# Patient Record
Sex: MALE | Race: OTHER | HISPANIC OR LATINO | Employment: FULL TIME | URBAN - METROPOLITAN AREA
[De-identification: names, ages, dates, MRNs, and addresses within clinical notes are randomized per-mention and may not be internally consistent; named-entity substitution may affect disease eponyms.]

---

## 2021-05-05 ENCOUNTER — TELEPHONE (OUTPATIENT)
Dept: SURGERY | Facility: CLINIC | Age: 41
End: 2021-05-05

## 2021-05-05 NOTE — TELEPHONE ENCOUNTER
PN left VM to PT to call the clinic to link in to care with [facility] AT [facility]

## 2021-05-06 ENCOUNTER — PATIENT OUTREACH (OUTPATIENT)
Dept: SURGERY | Facility: CLINIC | Age: 41
End: 2021-05-06

## 2021-05-06 NOTE — PROGRESS NOTES
CM called ct to schedule intake and make Ct aware of necessary documents that need to be brought with him   CT is scheduled for tomorrow Friday 5/7/2021 at 10am

## 2021-05-07 ENCOUNTER — PATIENT OUTREACH (OUTPATIENT)
Dept: SURGERY | Facility: CLINIC | Age: 41
End: 2021-05-07

## 2021-05-07 NOTE — PROGRESS NOTES
Ct came in for scheduled intake with CM  Ct provided all necessary supporting documents to complete intake process  Ct is not working and does not have a stable income  He does live in stable housing and is not experiencing any domestic violence or abuse  Ct has not been adherent to medications as he has not been on medications for the last 4 years  Ct was receiving services at [facility] for a time but has not returned  Ct did sign an NICHOLE so that CM could receive information from his former   CM completed [employer] application for medicaid and food stamps  CM completed SPBP application but patient navigator had already submitted SPBP  CM reminded Ct that any paperwork he receives in the mail should be brought to CM to go over  CT was given an appt for Monday as well to meet with Janice Shields the nurse to go over things with him like the program and his expectations

## 2021-05-10 ENCOUNTER — PATIENT OUTREACH (OUTPATIENT)
Dept: SURGERY | Facility: CLINIC | Age: 41
End: 2021-05-10

## 2021-05-10 DIAGNOSIS — Z13.1 DIABETES MELLITUS SCREENING: ICD-10-CM

## 2021-05-10 DIAGNOSIS — Z11.3 ROUTINE SCREENING FOR STI (SEXUALLY TRANSMITTED INFECTION): ICD-10-CM

## 2021-05-10 DIAGNOSIS — Z13.6 ENCOUNTER FOR LIPID SCREENING FOR CARDIOVASCULAR DISEASE: ICD-10-CM

## 2021-05-10 DIAGNOSIS — Z13.220 ENCOUNTER FOR LIPID SCREENING FOR CARDIOVASCULAR DISEASE: ICD-10-CM

## 2021-05-10 DIAGNOSIS — B20 HIV DISEASE (HCC): Primary | ICD-10-CM

## 2021-05-10 DIAGNOSIS — Z20.2 CONTACT WITH AND (SUSPECTED) EXPOSURE TO INFECTIONS WITH A PREDOMINANTLY SEXUAL MODE OF TRANSMISSION: ICD-10-CM

## 2021-05-10 NOTE — PROGRESS NOTES
CT met with CM this morning and brought in paperwork from [employer]  CT needs to appeal his claim and gather necessary information that is required  CM suggested to Ct that he contact the phone numbers listed so that he can make an appt with [employer] and go over his case  CM told CT that she is not able to help him apply as she does not have any of his medical information  CT needs to make contact with [employer]  Ct shared that [employer] contacted him about his foodstamps and medical assistance  CT states he will be receiving something in the mail and will bring it to CM as soon as it comes

## 2021-05-11 ENCOUNTER — PATIENT OUTREACH (OUTPATIENT)
Dept: SURGERY | Facility: CLINIC | Age: 41
End: 2021-05-11

## 2021-05-11 NOTE — PROGRESS NOTES
DEVIN and Bindu discussed CT  Bindu stated that his SPBP was approved but then canceled because CT is approved for MA  Ct will have to wait until his MA is active in order to get blood work done and medications

## 2021-05-12 ENCOUNTER — PATIENT OUTREACH (OUTPATIENT)
Dept: SURGERY | Facility: CLINIC | Age: 41
End: 2021-05-12

## 2021-05-12 NOTE — PROGRESS NOTES
CM and nurse Mari Lorenzo discussed Ct  NICHOLE for [address] [place] [address] will be done so that clinical has information about Ct when he was incarcerated  CT has an appt with Mallory Hurst coming up so NICHOLE will be signed then  Mari Lorenzo was told that CT labs will get done when his insurance becomes effective which will probably be the beginning of June  CM is waiting for CT to provide paperwork from [employer] when it comes in

## 2021-05-20 ENCOUNTER — PATIENT OUTREACH (OUTPATIENT)
Dept: SURGERY | Facility: CLINIC | Age: 41
End: 2021-05-20

## 2021-05-20 ENCOUNTER — OFFICE VISIT (OUTPATIENT)
Dept: SURGERY | Facility: CLINIC | Age: 41
End: 2021-05-20
Payer: COMMERCIAL

## 2021-05-20 VITALS
DIASTOLIC BLOOD PRESSURE: 59 MMHG | HEIGHT: 75 IN | OXYGEN SATURATION: 97 % | TEMPERATURE: 96.2 F | RESPIRATION RATE: 18 BRPM | BODY MASS INDEX: 22.41 KG/M2 | HEART RATE: 72 BPM | WEIGHT: 180.2 LBS | SYSTOLIC BLOOD PRESSURE: 118 MMHG

## 2021-05-20 DIAGNOSIS — B20 SYMPTOMATIC HIV INFECTION (HCC): Primary | ICD-10-CM

## 2021-05-20 DIAGNOSIS — F32.A DEPRESSION, UNSPECIFIED DEPRESSION TYPE: ICD-10-CM

## 2021-05-20 DIAGNOSIS — T65.222A: ICD-10-CM

## 2021-05-20 DIAGNOSIS — F79 MENTAL IMPAIRMENT: ICD-10-CM

## 2021-05-20 DIAGNOSIS — R06.2 WHEEZING WITHOUT DIAGNOSIS OF ASTHMA: ICD-10-CM

## 2021-05-20 DIAGNOSIS — F41.9 ANXIETY: ICD-10-CM

## 2021-05-20 DIAGNOSIS — Z00.00 ENCOUNTER FOR MEDICAL EXAMINATION TO ESTABLISH CARE: ICD-10-CM

## 2021-05-20 PROBLEM — T65.221A: Status: ACTIVE | Noted: 2021-05-20

## 2021-05-20 PROCEDURE — 99205 OFFICE O/P NEW HI 60 MIN: CPT | Performed by: NURSE PRACTITIONER

## 2021-05-20 RX ORDER — NICOTINE 21 MG/24HR
1 PATCH, TRANSDERMAL 24 HOURS TRANSDERMAL EVERY 24 HOURS
Qty: 28 PATCH | Refills: 0 | Status: SHIPPED | OUTPATIENT
Start: 2021-05-20 | End: 2021-07-20

## 2021-05-20 RX ORDER — ALBUTEROL SULFATE 90 UG/1
2 AEROSOL, METERED RESPIRATORY (INHALATION) EVERY 6 HOURS PRN
Qty: 18 G | Refills: 2 | Status: SHIPPED | OUTPATIENT
Start: 2021-05-20 | End: 2021-12-09 | Stop reason: SDUPTHER

## 2021-05-20 NOTE — ASSESSMENT & PLAN NOTE
Pt reports he started smoking at the age of 26 with a 2 to 3 ppd hx for 14 years  Pt decreased smoking to about 10 cigarettes per day when he was incarcerated about 6 years ago  Pt also used chew while in penitentiary for unknown period of time  Discussed the increase risk for those with HIV who smoke is 7X higher than those who do not have HIV  Pt wishes to quit smoking and inquired about nicotine patches and gum for smoking cessation    · Stressed the importance of complete smoking cessation  · Educated pt on how to apply nicotine patches to body and how to removed from  body every 24 hours and to chew gum into a thin piece and place in between gum and cheek for no more than 10 minutes when he would smoke a cigarette or have craving for cigarette  · Nicotine patches 14 mg/ 24 hours   · Nicotine gum 4 mg  · Follow with [facility]

## 2021-05-20 NOTE — PROGRESS NOTES
CT came in for scheduled appt with clinic  He brought mail that needed to be looked at  CM enrolled Ct into healthplan with [employer] which will be effective 6/1/2021  CM contacted SPBP and Ct is not active as his medicaid was approved on May 7, 2021  CT will go for labs 6/1/2021  Ct will also get paperwork filled out by last employer so that CM can submit to [employer] without any interruption to his case

## 2021-05-20 NOTE — ASSESSMENT & PLAN NOTE
Doing good  Pt has been non adherent to HAART for at least 4 years  Last labs are from 2019: VL:  40 copies and CD4: 190  Stressed the importance of 100% medication adherence  Pt reports previous use of HAART: 2006: Kaletra, Truvada, Emtriva and 2019: [place] and Tivicay and Biktarvy  · Complete labs once insurance has been approved  · Stressed the importance of taking HAART at the same time every day and setting alarm on phone  · Complete 4 weeks after starting HAART and follow up with ID in 6 weeks  · Labs must be completed first prior to starting on HAART  · Plan to start pt on Biktarvy  · Awaiting rest of his health records  · OI prophylaxis dependent up CD4 count  · Stressed the importance of calling the [address] office if not feeling well  · Discussed U=U  · Pt is not sexually active at this time  · Discussed the importance of not taking any OTC vitamin supplements within 6 hours of HAART  · Discussed OI risks after starting HAART  · No HARRT was prescribed at this visit until pt has insurance and obtains labs on 2021    HIV Counselin2019 labs from [place] by Dr Cat Jones    Viral Load: < 40 copies     CD4 Count: 190    ART: [id number]     Denies side effects  Stressed the importance of adherence  Continue follow up in the ID clinic with Dr Khushbu Bonilla  Reviewed the most recent labs, including the Cd4 and viral load  Discussed the risks and benefits of treatment options, instructions for management, importance of treatment adherence, and reduction of risk factors  Educated on possible medication side effects  Counseled on routes of HIV transmission, including the risk of  infection  Emphasized that viral suppression is the best method to prevent HIV transmission  At this time the pt denies the need for HIV testing of anyone in their life  Total encounter time was greater than 35 minutes  Greater than 20 minutes were spent on counseling and patient education    Pt voices understanding and agreement with treatment plan

## 2021-05-20 NOTE — ASSESSMENT & PLAN NOTE
Ongoing  Pt admits to feeling down and due to life circumstances  Smoking marijuana for depression/anxiety    Denies any SA, or SI    · Follow with [facility]  · Continue to monitor

## 2021-05-20 NOTE — ASSESSMENT & PLAN NOTE
Ongoing  Pt admits to feeling down and due to life circumstances  Smoking marijuana for depression/anxiety  No signs of anxiety during visit     · Follow with [facility]  · Continue to monitor

## 2021-05-20 NOTE — PROGRESS NOTES
Assessment/Plan:    Symptomatic HIV infection ([address] )  Doing good  Pt has been non adherent to HAART for at least 4 years  Last labs are from 2019: VL:  40 copies and CD4: 190  Stressed the importance of 100% medication adherence  Pt reports previous use of HAART: 2006: Kaletra, Truvada, Emtriva and 2019: Barnetta Distad and Tivicay and Biktarvy  · Complete labs once insurance has been approved  · Stressed the importance of taking HAART at the same time every day and setting alarm on phone  · Complete 4 weeks after starting HAART and follow up with ID in 6 weeks  · Labs must be completed first prior to starting on HAART  · Plan to start pt on Biktarvy  · Awaiting rest of his health records  · OI prophylaxis dependent up CD4 count  · Stressed the importance of calling the [address] [place] [address] office if not feeling well  · Discussed U=U  · Pt is not sexually active at this time  · Discussed the importance of not taking any OTC vitamin supplements within 6 hours of HAART  · Discussed OI risks after starting HAART  · No HARRT was prescribed at this visit until pt has insurance and obtains labs on 2021    HIV Counselin2019 labs from [facility] by Dr Ashley Kumari    Viral Load: < 40 copies     CD4 Count: 190    ART: [id number]     Denies side effects  Stressed the importance of adherence  Continue follow up in the ID clinic with Dr Yoel Caba  Reviewed the most recent labs, including the Cd4 and viral load  Discussed the risks and benefits of treatment options, instructions for management, importance of treatment adherence, and reduction of risk factors  Educated on possible medication side effects  Counseled on routes of HIV transmission, including the risk of  infection  Emphasized that viral suppression is the best method to prevent HIV transmission  At this time the pt denies the need for HIV testing of anyone in their life  Total encounter time was greater than 35 minutes    Greater than 20 minutes were spent on counseling and patient education  Pt voices understanding and agreement with treatment plan  Toxic effect of secondhand tobacco smoke  Pt reports he started smoking at the age of 26 with a 2 to 3 ppd hx for 14 years  Pt decreased smoking to about 10 cigarettes per day when he was incarcerated about 6 years ago  Pt also used chew while in [place]way for unknown period of time  Discussed the increase risk for those with HIV who smoke is 7X higher than those who do not have HIV  Pt wishes to quit smoking and inquired about nicotine patches and gum for smoking cessation  · Stressed the importance of complete smoking cessation  · Educated pt on how to apply nicotine patches to body and how to removed from  body every 24 hours and to chew gum into a thin piece and place in between gum and cheek for no more than 10 minutes when he would smoke a cigarette or have craving for cigarette  · Nicotine patches 14 mg/ 24 hours   · Nicotine gum 4 mg  · Follow with [facility]    Wheezing without diagnosis of asthma  New dx  Pt denies any sob except occasionally when he is exercising  Pt denies pulmonary history: asthma, or COPD, or any infections  No fever, night sweats, chills, or cough  Inspiratory wheezing anterior/posterior  Current smoker  Concern for OI when pt starts HAART  DDx: asthma, allergies, OI, TB, or CAP    · CXR when pt has insurance June 1, 2021  · Albuterol inhaler 2 puffs every 6 hours as need for wheezing            Depression  Ongoing  Pt admits to feeling down and due to life circumstances  Smoking marijuana for depression/anxiety  Denies any SA, or SI    · Follow with [facility]  · Continue to monitor     Anxiety  Ongoing  Pt admits to feeling down and due to life circumstances  Smoking marijuana for depression/anxiety  No signs of anxiety during visit  · Follow with [facility]  · Continue to monitor     Mental impairment  Noted in his medical history    No obvious signs of mental impairment at today's visit  Previously seen by VIJAY LINDSAY and CM  · Continue to monitor       Diagnoses and all orders for this visit:    Symptomatic HIV infection ([facility] )  -     XR chest pa & lateral; Future    Encounter for medical examination to establish care    Mental impairment    Toxic effect of secondhand tobacco smoke, intentional self-harm, initial encounter ([place] [address] )  -     nicotine (NICODERM CQ) 14 mg/24hr TD 24 hr patch; Place 1 patch on the skin every 24 hours  -     nicotine polacrilex (NICORETTE) 4 mg gum; Chew 1 each (4 mg total) as needed for smoking cessation    Depression, unspecified depression type    Anxiety    Wheezing without diagnosis of asthma  -     XR chest pa & lateral; Future      No results found for: NA, K, CL, CO2, ANIONGAP, BUN, CREATININE, GLUCOSE, GLUF, CALCIUM, CORRECTEDCA, AST, ALT, ALKPHOS, PROT, BILITOT, EGFR  No results found for: WBC, HGB, HCT, MCV, PLT       Patient Active Problem List   Diagnosis    Symptomatic HIV infection ([place] [address] )    Toxic effect of secondhand tobacco smoke    Wheezing without diagnosis of asthma    Depression    Anxiety    Mental impairment     Subjective:      Patient ID: Renuka Benavides is a 45 y o  male  HPI  Pt is being seen in the office today to establish care with PCP and for management of chronic health care conditions  PMH: HIV, mental retardation, and vitamin D deficiency  Pt was dx with HIV in 1990's through sexual contact  Confirmatory February 8, 2006; most current in our records  Pt believes his transmission was received through a dirty needle that he stepped on from a known pt that was HIV positive  Pt was approved for [place] but will not be effective until June 1, 2021  Pt has been receiving services at [facility] at one time but has not been on any HAART for the past 4 years  Pt was released from prison 4 years ago after being incarcerated for several years    Pt was insistant that last known [facility] intake was 4 years even though there are records from [address] indicating pt was last seen for labs and noted to be taking Descovy and Tivicay from 2/2019 with a VL < 40 copies  Pt does not know what HAART he was taking and it thought to be taking Biktarvy and Descovy and Tivicay while incarcerated but records from [address] show [address] from 2/2019  Pt started on Kaletra in 2006, Truvada and Emtriva at some time, and transitioned to [address] with [facility] 2019  Pt is not working and is in the process of appealing SSI denial but reports having stable housing  Pt reports he is having loose stool after he eats due to being off of his HIV treatment  Pt admits to feeling depressed and anxious at time due to HIV status and not being treated  Pt denies wanting to injury himself and was taking medication for depression many years ago but does not know what  Pt denies being sexually active  Pt is looking for a job  Pt does smoke about 10 cigarettes per day but did start smoking at the age of 26 and smoked 2 to 3 packs per day for about 14 years  Pt denies ETOH and does smoke marijuana daily for depression and anxiety  Pt denies any fevers, chills, night sweats, nausea, vomiting, or diarrhea  Pt does have some sob when he exercises but not always  Pt is Dominican speaking only and interpretor was present during the entire visit  I have spent 60 minutes with Patient  today in which greater than 50% of this time was spent in counseling/coordination of care regarding Prognosis, Risks and benefits of tx options, Intructions for management, Patient and family education, Importance of tx compliance, Risk factor reductions and Impressions        The following portions of the patient's history were reviewed and updated as appropriate: allergies, current medications, past family history, past medical history, past social history, past surgical history and problem list     Review of Systems Constitutional: Negative  HENT: Negative  Respiratory: Negative  Cardiovascular: Negative  Gastrointestinal: Negative  Genitourinary: Negative  Musculoskeletal: Negative  Neurological: Negative  Psychiatric/Behavioral: Negative  Objective:      /59 (BP Location: Right arm, Patient Position: Sitting, Cuff Size: Standard)   Pulse 72   Temp (!) 96 2 °F (35 7 °C)   Resp 18   Ht 6' 3" (1 905 m)   Wt 81 7 kg (180 lb 3 2 oz)   SpO2 97%   BMI 22 52 kg/m²          Physical Exam  Vitals signs and nursing note reviewed  Exam conducted with a chaperone present  Constitutional:       General: He is not in acute distress  Appearance: Normal appearance  He is obese  He is not ill-appearing  HENT:      Head: Normocephalic  Right Ear: Tympanic membrane normal       Left Ear: Tympanic membrane normal       Nose: Nose normal       Mouth/Throat:      Mouth: Mucous membranes are moist    Eyes:      Extraocular Movements: Extraocular movements intact  Pupils: Pupils are equal, round, and reactive to light  Cardiovascular:      Rate and Rhythm: Normal rate and regular rhythm  Chest Wall: PMI is not displaced  Pulses: Normal pulses  Heart sounds: Normal heart sounds  Pulmonary:      Effort: Pulmonary effort is normal  No accessory muscle usage  Breath sounds: Examination of the right-upper field reveals wheezing  Examination of the left-upper field reveals wheezing  Examination of the right-middle field reveals wheezing  Examination of the left-middle field reveals wheezing  Examination of the right-lower field reveals wheezing  Examination of the left-lower field reveals wheezing  Wheezing present  Comments: Inspiratory wheezes anterior and posterior  Abdominal:      General: Bowel sounds are normal       Palpations: Abdomen is soft  Musculoskeletal: Normal range of motion  Lymphadenopathy:      Cervical: No cervical adenopathy     Skin: General: Skin is warm and dry  Capillary Refill: Capillary refill takes less than 2 seconds  Neurological:      Mental Status: He is alert and oriented to person, place, and time  Psychiatric:         Mood and Affect: Mood normal          Behavior: Behavior normal          Thought Content:  Thought content normal          Judgment: Judgment normal

## 2021-05-20 NOTE — ASSESSMENT & PLAN NOTE
New dx  Pt denies any sob except occasionally when he is exercising  Pt denies pulmonary history: asthma, or COPD, or any infections  No fever, night sweats, chills, or cough  Inspiratory wheezing anterior/posterior  Current smoker  Concern for OI when pt starts HAART    DDx: asthma, allergies, OI, TB, or CAP    · CXR when pt has insurance June 1, 2021  · Albuterol inhaler 2 puffs every 6 hours as need for wheezing

## 2021-05-20 NOTE — ASSESSMENT & PLAN NOTE
Noted in his medical history  No obvious signs of mental impairment at today's visit  Previously seen by VIJAY LINDSAY and DEVIN      · Continue to monitor

## 2021-06-04 ENCOUNTER — PATIENT OUTREACH (OUTPATIENT)
Dept: SURGERY | Facility: CLINIC | Age: 41
End: 2021-06-04

## 2021-06-07 ENCOUNTER — TELEPHONE (OUTPATIENT)
Dept: SURGERY | Facility: CLINIC | Age: 41
End: 2021-06-07

## 2021-06-07 NOTE — TELEPHONE ENCOUNTER
Patient left message 06/07 @ 7:51AM requesting to know when next appointment was scheduled  Spoke with patient and reminded of 06/21 appointment with PCP  Patient expressed understanding

## 2021-06-08 ENCOUNTER — PATIENT OUTREACH (OUTPATIENT)
Dept: SURGERY | Facility: CLINIC | Age: 41
End: 2021-06-08

## 2021-06-08 NOTE — PROGRESS NOTES
CM received in basket from [place] [id number] stating that ct has appt with Iggy on June 21, 2021 and has not done labs  CM called and left message for CT to return call

## 2021-06-09 ENCOUNTER — TELEPHONE (OUTPATIENT)
Dept: SURGERY | Facility: CLINIC | Age: 41
End: 2021-06-09

## 2021-06-09 ENCOUNTER — PATIENT OUTREACH (OUTPATIENT)
Dept: SURGERY | Facility: CLINIC | Age: 41
End: 2021-06-09

## 2021-06-09 NOTE — TELEPHONE ENCOUNTER
Patient left message 06/09 @ 7:50AM          Returned phone call and spoke with patient regarding PCP appointment on 06/21 and completing lab work prior to appointment   Patient expressed understanding and requested to have lab work printed and he will  today before 3PM

## 2021-06-10 ENCOUNTER — PATIENT OUTREACH (OUTPATIENT)
Dept: SURGERY | Facility: CLINIC | Age: 41
End: 2021-06-10

## 2021-06-10 NOTE — PROGRESS NOTES
[place] and CM discussed Ct  Shahida Bernard shared that Ct appt was changed and Ct is aware  Ct was also reminded to complete lab work

## 2021-06-14 ENCOUNTER — PATIENT OUTREACH (OUTPATIENT)
Dept: SURGERY | Facility: CLINIC | Age: 41
End: 2021-06-14

## 2021-06-14 NOTE — PROGRESS NOTES
Ct called in worried about getting his bloodwork done because he admitted in intake that he smokes marijuana  CM explained to Ct that clinic is looking for certain things in the bloodwork and marijuana is not one of them  CM encouraged ct to have labs done tomorrow in time for next weeks appt

## 2021-06-15 ENCOUNTER — APPOINTMENT (OUTPATIENT)
Dept: LAB | Facility: CLINIC | Age: 41
End: 2021-06-15
Payer: COMMERCIAL

## 2021-06-15 ENCOUNTER — PATIENT OUTREACH (OUTPATIENT)
Dept: SURGERY | Facility: CLINIC | Age: 41
End: 2021-06-15

## 2021-06-15 ENCOUNTER — TELEPHONE (OUTPATIENT)
Dept: SURGERY | Facility: CLINIC | Age: 41
End: 2021-06-15

## 2021-06-15 DIAGNOSIS — Z20.2 CONTACT WITH AND (SUSPECTED) EXPOSURE TO INFECTIONS WITH A PREDOMINANTLY SEXUAL MODE OF TRANSMISSION: ICD-10-CM

## 2021-06-15 DIAGNOSIS — Z13.1 DIABETES MELLITUS SCREENING: ICD-10-CM

## 2021-06-15 DIAGNOSIS — Z13.6 ENCOUNTER FOR LIPID SCREENING FOR CARDIOVASCULAR DISEASE: ICD-10-CM

## 2021-06-15 DIAGNOSIS — B20 HIV DISEASE (HCC): ICD-10-CM

## 2021-06-15 DIAGNOSIS — Z11.3 ROUTINE SCREENING FOR STI (SEXUALLY TRANSMITTED INFECTION): ICD-10-CM

## 2021-06-15 DIAGNOSIS — Z13.220 ENCOUNTER FOR LIPID SCREENING FOR CARDIOVASCULAR DISEASE: ICD-10-CM

## 2021-06-15 LAB
ALBUMIN SERPL BCP-MCNC: 3.5 G/DL (ref 3.5–5)
ALP SERPL-CCNC: 122 U/L (ref 46–116)
ALT SERPL W P-5'-P-CCNC: 20 U/L (ref 12–78)
ANION GAP SERPL CALCULATED.3IONS-SCNC: 2 MMOL/L (ref 4–13)
AST SERPL W P-5'-P-CCNC: 22 U/L (ref 5–45)
BACTERIA UR QL AUTO: NORMAL /HPF
BASOPHILS # BLD MANUAL: 0 THOUSAND/UL (ref 0–0.1)
BASOPHILS NFR MAR MANUAL: 0 % (ref 0–1)
BILIRUB SERPL-MCNC: 0.41 MG/DL (ref 0.2–1)
BILIRUB UR QL STRIP: NEGATIVE
BUN SERPL-MCNC: 11 MG/DL (ref 5–25)
CALCIUM SERPL-MCNC: 9.2 MG/DL (ref 8.3–10.1)
CHLORIDE SERPL-SCNC: 108 MMOL/L (ref 100–108)
CHOLEST SERPL-MCNC: 132 MG/DL (ref 50–200)
CLARITY UR: CLEAR
CO2 SERPL-SCNC: 28 MMOL/L (ref 21–32)
COLOR UR: YELLOW
CREAT SERPL-MCNC: 0.88 MG/DL (ref 0.6–1.3)
EOSINOPHIL # BLD MANUAL: 0 THOUSAND/UL (ref 0–0.4)
EOSINOPHIL NFR BLD MANUAL: 0 % (ref 0–6)
ERYTHROCYTE [DISTWIDTH] IN BLOOD BY AUTOMATED COUNT: 13 % (ref 11.6–15.1)
EST. AVERAGE GLUCOSE BLD GHB EST-MCNC: 123 MG/DL
GFR SERPL CREATININE-BSD FRML MDRD: 108 ML/MIN/1.73SQ M
GLUCOSE P FAST SERPL-MCNC: 85 MG/DL (ref 65–99)
GLUCOSE UR STRIP-MCNC: NEGATIVE MG/DL
HBA1C MFR BLD: 5.9 %
HBV SURFACE AB SER-ACNC: 68.15 MIU/ML
HBV SURFACE AG SER QL: NORMAL
HCT VFR BLD AUTO: 37.6 % (ref 36.5–49.3)
HCV AB SER QL: NORMAL
HDLC SERPL-MCNC: 40 MG/DL
HGB BLD-MCNC: 12.6 G/DL (ref 12–17)
HGB UR QL STRIP.AUTO: NEGATIVE
KETONES UR STRIP-MCNC: NEGATIVE MG/DL
LDLC SERPL CALC-MCNC: 73 MG/DL (ref 0–100)
LEUKOCYTE ESTERASE UR QL STRIP: NEGATIVE
LYMPHOCYTES # BLD AUTO: 1.14 THOUSAND/UL (ref 0.6–4.47)
LYMPHOCYTES # BLD AUTO: 32 % (ref 14–44)
MCH RBC QN AUTO: 30.4 PG (ref 26.8–34.3)
MCHC RBC AUTO-ENTMCNC: 33.5 G/DL (ref 31.4–37.4)
MCV RBC AUTO: 91 FL (ref 82–98)
MONOCYTES # BLD AUTO: 0.32 THOUSAND/UL (ref 0–1.22)
MONOCYTES NFR BLD: 9 % (ref 4–12)
NEUTROPHILS # BLD MANUAL: 2.1 THOUSAND/UL (ref 1.85–7.62)
NEUTS BAND NFR BLD MANUAL: 5 % (ref 0–8)
NEUTS SEG NFR BLD AUTO: 54 % (ref 43–75)
NITRITE UR QL STRIP: NEGATIVE
NON-SQ EPI CELLS URNS QL MICRO: NORMAL /HPF
NRBC BLD AUTO-RTO: 0 /100 WBCS
PH UR STRIP.AUTO: 6.5 [PH]
PLATELET # BLD AUTO: 108 THOUSANDS/UL (ref 149–390)
PLATELET BLD QL SMEAR: ABNORMAL
PMV BLD AUTO: 12.4 FL (ref 8.9–12.7)
POTASSIUM SERPL-SCNC: 4.2 MMOL/L (ref 3.5–5.3)
PROT SERPL-MCNC: 9.4 G/DL (ref 6.4–8.2)
PROT UR STRIP-MCNC: ABNORMAL MG/DL
RBC # BLD AUTO: 4.14 MILLION/UL (ref 3.88–5.62)
RBC #/AREA URNS AUTO: NORMAL /HPF
RBC MORPH BLD: NORMAL
SODIUM SERPL-SCNC: 138 MMOL/L (ref 136–145)
SP GR UR STRIP.AUTO: 1.02 (ref 1–1.03)
TOTAL CELLS COUNTED SPEC: 100
TRIGL SERPL-MCNC: 94 MG/DL
UROBILINOGEN UR QL STRIP.AUTO: 1 E.U./DL
WBC # BLD AUTO: 3.56 THOUSAND/UL (ref 4.31–10.16)
WBC #/AREA URNS AUTO: NORMAL /HPF

## 2021-06-15 PROCEDURE — 86780 TREPONEMA PALLIDUM: CPT

## 2021-06-15 PROCEDURE — 87340 HEPATITIS B SURFACE AG IA: CPT

## 2021-06-15 PROCEDURE — 80061 LIPID PANEL: CPT

## 2021-06-15 PROCEDURE — 87900 PHENOTYPE INFECT AGENT DRUG: CPT

## 2021-06-15 PROCEDURE — 86777 TOXOPLASMA ANTIBODY: CPT

## 2021-06-15 PROCEDURE — 86701 HIV-1ANTIBODY: CPT

## 2021-06-15 PROCEDURE — 87536 HIV-1 QUANT&REVRSE TRNSCRPJ: CPT

## 2021-06-15 PROCEDURE — 87591 N.GONORRHOEAE DNA AMP PROB: CPT

## 2021-06-15 PROCEDURE — 81381 HLA I TYPING 1 ALLELE HR: CPT

## 2021-06-15 PROCEDURE — 85007 BL SMEAR W/DIFF WBC COUNT: CPT

## 2021-06-15 PROCEDURE — 81001 URINALYSIS AUTO W/SCOPE: CPT

## 2021-06-15 PROCEDURE — 86593 SYPHILIS TEST NON-TREP QUANT: CPT

## 2021-06-15 PROCEDURE — 86803 HEPATITIS C AB TEST: CPT

## 2021-06-15 PROCEDURE — 86645 CMV ANTIBODY IGM: CPT

## 2021-06-15 PROCEDURE — 80053 COMPREHEN METABOLIC PANEL: CPT

## 2021-06-15 PROCEDURE — 86480 TB TEST CELL IMMUN MEASURE: CPT

## 2021-06-15 PROCEDURE — 86644 CMV ANTIBODY: CPT

## 2021-06-15 PROCEDURE — 87901 NFCT AGT GNTYP ALYS HIV1 REV: CPT

## 2021-06-15 PROCEDURE — 85027 COMPLETE CBC AUTOMATED: CPT

## 2021-06-15 PROCEDURE — 86706 HEP B SURFACE ANTIBODY: CPT

## 2021-06-15 PROCEDURE — 87906 NFCT AGT GNTYP ALYS HIV1: CPT

## 2021-06-15 PROCEDURE — 36415 COLL VENOUS BLD VENIPUNCTURE: CPT

## 2021-06-15 PROCEDURE — 86592 SYPHILIS TEST NON-TREP QUAL: CPT

## 2021-06-15 PROCEDURE — 86702 HIV-2 ANTIBODY: CPT

## 2021-06-15 PROCEDURE — 86708 HEPATITIS A ANTIBODY: CPT

## 2021-06-15 PROCEDURE — 83036 HEMOGLOBIN GLYCOSYLATED A1C: CPT

## 2021-06-15 PROCEDURE — 86361 T CELL ABSOLUTE COUNT: CPT

## 2021-06-15 PROCEDURE — 87491 CHLMYD TRACH DNA AMP PROBE: CPT

## 2021-06-15 PROCEDURE — 86778 TOXOPLASMA ANTIBODY IGM: CPT

## 2021-06-15 NOTE — PROGRESS NOTES
Ct will be seen by Haily Pimentel 6/24/2021 at 9:30am  Lawrence Nicanor will need to be done as CT shared that he was feeling depressed and would like help for it   Ct does not have COVID vaccine on file

## 2021-06-16 LAB
BASOPHILS # BLD AUTO: 0 X10E3/UL (ref 0–0.2)
BASOPHILS NFR BLD AUTO: 0 %
CD3+CD4+ CELLS # BLD: 21 /UL (ref 359–1519)
CD3+CD4+ CELLS NFR BLD: 1.9 % (ref 30.8–58.5)
CLINICAL COMMENT: ABNORMAL
CMV IGG SERPL IA-ACNC: <0.6 U/ML (ref 0–0.59)
CMV IGM SERPL IA-ACNC: <30 AU/ML (ref 0–29.9)
EOSINOPHIL # BLD AUTO: 0.1 X10E3/UL (ref 0–0.4)
EOSINOPHIL NFR BLD AUTO: 3 %
ERYTHROCYTE [DISTWIDTH] IN BLOOD BY AUTOMATED COUNT: 13.2 % (ref 11.6–15.4)
HAV AB SER QL IA: REACTIVE
HCT VFR BLD AUTO: 37.9 % (ref 37.5–51)
HGB BLD-MCNC: 13 G/DL (ref 13–17.7)
HIV 1+2 AB+HIV1 P24 AG SERPL QL IA: REACTIVE
HIV1 RNA # SERPL NAA+PROBE: NORMAL COPIES/ML
HIV1 RNA SERPL NAA+PROBE-LOG#: 5.67 LOG10COPY/ML
IMM GRANULOCYTES # BLD: 0 X10E3/UL (ref 0–0.1)
IMM GRANULOCYTES NFR BLD: 0 %
LYMPHOCYTES # BLD AUTO: 1.1 X10E3/UL (ref 0.7–3.1)
LYMPHOCYTES NFR BLD AUTO: 31 %
MCH RBC QN AUTO: 30.8 PG (ref 26.6–33)
MCHC RBC AUTO-ENTMCNC: 34.3 G/DL (ref 31.5–35.7)
MCV RBC AUTO: 90 FL (ref 79–97)
MONOCYTES # BLD AUTO: 0.3 X10E3/UL (ref 0.1–0.9)
MONOCYTES NFR BLD AUTO: 10 %
MORPHOLOGY BLD-IMP: ABNORMAL
NEUTROPHILS # BLD AUTO: 1.9 X10E3/UL (ref 1.4–7)
NEUTROPHILS NFR BLD AUTO: 56 %
PLATELET # BLD AUTO: 108 X10E3/UL (ref 150–450)
RBC # BLD AUTO: 4.22 X10E6/UL (ref 4.14–5.8)
RPR SER QL: REACTIVE
RPR SER-TITR: ABNORMAL {TITER}
T GONDII IGG SERPL IA-ACNC: >400 IU/ML (ref 0–7.1)
T GONDII IGM SER IA-ACNC: <3 AU/ML (ref 0–7.9)
WBC # BLD AUTO: 3.4 X10E3/UL (ref 3.4–10.8)

## 2021-06-17 LAB
C TRACH DNA SPEC QL NAA+PROBE: NEGATIVE
HIV 2 AB SERPL QL IA: NEGATIVE
HIV1 AB SERPL QL IA: POSITIVE
N GONORRHOEA DNA SPEC QL NAA+PROBE: NEGATIVE
SL AMB NOTE:: ABNORMAL
T PALLIDUM AB SER QL IF: REACTIVE

## 2021-06-18 LAB
GAMMA INTERFERON BACKGROUND BLD IA-ACNC: 0.08 IU/ML
M TB IFN-G BLD-IMP: NEGATIVE
M TB IFN-G CD4+ BCKGRND COR BLD-ACNC: 0 IU/ML
M TB IFN-G CD4+ BCKGRND COR BLD-ACNC: 0.01 IU/ML
MITOGEN IGNF BCKGRD COR BLD-ACNC: >10 IU/ML

## 2021-06-21 LAB — HLA-B*57:01 SPEC QL: NEGATIVE

## 2021-06-24 ENCOUNTER — PATIENT OUTREACH (OUTPATIENT)
Dept: SURGERY | Facility: CLINIC | Age: 41
End: 2021-06-24

## 2021-06-24 ENCOUNTER — OFFICE VISIT (OUTPATIENT)
Dept: SURGERY | Facility: CLINIC | Age: 41
End: 2021-06-24
Payer: COMMERCIAL

## 2021-06-24 VITALS
TEMPERATURE: 96.6 F | RESPIRATION RATE: 16 BRPM | HEART RATE: 78 BPM | WEIGHT: 173.8 LBS | SYSTOLIC BLOOD PRESSURE: 115 MMHG | OXYGEN SATURATION: 98 % | DIASTOLIC BLOOD PRESSURE: 59 MMHG | BODY MASS INDEX: 21.61 KG/M2 | HEIGHT: 75 IN

## 2021-06-24 DIAGNOSIS — B37.0 THRUSH, ORAL: ICD-10-CM

## 2021-06-24 DIAGNOSIS — Z71.85 VACCINE COUNSELING: ICD-10-CM

## 2021-06-24 DIAGNOSIS — A53.0 POSITIVE RPR TEST: ICD-10-CM

## 2021-06-24 DIAGNOSIS — B20 AIDS (ACQUIRED IMMUNE DEFICIENCY SYNDROME) (HCC): ICD-10-CM

## 2021-06-24 DIAGNOSIS — R63.4 WEIGHT LOSS: ICD-10-CM

## 2021-06-24 DIAGNOSIS — T65.222A: Primary | ICD-10-CM

## 2021-06-24 DIAGNOSIS — B20 SYMPTOMATIC HIV INFECTION (HCC): ICD-10-CM

## 2021-06-24 DIAGNOSIS — Z29.8 NEED FOR PNEUMOCYSTIS PROPHYLAXIS: ICD-10-CM

## 2021-06-24 PROBLEM — Z29.89 NEED FOR PNEUMOCYSTIS PROPHYLAXIS: Status: ACTIVE | Noted: 2021-06-24

## 2021-06-24 PROCEDURE — 99215 OFFICE O/P EST HI 40 MIN: CPT | Performed by: NURSE PRACTITIONER

## 2021-06-24 RX ORDER — FLUCONAZOLE 100 MG/1
100 TABLET ORAL DAILY
Qty: 14 TABLET | Refills: 0 | Status: SHIPPED | OUTPATIENT
Start: 2021-06-24 | End: 2021-07-08

## 2021-06-24 RX ORDER — SULFAMETHOXAZOLE AND TRIMETHOPRIM 800; 160 MG/1; MG/1
1 TABLET ORAL EVERY 12 HOURS SCHEDULED
Qty: 60 TABLET | Refills: 5 | Status: SHIPPED | OUTPATIENT
Start: 2021-06-24 | End: 2021-07-20 | Stop reason: DRUGHIGH

## 2021-06-24 NOTE — PATIENT INSTRUCTIONS
Start taking Descovy and Tivicay daily at the same time  Start taking Bactrim DS 1 tablet daily by mouth may take with Descovy and Tivicay  Start Fluconazole 100 mg tablet daily by mouth x 14 days  If you start taking Ensure you must not take with Descovy and Tivicay and separate out by at least 6 hours  Follow up with [facility] for treatment of RPR  Labs for CD4 and VL in 4 weeks after starting Descovy and Tivicay

## 2021-06-24 NOTE — ASSESSMENT & PLAN NOTE
New dx  Weight loss: 7 lb over 1 month  Supplement AIDS related weight loss    BMI: 21 72    · Follow with [facility] to start Ensure supplements

## 2021-06-24 NOTE — ASSESSMENT & PLAN NOTE
New dx  Pt reports having white spots in roof of mouth and had enlarged lymph nodes at one point  Pt denies pain during swallowing  On exam several clusters of white exudate on roof of mouth, tongue, and side pockets of cheeks       · Start fluconazole 100 mg PO daily X 14 days  · Call office if you develop worsening symptoms or pain with eating or swallowing

## 2021-06-24 NOTE — ASSESSMENT & PLAN NOTE
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

## 2021-06-24 NOTE — ASSESSMENT & PLAN NOTE
Due to low CD4 count < 200 pt will require OI daily prophylaxis  No allergies to medication    · Start Bactrim DS 1 table daily PO  · Check CD4

## 2021-06-24 NOTE — ASSESSMENT & PLAN NOTE
Pt is smoking about 2 cigarettes per day due to stress  · Stressed the importance of complete smoking cessation  Nicotine Dependency - Primary    Counseled for greater than 15 minutes on the importance of smoking cessation  Education was given regarding the cardiovascular effects of how nicotine affects the heart, lungs, kidneys,and peripheral vascular system    Referred to [address] for enrollment in smoking cessation program

## 2021-06-24 NOTE — ASSESSMENT & PLAN NOTE
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  CMV: negative  Toxoplasmosis IgG:  > 400  No treatment at this time  Pt denies any seizure or daily HA  Pt does have an occasional HA but laying down seems to resolve HA  Continue to monitor  · Stressed the importance of 100% medication adherence  · Stressed the importance of completing labs in 4 weeks after initiation of HAART and follow up with ID in 6 weeks  · Stressed importance of not taking and protein supplement within 6   · Will add fluconazole 100 mg QD X 14 days for candida thrush  · Will add Bactrim DS 1 table daily for OI prophylaxis  · Instructed pt to call office with any questions or if he is not feeling well since pt is at risk for developing OI's or IRIS  · Follow up 3 weeks with me  · Hold off on Covid Vaccine for now until VL is undetectable  Pt verbalized understanding  HIV Counseling:    Viral Load: 470,000 copies     CD4 Count: 21    ART: Tivicay & Descovy will start 21    Denies side effects  Stressed the importance of adherence  Continue follow up in the ID clinic with Dr Jonah Huerta  Reviewed the most recent labs, including the Cd4 and viral load  Discussed the risks and benefits of treatment options, instructions for management, importance of treatment adherence, and reduction of risk factors  Educated on possible medication side effects  Counseled on routes of HIV transmission, including the risk of  infection  Emphasized that viral suppression is the best method to prevent HIV transmission  At this time the pt denies the need for HIV testing of anyone in their life  Total encounter time was greater than 35 minutes  Greater than 20 minutes were spent on counseling and patient education    Pt voices understanding and agreement with treatment plan

## 2021-06-24 NOTE — PROGRESS NOTES
CM called and left message for Ct reminding him of his appt today at 9:30am with Ladarius Gunn PCP    CM discussed with clinic Ct status and what he needs to get done within the next few weeks  Clinic made appt for [facility]  CM spoke with CT after his offcie visit  Ct states that he is trying his best to remain positive and focused on doing what needs to be done in order to get himself healthier  CM asked if he will disclose to mother about his recent positive screening for syphilis  Ct states that he will share with her and make sure that he gets to the appt because he wants to get better  He has lost weight because he does not have roberta ppetite  Ct says that once he is stable on his medications he hopes to have more energy  CT says that he is moving forward and will do what is necessary to get to a better place physically and mentally

## 2021-06-24 NOTE — PROGRESS NOTES
Assessment/Plan:    Symptomatic HIV infection ([address] )  Feeling ok  Pt has not been taking HAART for a long time  Recent 7 lb weight loss  Pt does have AIDS with CD4 count of 21  Pt is poor historian  Discussed with Dr Zayra Marquis  RPR positive 1:4 dils and Positive FTA  CMV: negative  Toxoplasmosis IgG:  > 400  No treatment at this time  Pt denies any seizure or daily HA  Pt does have an occasional HA but laying down seems to resolve HA  Continue to monitor  · Stressed the importance of 100% medication adherence  · Stressed the importance of completing labs in 4 weeks after initiation of HAART and follow up with ID in 6 weeks  · Stressed importance of not taking and protein supplement within 6   · Will add fluconazole 100 mg QD X 14 days for candida thrush  · Will add Bactrim DS 1 table daily for OI prophylaxis  · Instructed pt to call office with any questions or if he is not feeling well since pt is at risk for developing OI's or IRIS  · Follow up 3 weeks with me  · Hold off on Covid Vaccine for now until VL is undetectable  Pt verbalized understanding  HIV Counseling:    Viral Load: 470,000 copies     CD4 Count: 21    ART: Tivicay & Descovy will start 21    Denies side effects  Stressed the importance of adherence  Continue follow up in the ID clinic with Dr Zayra Marquis  Reviewed the most recent labs, including the Cd4 and viral load  Discussed the risks and benefits of treatment options, instructions for management, importance of treatment adherence, and reduction of risk factors  Educated on possible medication side effects  Counseled on routes of HIV transmission, including the risk of  infection  Emphasized that viral suppression is the best method to prevent HIV transmission  At this time the pt denies the need for HIV testing of anyone in their life  Total encounter time was greater than 35 minutes  Greater than 20 minutes were spent on counseling and patient education    Pt voices understanding and agreement with treatment plan  Positive RPR test  RPR 1: 4 dils with positive FTA's  Pt is poor historian  Since pt does not know last RPR testing or any recent results in the past year and has been off of his HAART for 4 years he will require treatment with Benzathine 2 4 million units IM X 3 doses with repeat RPR in 3 months  · [facility] MA will help pt get set up for R[facility] treatment [facility]  · No sex until RPR treatment has been completed  ·  Benzathine 2 4 million units IM X 3 doses with repeat RPR in 3 months  Need for pneumocystis prophylaxis  Due to low CD4 count < 200 pt will require OI daily prophylaxis  No allergies to medication  · Start Bactrim DS 1 table daily PO  · Check CD4     Toxic effect of secondhand tobacco smoke    Pt is smoking about 2 cigarettes per day due to stress  · Stressed the importance of complete smoking cessation  Nicotine Dependency - Primary    Counseled for greater than 15 minutes on the importance of smoking cessation  Education was given regarding the cardiovascular effects of how nicotine affects the heart, lungs, kidneys,and peripheral vascular system  Referred to [facility] for enrollment in smoking cessation program       sarah Anthony  New dx  Pt reports having white spots in roof of mouth and had enlarged lymph nodes at one point  Pt denies pain during swallowing  On exam several clusters of white exudate on roof of mouth, tongue, and side pockets of cheeks  · Start fluconazole 100 mg PO daily X 14 days  · Call office if you develop worsening symptoms or pain with eating or swallowing      Weight loss  New dx  Weight loss: 7 lb over 1 month  Supplement AIDS related weight loss    BMI: 21 72    · Follow with [facility] dietician to start Ensure supplements    Patient Active Problem List   Diagnosis    Symptomatic HIV infection ([facility] [address] )    Toxic effect of secondhand tobacco smoke    Wheezing without diagnosis of asthma  Depression    Anxiety    Mental impairment    Positive RPR test    Need for pneumocystis prophylaxis    Thrush, oral    Weight loss     Lab Results   Component Value Date    K 4 2 06/15/2021     06/15/2021    CO2 28 06/15/2021    BUN 11 06/15/2021    CREATININE 0 88 06/15/2021    GLUF 85 06/15/2021    CALCIUM 9 2 06/15/2021    AST 22 06/15/2021    ALT 20 06/15/2021    ALKPHOS 122 (H) 06/15/2021    EGFR 108 06/15/2021     Lab Results   Component Value Date    WBC 3 56 (L) 06/15/2021    WBC 3 4 06/15/2021    HGB 12 6 06/15/2021    HGB 13 0 06/15/2021    HCT 37 6 06/15/2021    HCT 37 9 06/15/2021    MCV 91 06/15/2021    MCV 90 06/15/2021     (L) 06/15/2021     (L) 06/15/2021          Diagnoses and all orders for this visit:    Toxic effect of secondhand tobacco smoke, intentional self-harm, initial encounter ([place] [address] )    Symptomatic HIV infection ([place] [address] )  -     HIV-1 RNA, quantitative, PCR; Future  -     Comprehensive metabolic panel; Future  -     CBC and differential; Future  -     emtricitabine-tenofovir AF (DESCOVY) 200-25 MG tablet; Take 1 tablet by mouth daily  -     dolutegravir (TIVICAY) 50 MG TABS; Take 1 tablet (50 mg total) by mouth daily    Positive RPR test  -     RPR; Future    Need for pneumocystis prophylaxis  -     sulfamethoxazole-trimethoprim (BACTRIM DS) 800-160 mg per tablet; Take 1 tablet by mouth every 12 (twelve) hours    Thrush, oral  -     fluconazole (DIFLUCAN) 100 mg tablet; Take 1 tablet (100 mg total) by mouth daily for 14 days    AIDS (acquired immune deficiency syndrome) (HCC)  -     emtricitabine-tenofovir AF (DESCOVY) 200-25 MG tablet; Take 1 tablet by mouth daily  -     dolutegravir (TIVICAY) 50 MG TABS; Take 1 tablet (50 mg total) by mouth daily    Weight loss    Vaccine counseling          Subjective:      Patient ID: Izabela Payan is a 45 y o  male  HPI  Pt is being seen for 1 month follow up for management of chronic health care conditions    Pt reports has been doing ok  Pt does have poor appetite and has used supplement drinks  Pt has been playing basketball outside and does feel tired at end of day  Pt did noticed some white spots on roof of mouth  Pt does have an occasional HA that resolved with laying down  The following portions of the patient's history were reviewed and updated as appropriate: allergies, current medications, past family history, past medical history, past social history, past surgical history and problem list     Review of Systems   Constitutional: Positive for appetite change  HENT:        White spots on roof of mouth  Respiratory: Negative  Cardiovascular: Negative  Gastrointestinal: Negative  Genitourinary: Negative  Denies any chancre, sore groin, penile discharge, or lumps or bumps on penis  Musculoskeletal: Negative  Neurological: Negative  Psychiatric/Behavioral: Negative  Objective:      /59 (BP Location: Right arm, Patient Position: Sitting, Cuff Size: Standard)   Pulse 78   Temp (!) 96 6 °F (35 9 °C)   Resp 16   Ht 6' 3" (1 905 m)   Wt 78 8 kg (173 lb 12 8 oz)   SpO2 98%   BMI 21 72 kg/m²          Physical Exam  Vitals and nursing note reviewed  Constitutional:       General: He is not in acute distress  Appearance: Normal appearance  He is not ill-appearing  HENT:      Head: Normocephalic  Nose: Nose normal       Mouth/Throat:      Mouth: Mucous membranes are moist       Pharynx: Oropharyngeal exudate present  Comments: Several white clusters of exudate on roof of mouth, tongue, and side pockets of cheeks  Cardiovascular:      Rate and Rhythm: Normal rate and regular rhythm  Chest Wall: PMI is not displaced  Pulses: Normal pulses  Heart sounds: Normal heart sounds  Pulmonary:      Effort: Pulmonary effort is normal  No respiratory distress  Breath sounds: Wheezing present        Comments: Scattered expiratory wheezes  Abdominal:      General: Bowel sounds are normal       Palpations: Abdomen is soft  Musculoskeletal:         General: Normal range of motion  Cervical back: No tenderness  Lymphadenopathy:      Cervical: No cervical adenopathy  Skin:     General: Skin is warm and dry  Capillary Refill: Capillary refill takes less than 2 seconds  Neurological:      Mental Status: He is alert and oriented to person, place, and time  Psychiatric:         Mood and Affect: Mood normal          Behavior: Behavior normal          Thought Content:  Thought content normal          Judgment: Judgment normal

## 2021-06-25 ENCOUNTER — PATIENT OUTREACH (OUTPATIENT)
Dept: SURGERY | Facility: CLINIC | Age: 41
End: 2021-06-25

## 2021-06-25 NOTE — PROGRESS NOTES
Assessment    Annual nutrition assessment   Establish care     Clinical Data/Client History    HIV: yes  AIDS: yes    : Natalie Galindo     Socio- Economic Status: SNAP, Eats Out, Cooks and Grocery shops    Living Situation: House and [place] with mom    Food Prep/Access: Refrigerator, Stove and Microwave    Psycho Social Factors: Not discussed; Depression and Anxiety in chart records    Functional Status: Ambulatory, Able to [employer] and Prepared Own Meals    Activity Level: Plays basketball, boxes     Ambulation Difficulty with N/A    Oral Problems: Pt denied difficulty chewing/swallowing     Last Dental Exam: Long time     Procedures Performed: n/a; recommended f/u    Typical Food/Beverage Intake:    · Breakfast eggs, salami, rodriguez, bread, orange juice  · Lunch beans, stewed chicken   · Dinner rice, beans, pernil, sometimes seafood (fish, octopus)  · Snacks banana, cereal, fruit   · Fluids water, soda     Appetite: Poor    Supplements: Yes  Ensure    Food Intolerance: Pt denied n/v/d; denied constipation; denied known food allergies     Weight Change Percent: 7# (4%) unintended loss     Time Period of Weight Change: 1 month    Significant % Weight Loss: 4%    Usual Body Weight:   Wt Readings from Last 3 Encounters:   06/24/21 78 8 kg (173 lb 12 8 oz)   05/20/21 81 7 kg (180 lb 3 2 oz)     Pt reported #    Current Body Weight:   173# (78 8 kg)  BMI 21 72  # (89 1 kg)  6' 3" (1 905 m)    Nutrition Diagnosis    Problem: Unitended weight loss    Related to:  Increased energy needs and Estimated intake inconsistent with measured nutritional needs    As Evidenced By: Weight Loss    Intervention Diet Prescription    Nutritional needs based on:   Current BW, active, CD4 21, IBW    · 1072-3483 kcal  · 118-134 g protein  · 9562-3145 ml fluid  · 2 g Na    Current intake: Inadequate    Snack/Supplement Recommendations: Start Ensure 1-2 daily    Nutrition Recommendations: Small frequent meals throughout the day as tolerated including vegetables, lean proteins, prevent further weight loss    Goals: Phil to begin 1-2 Ensure daily as needed for added kcal/protein     Nutrition Education Intervention: Provided     Person Educated: patient    Topics Discussed: General assessment items, eating regularly throughout the day, Ensure supplements     Teaching Method: Verbal    Readiness to Change: Preparation:  (Getting ready to change)     Visit Summary    RD met with Jorge Molly in conjunction with PCP appt, to complete annual nutrition assessment  [place]ounter [place] [facility] [place] with GAUDENCIO  Jorge Barnes has experienced significant weight loss, 7# over one month  Pt reported that his weight before falling ill was ~240#  Pt reported a decent PO intake, though he reported having poor appetite and feeling full very fast   He recently started drinking Ensure provided to him by neighbor, discussed that clinic will put in Rx order for him to continue 1-2 daily as he needs  Pt prefers vanilla  Instructed pt to separate 6 hrs from ART  Spent time discussing vegetables he like, encouraged vegetable intake as well as plenty of lean proteins  Encouraged him to continue eating through the day  Jorge Barnes had no questions or concerns to discuss with RD at present  Lab Results   Component Value Date    HGBA1C 5 9 (H) 06/15/2021       Total protein 9 4     Monitor weight trend, HgbA1C, diet and exercise        Denver Barton, MS, RD, LDN

## 2021-06-28 DIAGNOSIS — R63.4 UNINTENTIONAL WEIGHT LOSS: Primary | ICD-10-CM

## 2021-06-29 RX ORDER — LACTOSE-REDUCED FOOD
2 LIQUID (ML) ORAL DAILY
Qty: 15000 ML | Refills: 0 | Status: SHIPPED | OUTPATIENT
Start: 2021-06-29 | End: 2021-07-16

## 2021-06-30 ENCOUNTER — TELEPHONE (OUTPATIENT)
Dept: SURGERY | Facility: CLINIC | Age: 41
End: 2021-06-30

## 2021-06-30 NOTE — TELEPHONE ENCOUNTER
Left message for [facility] pharmacy regarding medication and arrival time  Faxed patient's demographics for [facility] to update

## 2021-06-30 NOTE — TELEPHONE ENCOUNTER
Patient would like to know if he is able to get "injection" to stop smoking  Reporting back pain in kidney/lung area, will feel short of breath  Uses inhaler and gets relief  Please advise  Thank you!

## 2021-07-01 NOTE — TELEPHONE ENCOUNTER
Patient reports itching and rash all over body, believes it's from gum and patches  Please advise  Thank you!

## 2021-07-02 ENCOUNTER — TELEPHONE (OUTPATIENT)
Dept: SURGERY | Facility: CLINIC | Age: 41
End: 2021-07-02

## 2021-07-02 ENCOUNTER — PATIENT OUTREACH (OUTPATIENT)
Dept: SURGERY | Facility: CLINIC | Age: 41
End: 2021-07-02

## 2021-07-02 NOTE — TELEPHONE ENCOUNTER
Spoke with Suri Aggarwal @ [facility] regarding patient's syphilis treatment and provided phone # for [facility] to receive medical records for treatment information          Left message to obtain medical records

## 2021-07-02 NOTE — PROGRESS NOTES
CM and Nurse Vishal Dejesus discussed CT and how he [facility]  Records from prior tx will try to be retrieved through his previous  in [place]  Vishal Dejesus woud like to see what tx he received for syphilis in the past  Vishal Dejesus will update CM when she finds out    CM completed acuity scale   Ct is a Level 2 with a score of 31

## 2021-07-09 ENCOUNTER — PATIENT OUTREACH (OUTPATIENT)
Dept: SURGERY | Facility: CLINIC | Age: 41
End: 2021-07-09

## 2021-07-09 NOTE — PROGRESS NOTES
Nurse Asher Hampton contacted Cm informing her that Ct called stating that he was kicked out of his mothers home because he and the mothers boyfriend got into a disagreement  Asher Hampton shared that Ct stated he had nowhere to go  Asher Hampton contacted [facility] [facility] but in order to be placed there, the person needs to be referred by children and youth  Asher Hampton also contacted [employer] but has not been called back yet  CM suggested that Ct fo to rescue mission in [facility] but he would need to get a voucher from [employer] first  CM told Asher Hampton that she will contact Ct tro give him resources that he can look into  Asher Hampton stated that when she asked Ct to come in to office, he refused  Asher Hampton also mentioned [address] but that also just gives resources that Ct may not be eligible for  CM attempted to contact CT but his cell phone went right to voicemail   CM left a message

## 2021-07-12 ENCOUNTER — DOCUMENTATION (OUTPATIENT)
Dept: SURGERY | Facility: CLINIC | Age: 41
End: 2021-07-12

## 2021-07-12 NOTE — PROGRESS NOTES
Patient presents at clinic as requested as a means to, review medication schedule  Prior to treatment discussion, patient reported situational housing status resolved  Patient stated had a favorable discussion with mother's boyfriend, and both agreed to maintain a mutually respected relationship  At present, patient currently residing back at mother's home  Reviewed dosage schedule for ordered medications (Tivicay, Descovy, Bactrim, Albuterol inhaler, Ensure, and Fluconazole)  Patient stated has not taken HIV regimen including Bactrim and Fluconazole  As a result, concern of non-adherence discussed with PCP and patient instructed to take all medication as scheduled  Again, as stressed by PCP in prior visit, patient educated on importance/significance of not only taking medication as prescribed, but also maintaining  treatment adherence  Patient acknowledged understanding of such  Pill box filled with prescribed medication  In addition, [facility] pharmacy was contacted and future refills of medication will be dispensed in a blister packet  Patient to return to clinic/nurse visit following Monday (7/19/2021)  Patient also instructed to call clinic immediately should concerns/questions arise prior to scheduled visit

## 2021-07-13 ENCOUNTER — PATIENT OUTREACH (OUTPATIENT)
Dept: SURGERY | Facility: CLINIC | Age: 41
End: 2021-07-13

## 2021-07-13 NOTE — PROGRESS NOTES
CM returned Ct call  Ct wanted assistance with social security paperwork  CM advised Ct to make an appt at social security for help filling that paperwork out  CM scheduled roberta ppt with CT to assist him in making an appt with social security for assistance with application   Cm and Ct will meet tomorrow 7/14/2021 at 2:00pm

## 2021-07-20 ENCOUNTER — OFFICE VISIT (OUTPATIENT)
Dept: SURGERY | Facility: CLINIC | Age: 41
End: 2021-07-20
Payer: COMMERCIAL

## 2021-07-20 VITALS
HEIGHT: 75 IN | TEMPERATURE: 96.2 F | OXYGEN SATURATION: 98 % | WEIGHT: 174 LBS | BODY MASS INDEX: 21.64 KG/M2 | DIASTOLIC BLOOD PRESSURE: 62 MMHG | HEART RATE: 65 BPM | SYSTOLIC BLOOD PRESSURE: 111 MMHG | RESPIRATION RATE: 16 BRPM

## 2021-07-20 DIAGNOSIS — B20 SYMPTOMATIC HIV INFECTION (HCC): Primary | ICD-10-CM

## 2021-07-20 DIAGNOSIS — B37.0 THRUSH, ORAL: ICD-10-CM

## 2021-07-20 DIAGNOSIS — F41.9 ANXIETY: ICD-10-CM

## 2021-07-20 DIAGNOSIS — Z29.8 NEED FOR PNEUMOCYSTIS PROPHYLAXIS: ICD-10-CM

## 2021-07-20 DIAGNOSIS — T65.222A: ICD-10-CM

## 2021-07-20 DIAGNOSIS — F32.A DEPRESSION, UNSPECIFIED DEPRESSION TYPE: ICD-10-CM

## 2021-07-20 DIAGNOSIS — A53.0 POSITIVE RPR TEST: ICD-10-CM

## 2021-07-20 PROCEDURE — 99214 OFFICE O/P EST MOD 30 MIN: CPT | Performed by: NURSE PRACTITIONER

## 2021-07-20 RX ORDER — SULFAMETHOXAZOLE AND TRIMETHOPRIM 800; 160 MG/1; MG/1
1 TABLET ORAL DAILY
Qty: 30 TABLET | Refills: 5 | Status: SHIPPED | OUTPATIENT
Start: 2021-07-20 | End: 2022-01-03

## 2021-07-20 NOTE — PROGRESS NOTES
RD met with Paula Leong in conjunction with PCP appt, to check in on nutritional status and offer [employer] mobile market voucher  Paula Salo reported having good appetite, and has been "eating a lot" including fish, salads, vianda, potatoes  Pt reported eating every 3 hours or so  He has been drinking 1 Ensure daily and  out from ART  Pt states he has 10 Ensure left, and will call the office if he feels that he needs more  Will hold off on reordering for now as weight has stabilized, BMI within normal weight parameters, and he reported having much improved appetite  Pt does receive SNAP, RD explained [place], provided flyer and pointed to list of nearby participating locations  Pt was receptive to [place] $20 weekly voucher for [facility]  RD provided voucher #[id number], and schedule  Pt had no questions or concerns to discuss with RD at present  Wt Readings from Last 3 Encounters:   07/20/21 78 9 kg (174 lb)   06/24/21 78 8 kg (173 lb 12 8 oz)   05/20/21 81 7 kg (180 lb 3 2 oz)     Body mass index is 21 75 kg/m²  Monitor weight trend, diet and exercise        Teresa Holloway MS, RD, LDN

## 2021-07-20 NOTE — PROGRESS NOTES
Assessment/Plan:    Symptomatic HIV infection ([address] )  Doing good  Pt reports 100% medication compliance  · Stressed the importance of 100% medication adherence  · No signs of OI's or illness  · Reminder for pt to complete labs on 21  · Educated pt on the importance of  Ensure from HAART by six hours    HIV Counseling:    Viral Load: [id number] copies     CD4 Count: 21    ART: [address]    Denies side effects  Stressed the importance of adherence  Continue follow up in the ID clinic with Dr Matthew Méndez  Reviewed the most recent labs, including the Cd4 and viral load  Discussed the risks and benefits of treatment options, instructions for management, importance of treatment adherence, and reduction of risk factors  Educated on possible medication side effects  Counseled on routes of HIV transmission, including the risk of  infection  Emphasized that viral suppression is the best method to prevent HIV transmission  At this time the pt denies the need for HIV testing of anyone in their life  Total encounter time was greater than 35 minutes  Greater than 20 minutes were spent on counseling and patient education  Pt voices understanding and agreement with treatment plan  Need for pneumocystis prophylaxis  Pt reports taking Bactrim DS    · Continue Bactrim DS  · Monitor VL and CD4    Depression  I feel no self worth, sad, and poorer health and has made him sad since he sees other peoples health care and living situation  Pt inquired about having a ICM since he used to have one at his previous office  Pt denies any SI or SA  · [facility]    Thrush, oral  Doing better  Oral thrush had resolved at this time  Pt has almost completed his medication  · Continue to monitor    Positive RPR test  Pt does not no where to go for treatment  Will reach out to [facility] to see if insurance covers shots in the office       · Follow up with CC[facility]    Toxic effect of secondhand tobacco smoke    Still smoking about 3 per day  Pt attempted to use nicotine patches and gum but develop a rash at site of patch and developed heart burn when he chewed the gum  Pt does want to stop smoking  Nicotine Dependency - Primary    Counseled for greater than 15 minutes on the importance of smoking cessation  Education was given regarding the cardiovascular effects of how nicotine affects the heart, lungs, kidneys,and peripheral vascular system  Referred to [facility] [address] [place] [address] for enrollment in smoking cessation program       · Refer to [facility]       Diagnoses and all orders for this visit:    Symptomatic HIV infection ([facility] )    Need for pneumocystis prophylaxis  -     sulfamethoxazole-trimethoprim (BACTRIM DS) 800-160 mg per tablet; Take 1 tablet by mouth daily    Depression, unspecified depression type  -     Ambulatory referral to behavioral health therapists; Future    Anxiety  -     Ambulatory referral to behavioral health therapists; Future    Thrush, oral    Positive RPR test    Toxic effect of secondhand tobacco smoke, intentional self-harm, initial encounter ([place] [address] )          Subjective:      Patient ID: Rick Rebolledo is a 37 y o  male  HPI  Pt is being seen today for 1 month follow up for management of chronic health care conditions  In the setting of AIDS, pt was restarted on HAART but has not started taking HAART until   Pt reports he has been feeling fine and is sleeping better  Pt is eating everything and weight has stayed stable  First day of this month is when he took his HAART and felt dizzy but has since resolved  Pt is [place] Islander speaking only and interpretor was present during the entire visit  The following portions of the patient's history were reviewed and updated as appropriate: allergies, current medications, past medical history, past social history and problem list     Review of Systems   Constitutional: Negative  HENT: Negative  Respiratory: Negative  Cardiovascular: Negative  Neurological: Negative  Psychiatric/Behavioral:        Depression with feelings of self worthlessness and hope  Objective:      /62 (BP Location: Right arm, Patient Position: Sitting, Cuff Size: Standard)   Pulse 65   Temp (!) 96 2 °F (35 7 °C)   Resp 16   Ht 6' 3" (1 905 m)   Wt 78 9 kg (174 lb)   SpO2 98%   BMI 21 75 kg/m²          Physical Exam  Vitals and nursing note reviewed  Constitutional:       General: He is not in acute distress  Appearance: Normal appearance  He is not ill-appearing  HENT:      Right Ear: Tympanic membrane normal       Left Ear: Tympanic membrane normal       Nose: Nose normal  No congestion  Mouth/Throat:      Mouth: Mucous membranes are moist       Pharynx: Oropharynx is clear  Cardiovascular:      Rate and Rhythm: Normal rate and regular rhythm  Chest Wall: PMI is not displaced  Pulses: Normal pulses  Heart sounds: Normal heart sounds  Pulmonary:      Effort: Pulmonary effort is normal       Breath sounds: Normal breath sounds  Musculoskeletal:         General: Normal range of motion  Lymphadenopathy:      Cervical: No cervical adenopathy  Skin:     General: Skin is warm and dry  Capillary Refill: Capillary refill takes less than 2 seconds  Neurological:      Mental Status: He is alert and oriented to person, place, and time  Psychiatric:         Mood and Affect: Mood normal          Behavior: Behavior normal          Thought Content:  Thought content normal          Judgment: Judgment normal

## 2021-07-20 NOTE — ASSESSMENT & PLAN NOTE
Doing better  Oral thrush had resolved at this time  Pt has almost completed his medication      · Continue to monitor

## 2021-07-20 NOTE — ASSESSMENT & PLAN NOTE
I feel no self worth, sad, and poorer health and has made him sad since he sees other peoples health care and living situation  Pt inquired about having a ICM since he used to have one at his previous office  Pt denies any SI or SA      · Refer to [facility] for CBT

## 2021-07-20 NOTE — ASSESSMENT & PLAN NOTE
Still smoking about 3 per day  Pt attempted to use nicotine patches and gum but develop a rash at site of patch and developed heart burn when he chewed the gum  Pt does want to stop smoking  Nicotine Dependency - Primary    Counseled for greater than 15 minutes on the importance of smoking cessation  Education was given regarding the cardiovascular effects of how nicotine affects the heart, lungs, kidneys,and peripheral vascular system    Referred to [facility] [address] for enrollment in smoking cessation program       · Refer to [facility]

## 2021-07-30 ENCOUNTER — TELEPHONE (OUTPATIENT)
Dept: SURGERY | Facility: CLINIC | Age: 41
End: 2021-07-30

## 2021-07-30 NOTE — TELEPHONE ENCOUNTER
Left message for patient, after speaking with [facility] refill has been sent out and should have been received on Thursday  For his next refill, contact Gianfranco Foster

## 2021-08-17 ENCOUNTER — PATIENT OUTREACH (OUTPATIENT)
Dept: SURGERY | Facility: CLINIC | Age: 41
End: 2021-08-17

## 2021-08-19 ENCOUNTER — PATIENT OUTREACH (OUTPATIENT)
Dept: SURGERY | Facility: CLINIC | Age: 41
End: 2021-08-19

## 2021-08-24 ENCOUNTER — TELEPHONE (OUTPATIENT)
Dept: SURGERY | Facility: CLINIC | Age: 41
End: 2021-08-24

## 2021-08-24 ENCOUNTER — PATIENT OUTREACH (OUTPATIENT)
Dept: SURGERY | Facility: CLINIC | Age: 41
End: 2021-08-24

## 2021-08-24 NOTE — PROGRESS NOTES
CT was discussed in huddle this morning  Ct needs to complete labs and he also needs to come in for syphilis vaccine  CM called cell and was unable to leave message  CM called his mothers cell and left message for CT to return call

## 2021-08-25 ENCOUNTER — PATIENT OUTREACH (OUTPATIENT)
Dept: SURGERY | Facility: CLINIC | Age: 41
End: 2021-08-25

## 2021-08-25 NOTE — PROGRESS NOTES
CM called and spoke with CT  Ct was reached on his mothers cell phone as his own phone was shut off  CM reminded Ct that he needs to come in for his syphilis injection with Nurse Cesar Blanco  CM spoke with Cesar Blanco and she stated that she will be available all day tomorrow  I suggested 10am and Nurse Cesar Blanco agreed  CM relayed info to CT about coming in tomorrow 8/26/2021 at 10am to receive his syphilis injection, CT agreed

## 2021-08-26 ENCOUNTER — CLINICAL SUPPORT (OUTPATIENT)
Dept: SURGERY | Facility: CLINIC | Age: 41
End: 2021-08-26

## 2021-08-26 ENCOUNTER — PATIENT OUTREACH (OUTPATIENT)
Dept: SURGERY | Facility: CLINIC | Age: 41
End: 2021-08-26

## 2021-08-26 DIAGNOSIS — A53.9 SYPHILIS: Primary | ICD-10-CM

## 2021-09-02 ENCOUNTER — OFFICE VISIT (OUTPATIENT)
Dept: SURGERY | Facility: CLINIC | Age: 41
End: 2021-09-02
Payer: COMMERCIAL

## 2021-09-02 ENCOUNTER — PATIENT OUTREACH (OUTPATIENT)
Dept: SURGERY | Facility: CLINIC | Age: 41
End: 2021-09-02

## 2021-09-02 VITALS
DIASTOLIC BLOOD PRESSURE: 76 MMHG | SYSTOLIC BLOOD PRESSURE: 114 MMHG | HEART RATE: 71 BPM | OXYGEN SATURATION: 96 % | TEMPERATURE: 96.6 F

## 2021-09-02 DIAGNOSIS — A53.9 SYPHILIS: Primary | ICD-10-CM

## 2021-09-02 DIAGNOSIS — A53.0 POSITIVE RPR TEST: ICD-10-CM

## 2021-09-02 PROCEDURE — 99212 OFFICE O/P EST SF 10 MIN: CPT | Performed by: NURSE PRACTITIONER

## 2021-09-02 NOTE — PROGRESS NOTES
Pt received 2nd Bicillin dose 2 4 million units IM in Right gluteal muscle  Band-Aid applied  Pt tolerated procedure well  Pt did wait in office for 10 minutes after inject  Pt will return on 9/10/2021 for 3rd dose

## 2021-09-02 NOTE — PROGRESS NOTES
CT presented for his injection appt today with Linda Brown after being reminded  CT stated that he wanted to meet with CM next week after his injection appt  Lala called CM to see availability   CT was put in CM schedule for 9/10/2021 at 11:00am

## 2021-09-03 ENCOUNTER — PATIENT OUTREACH (OUTPATIENT)
Dept: SURGERY | Facility: CLINIC | Age: 41
End: 2021-09-03

## 2021-09-09 NOTE — ASSESSMENT & PLAN NOTE
Doing good  Pt reports 100% medication compliance  AIDS in the setting of low CD4 count  Pt has long standing hx of noncompliance  Pt is poor historian  No signs of OI's  · Stressed the importance of 100% medication adherence  · Continue to monitor for OI's  · Complete labs for ID clinic    HIV Counseling:    Viral Load: 21    CD4 Count: [id number] copies     ART: [address]     Denies side effects  Stressed the importance of adherence  Continue follow up in the ID clinic with Dr Elliott Butler  Reviewed the most recent labs, including the CD4 and viral load  Discussed the risks and benefits of treatment options, instructions for management, importance of treatment adherence, and reduction of risk factors  Educated on possible medication side effects  Counseled on routes of HIV transmission, including the risk of  infection  Emphasized that viral suppression is the best method to prevent HIV transmission  At this time the pt denies the need for HIV testing of anyone in their life  Total encounter time was greater than 35 minutes  Greater than 20 minutes were spent on counseling and patient education  Pt voices understanding and agreement with treatment plan

## 2021-09-09 NOTE — ASSESSMENT & PLAN NOTE
Feeling fine  Pt is receiving 3rd dose of Bicillin 2 4 million units IM      · F/u RPR in 3 months  · Stress the importance of wearing condoms

## 2021-09-09 NOTE — PROGRESS NOTES
Assessment/Plan:    Symptomatic HIV infection ([facility] [address] )  Doing good  Pt reports 100% medication compliance  AIDS in the setting of low CD4 count  Pt has long standing hx of noncompliance  Pt is poor historian  No signs of OI's  · Stressed the importance of 100% medication adherence  · Continue to monitor for OI's  · Complete labs for ID clinic    HIV Counseling:    Viral Load: 21    CD4 Count: [id number] copies     ART: [address]     Denies side effects  Stressed the importance of adherence  Continue follow up in the ID clinic with Dr Di Parry  Reviewed the most recent labs, including the CD4 and viral load  Discussed the risks and benefits of treatment options, instructions for management, importance of treatment adherence, and reduction of risk factors  Educated on possible medication side effects  Counseled on routes of HIV transmission, including the risk of  infection  Emphasized that viral suppression is the best method to prevent HIV transmission  At this time the pt denies the need for HIV testing of anyone in their life  Total encounter time was greater than 35 minutes  Greater than 20 minutes were spent on counseling and patient education  Pt voices understanding and agreement with treatment plan  Syphilis  Feeling fine  Pt is receiving 3rd dose of Bicillin 2 4 million units IM  · F/u RPR in 3 months  · Stress the importance of wearing condoms    Need for pneumocystis prophylaxis  CD4< 200 in the setting of AIDS  · Continue to monitor CD4 and VL  · Continue Bactrim     Toxic effect of tobacco cigarette  Continues  Pt is smoking 2 ppd due to stress  · Continue to encourage complete smoking cessation  Nicotine Dependency - Primary    Counseled for greater than 15 minutes on the importance of smoking cessation  Education was given regarding the cardiovascular effects of how nicotine affects the heart, lungs, kidneys, and peripheral vascular system    Referred to [facility] for enrollment in smoking cessation program       Depression  Today was a bad day  Pt denies any desire to harm himself or others  Pt used to be engage with [facility] and believes he took Seroquel but has been off of this med for 4 years  · Refer to [facility]  · Continue to monitor          Diagnoses and all orders for this visit:    Symptomatic HIV infection ([address] )    Positive RPR test  -     penicillin G benzathine (BICILLIN L-A) intramuscular injection 2 4 Million Units    Need for pneumocystis prophylaxis    Depression, unspecified depression type    Syphilis          Subjective:      Patient ID: Juan Miguel Sparks is a 37 y o  male  HPI  Pt is being seen for 1 month follow up for management of HIV and chronic health care conditions  Pt reports health wise he is ok but is feeling very depressed today  Pt has been thinking about how he got HIV, the years he was incarcerated, not having a job, wanting to live by himself, and finding an ICM  Pt was incarcerated for burglary and is having trouble finding work  Pt feels he has served his time and made amends for what he did  The following portions of the patient's history were reviewed and updated as appropriate: allergies, current medications, past family history, past medical history, past social history and problem list     Review of Systems   Constitutional: Negative  HENT: Negative  Respiratory: Negative  Cardiovascular: Negative  Gastrointestinal: Negative  Neurological: Negative  Psychiatric/Behavioral: Negative  Objective:      /63 (BP Location: Right arm, Patient Position: Sitting, Cuff Size: Standard)   Pulse 69   Temp (!) 96 °F (35 6 °C)   Resp 18   Ht 6' 3" (1 905 m)   Wt 83 6 kg (184 lb 3 2 oz)   SpO2 99%   BMI 23 02 kg/m²          Physical Exam  Vitals and nursing note reviewed  Constitutional:       General: He is not in acute distress  Appearance: Normal appearance   He is not ill-appearing  Cardiovascular:      Rate and Rhythm: Normal rate and regular rhythm  Chest Wall: PMI is not displaced  Pulses: Normal pulses  Heart sounds: Normal heart sounds  Pulmonary:      Effort: Pulmonary effort is normal       Breath sounds: Normal breath sounds  Abdominal:      General: Bowel sounds are normal       Palpations: Abdomen is soft  Musculoskeletal:         General: Normal range of motion  Skin:     General: Skin is warm and dry  Capillary Refill: Capillary refill takes less than 2 seconds  Neurological:      Mental Status: He is alert and oriented to person, place, and time  Psychiatric:         Mood and Affect: Mood normal          Behavior: Behavior normal          Thought Content:  Thought content normal          Judgment: Judgment normal

## 2021-09-10 ENCOUNTER — OFFICE VISIT (OUTPATIENT)
Dept: SURGERY | Facility: CLINIC | Age: 41
End: 2021-09-10
Payer: COMMERCIAL

## 2021-09-10 ENCOUNTER — PATIENT OUTREACH (OUTPATIENT)
Dept: SURGERY | Facility: CLINIC | Age: 41
End: 2021-09-10

## 2021-09-10 VITALS
HEIGHT: 75 IN | DIASTOLIC BLOOD PRESSURE: 63 MMHG | WEIGHT: 184.2 LBS | TEMPERATURE: 96 F | OXYGEN SATURATION: 99 % | SYSTOLIC BLOOD PRESSURE: 131 MMHG | HEART RATE: 69 BPM | BODY MASS INDEX: 22.9 KG/M2 | RESPIRATION RATE: 18 BRPM

## 2021-09-10 DIAGNOSIS — A53.9 SYPHILIS: ICD-10-CM

## 2021-09-10 DIAGNOSIS — F32.A DEPRESSION, UNSPECIFIED DEPRESSION TYPE: ICD-10-CM

## 2021-09-10 DIAGNOSIS — A53.0 POSITIVE RPR TEST: ICD-10-CM

## 2021-09-10 DIAGNOSIS — B20 SYMPTOMATIC HIV INFECTION (HCC): Primary | ICD-10-CM

## 2021-09-10 DIAGNOSIS — Z29.8 NEED FOR PNEUMOCYSTIS PROPHYLAXIS: ICD-10-CM

## 2021-09-10 PROCEDURE — 99214 OFFICE O/P EST MOD 30 MIN: CPT | Performed by: NURSE PRACTITIONER

## 2021-09-10 NOTE — ASSESSMENT & PLAN NOTE
Continues  Pt is smoking 2 ppd due to stress  · Continue to encourage complete smoking cessation  Nicotine Dependency - Primary    Counseled for greater than 15 minutes on the importance of smoking cessation  Education was given regarding the cardiovascular effects of how nicotine affects the heart, lungs, kidneys, and peripheral vascular system    Referred to [facility] for enrollment in smoking cessation program

## 2021-09-10 NOTE — ASSESSMENT & PLAN NOTE
Today was a bad day  Pt denies any desire to harm himself or others  Pt used to be engage with [address] and believes he took Seroquel but has been off of this med for 4 years      · Refer to [facility]  · Continue to monitor

## 2021-09-10 NOTE — PROGRESS NOTES
RD met with Millie Peña in conjunction with PCP appt, to check in on nutritional status and offer support and education as warranted and pt receptive  Millie Peña reported that his appetite has been fluctuating, and some days he eats more than other days  He agreed however that he would not need Ensure, as he was aware of weight gain  RD assured pt that his weight is within normal weight parameters, no need to lose weight  RD encouraged him to call should he feel that he needs nutritional supplement due to poor appetite/inadequate intake  Pt has not yet utilized mobile market voucher, RD explained nearby location and he will plan to use and acquire fresh fruits and vegetables  Pt did appear tired  He had no questions or concerns to discuss with RD at present  Wt Readings from Last 3 Encounters:   09/10/21 83 6 kg (184 lb 3 2 oz)   07/20/21 78 9 kg (174 lb)   06/24/21 78 8 kg (173 lb 12 8 oz)     Body mass index is 23 02 kg/m²  Lab Results   Component Value Date    HGBA1C 5 9 (H) 06/15/2021     Monitor weight trend, dietary habits        Walt Medrano, MS, RD, LDN

## 2021-09-13 NOTE — PROGRESS NOTES
CT was discussed with clinic as he came for his injection  Ct seemed to down and off yesterday  CM explained to clinic how CM and CT visit went  Ct needed help with birth certificate paperwork  Ct presents as he cant do anything for himself and has all these needs but when things are suggested to him, he has excuses as to why he cannot complete them  Ct does not take any responsibility for what he needs to accomplish in order to do what he needs to do like move out of his mothers home  CM suggested looking for work but Ct uses the " I have felonies " excuse which is not a reason for him not to find work, especially in this current job market  CT seems to tell clinic staff one thing and tell CM another

## 2021-09-14 DIAGNOSIS — B20 SYMPTOMATIC HIV INFECTION (HCC): ICD-10-CM

## 2021-09-14 DIAGNOSIS — B20 AIDS (ACQUIRED IMMUNE DEFICIENCY SYNDROME) (HCC): ICD-10-CM
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
## 2021-09-15 ENCOUNTER — PATIENT OUTREACH (OUTPATIENT)
Dept: SURGERY | Facility: CLINIC | Age: 41
End: 2021-09-15

## 2021-09-15 RX ORDER — DOLUTEGRAVIR SODIUM 50 MG/1
TABLET, FILM COATED ORAL
Qty: 30 TABLET | Refills: 2 | Status: SHIPPED | OUTPATIENT
Start: 2021-09-15 | End: 2021-12-06

## 2021-09-15 RX ORDER — EMTRICITABINE AND TENOFOVIR ALAFENAMIDE 200; 25 MG/1; MG/1
TABLET ORAL
Qty: 30 TABLET | Refills: 2 | Status: SHIPPED | OUTPATIENT
Start: 2021-09-15 | End: 2021-12-06

## 2021-09-15 NOTE — PROGRESS NOTES
CM received message from [facility] in regards to pt wanting to receive [facility] services  CM informed Milton Pang that CT was referred to [place] [id number] and stated that he has not met with psychiatrist because he is on vacation but does have a therapist  CTs story to [facility] is contradicting what he told CM   CM will f/u with CT

## 2021-09-16 ENCOUNTER — PATIENT OUTREACH (OUTPATIENT)
Dept: SURGERY | Facility: CLINIC | Age: 41
End: 2021-09-16

## 2021-09-16 NOTE — PROGRESS NOTES
CM received inbasket in reference to Ct and MH  Ct told CM that he is receiving MH services at [facility] but is telling [facility] that he is not receiving services  In order to best help CT, CM called CT to inquire what is going on  CT is not available and CM left message for him to return call  Ct called back and when CM asked about MH Ct shared that he had gone to [facility] for mental health but did not like what he had seen  CM then reminded Ct that last week he had told CM that he was a patient there and that he was unable to meet with psychiatrist because they were on vacation  CM asked ct if he wanted to attend [place] [address] somewhere and he stated that he did  CM also let Ct know that he would be responsible in securing transportation to and from where he is referred, Ct stated that he did not mind as he has a bicycle that he can drive  CM will reach out to [facility] for suggestions on where a referral should be sent for Ct

## 2021-09-20 ENCOUNTER — PATIENT OUTREACH (OUTPATIENT)
Dept: SURGERY | Facility: CLINIC | Age: 41
End: 2021-09-20

## 2021-09-20 NOTE — PROGRESS NOTES
and [facility] messaged back and forth in regards to CT and [facility] and  will meet to discuss further action needed for Ct, if he is willing

## 2021-09-24 ENCOUNTER — PATIENT OUTREACH (OUTPATIENT)
Dept: SURGERY | Facility: CLINIC | Age: 41
End: 2021-09-24

## 2021-09-28 NOTE — PROGRESS NOTES
CT was discussed with [address] [place] [address] in regards to contacting [facility] for ICM services  Ct is in need of MH and would benefit from [facility] services as well  CM will reach out to [facility] and find out more information about what is offered and how they may be able to help CT

## 2021-10-01 ENCOUNTER — PATIENT OUTREACH (OUTPATIENT)
Dept: SURGERY | Facility: CLINIC | Age: 41
End: 2021-10-01

## 2021-10-08 DIAGNOSIS — R63.4 UNINTENTIONAL WEIGHT LOSS: ICD-10-CM

## 2021-10-11 RX ORDER — LACTOSE-REDUCED FOOD
LIQUID (ML) ORAL
Qty: 5688 ML | Refills: 2 | Status: SHIPPED | OUTPATIENT
Start: 2021-10-11 | End: 2021-12-14 | Stop reason: SDUPTHER

## 2021-10-15 ENCOUNTER — PATIENT OUTREACH (OUTPATIENT)
Dept: SURGERY | Facility: CLINIC | Age: 41
End: 2021-10-15

## 2021-11-15 ENCOUNTER — PATIENT OUTREACH (OUTPATIENT)
Dept: SURGERY | Facility: CLINIC | Age: 41
End: 2021-11-15

## 2021-11-16 ENCOUNTER — APPOINTMENT (OUTPATIENT)
Dept: LAB | Facility: CLINIC | Age: 41
End: 2021-11-16
Payer: COMMERCIAL

## 2021-11-16 ENCOUNTER — PATIENT OUTREACH (OUTPATIENT)
Dept: SURGERY | Facility: CLINIC | Age: 41
End: 2021-11-16

## 2021-11-16 DIAGNOSIS — A53.0 POSITIVE RPR TEST: ICD-10-CM

## 2021-11-16 DIAGNOSIS — B20 SYMPTOMATIC HIV INFECTION (HCC): ICD-10-CM

## 2021-11-16 LAB
ALBUMIN SERPL BCP-MCNC: 3.9 G/DL (ref 3.5–5)
ALP SERPL-CCNC: 79 U/L (ref 46–116)
ALT SERPL W P-5'-P-CCNC: 17 U/L (ref 12–78)
ANION GAP SERPL CALCULATED.3IONS-SCNC: 3 MMOL/L (ref 4–13)
AST SERPL W P-5'-P-CCNC: 16 U/L (ref 5–45)
BASOPHILS # BLD AUTO: 0.04 THOUSANDS/ΜL (ref 0–0.1)
BASOPHILS NFR BLD AUTO: 1 % (ref 0–1)
BILIRUB SERPL-MCNC: 0.36 MG/DL (ref 0.2–1)
BUN SERPL-MCNC: 10 MG/DL (ref 5–25)
CALCIUM SERPL-MCNC: 9.5 MG/DL (ref 8.3–10.1)
CHLORIDE SERPL-SCNC: 106 MMOL/L (ref 100–108)
CO2 SERPL-SCNC: 29 MMOL/L (ref 21–32)
CREAT SERPL-MCNC: 1.19 MG/DL (ref 0.6–1.3)
EOSINOPHIL # BLD AUTO: 0.08 THOUSAND/ΜL (ref 0–0.61)
EOSINOPHIL NFR BLD AUTO: 2 % (ref 0–6)
ERYTHROCYTE [DISTWIDTH] IN BLOOD BY AUTOMATED COUNT: 13.4 % (ref 11.6–15.1)
GFR SERPL CREATININE-BSD FRML MDRD: 76 ML/MIN/1.73SQ M
GLUCOSE P FAST SERPL-MCNC: 86 MG/DL (ref 65–99)
HCT VFR BLD AUTO: 44 % (ref 36.5–49.3)
HGB BLD-MCNC: 14.9 G/DL (ref 12–17)
IMM GRANULOCYTES # BLD AUTO: 0.01 THOUSAND/UL (ref 0–0.2)
IMM GRANULOCYTES NFR BLD AUTO: 0 % (ref 0–2)
LYMPHOCYTES # BLD AUTO: 1.38 THOUSANDS/ΜL (ref 0.6–4.47)
LYMPHOCYTES NFR BLD AUTO: 25 % (ref 14–44)
MCH RBC QN AUTO: 32.6 PG (ref 26.8–34.3)
MCHC RBC AUTO-ENTMCNC: 33.9 G/DL (ref 31.4–37.4)
MCV RBC AUTO: 96 FL (ref 82–98)
MONOCYTES # BLD AUTO: 0.44 THOUSAND/ΜL (ref 0.17–1.22)
MONOCYTES NFR BLD AUTO: 8 % (ref 4–12)
NEUTROPHILS # BLD AUTO: 3.48 THOUSANDS/ΜL (ref 1.85–7.62)
NEUTS SEG NFR BLD AUTO: 64 % (ref 43–75)
NRBC BLD AUTO-RTO: 0 /100 WBCS
PLATELET # BLD AUTO: 184 THOUSANDS/UL (ref 149–390)
PMV BLD AUTO: 10 FL (ref 8.9–12.7)
POTASSIUM SERPL-SCNC: 3.8 MMOL/L (ref 3.5–5.3)
PROT SERPL-MCNC: 8.7 G/DL (ref 6.4–8.2)
RBC # BLD AUTO: 4.57 MILLION/UL (ref 3.88–5.62)
SODIUM SERPL-SCNC: 138 MMOL/L (ref 136–145)
WBC # BLD AUTO: 5.43 THOUSAND/UL (ref 4.31–10.16)

## 2021-11-16 PROCEDURE — 85025 COMPLETE CBC W/AUTO DIFF WBC: CPT

## 2021-11-16 PROCEDURE — 86592 SYPHILIS TEST NON-TREP QUAL: CPT

## 2021-11-16 PROCEDURE — 86593 SYPHILIS TEST NON-TREP QUANT: CPT

## 2021-11-16 PROCEDURE — 36415 COLL VENOUS BLD VENIPUNCTURE: CPT

## 2021-11-16 PROCEDURE — 86780 TREPONEMA PALLIDUM: CPT

## 2021-11-16 PROCEDURE — 80053 COMPREHEN METABOLIC PANEL: CPT

## 2021-11-17 ENCOUNTER — PATIENT OUTREACH (OUTPATIENT)
Dept: SURGERY | Facility: CLINIC | Age: 41
End: 2021-11-17

## 2021-11-17 LAB
RPR SER QL: REACTIVE
RPR SER-TITR: ABNORMAL {TITER}

## 2021-11-18 LAB — T PALLIDUM AB SER QL IF: REACTIVE

## 2021-11-24 ENCOUNTER — PATIENT OUTREACH (OUTPATIENT)
Dept: SURGERY | Facility: CLINIC | Age: 41
End: 2021-11-24

## 2021-12-09 DIAGNOSIS — R06.2 WHEEZING WITHOUT DIAGNOSIS OF ASTHMA: ICD-10-CM

## 2021-12-09 RX ORDER — ALBUTEROL SULFATE 90 UG/1
2 AEROSOL, METERED RESPIRATORY (INHALATION) EVERY 6 HOURS PRN
Qty: 18 G | Refills: 2 | Status: SHIPPED | OUTPATIENT
Start: 2021-12-09

## 2021-12-11 ENCOUNTER — APPOINTMENT (EMERGENCY)
Dept: RADIOLOGY | Facility: HOSPITAL | Age: 41
End: 2021-12-11
Payer: COMMERCIAL

## 2021-12-11 ENCOUNTER — HOSPITAL ENCOUNTER (EMERGENCY)
Facility: HOSPITAL | Age: 41
Discharge: HOME | End: 2021-12-11
Attending: EMERGENCY MEDICINE | Admitting: EMERGENCY MEDICINE
Payer: COMMERCIAL

## 2021-12-11 VITALS
WEIGHT: 190 LBS | OXYGEN SATURATION: 99 % | BODY MASS INDEX: 23.62 KG/M2 | DIASTOLIC BLOOD PRESSURE: 75 MMHG | TEMPERATURE: 98 F | RESPIRATION RATE: 20 BRPM | HEART RATE: 67 BPM | SYSTOLIC BLOOD PRESSURE: 125 MMHG | HEIGHT: 75 IN

## 2021-12-11 DIAGNOSIS — S62.623A DISPLACED FRACTURE OF MIDDLE PHALANX OF LEFT MIDDLE FINGER, INITIAL ENCOUNTER FOR CLOSED FRACTURE: Primary | ICD-10-CM

## 2021-12-11 PROCEDURE — 99285 EMERGENCY DEPT VISIT HI MDM: CPT | Performed by: EMERGENCY MEDICINE

## 2021-12-11 PROCEDURE — NC001 PR NO CHARGE: Performed by: ORTHOPAEDIC SURGERY

## 2021-12-11 PROCEDURE — 99284 EMERGENCY DEPT VISIT MOD MDM: CPT

## 2021-12-11 PROCEDURE — G1004 CDSM NDSC: HCPCS

## 2021-12-11 PROCEDURE — 73200 CT UPPER EXTREMITY W/O DYE: CPT

## 2021-12-11 PROCEDURE — 26725 TREAT FINGER FRACTURE EACH: CPT | Performed by: EMERGENCY MEDICINE

## 2021-12-11 PROCEDURE — 73130 X-RAY EXAM OF HAND: CPT

## 2021-12-11 PROCEDURE — 73140 X-RAY EXAM OF FINGER(S): CPT

## 2021-12-11 RX ORDER — LIDOCAINE HYDROCHLORIDE 10 MG/ML
10 INJECTION, SOLUTION EPIDURAL; INFILTRATION; INTRACAUDAL; PERINEURAL ONCE
Status: COMPLETED | OUTPATIENT
Start: 2021-12-11 | End: 2021-12-11

## 2021-12-11 RX ORDER — IBUPROFEN 600 MG/1
600 TABLET ORAL EVERY 6 HOURS PRN
Qty: 30 TABLET | Refills: 0 | Status: SHIPPED | OUTPATIENT
Start: 2021-12-11 | End: 2022-01-11 | Stop reason: SDUPTHER

## 2021-12-11 RX ORDER — LIDOCAINE HYDROCHLORIDE 10 MG/ML
20 INJECTION, SOLUTION EPIDURAL; INFILTRATION; INTRACAUDAL; PERINEURAL ONCE
Status: COMPLETED | OUTPATIENT
Start: 2021-12-11 | End: 2021-12-11

## 2021-12-11 RX ORDER — SENNOSIDES 8.6 MG
650 CAPSULE ORAL EVERY 8 HOURS PRN
Qty: 30 TABLET | Refills: 0 | Status: SHIPPED | OUTPATIENT
Start: 2021-12-11

## 2021-12-11 RX ORDER — IBUPROFEN 600 MG/1
600 TABLET ORAL ONCE
Status: COMPLETED | OUTPATIENT
Start: 2021-12-11 | End: 2021-12-11

## 2021-12-11 RX ADMIN — LIDOCAINE HYDROCHLORIDE 10 ML: 10 INJECTION, SOLUTION EPIDURAL; INFILTRATION; INTRACAUDAL; PERINEURAL at 17:44

## 2021-12-11 RX ADMIN — LIDOCAINE HYDROCHLORIDE 20 ML: 10 INJECTION, SOLUTION EPIDURAL; INFILTRATION; INTRACAUDAL; PERINEURAL at 20:35

## 2021-12-11 RX ADMIN — IBUPROFEN 600 MG: 600 TABLET, FILM COATED ORAL at 18:46

## 2021-12-14 ENCOUNTER — TELEPHONE (OUTPATIENT)
Dept: OBGYN CLINIC | Facility: HOSPITAL | Age: 41
End: 2021-12-14

## 2021-12-14 DIAGNOSIS — R63.4 UNINTENTIONAL WEIGHT LOSS: ICD-10-CM

## 2021-12-14 RX ORDER — LACTOSE-REDUCED FOOD
1 LIQUID (ML) ORAL DAILY
Qty: 7500 ML | Refills: 1 | Status: SHIPPED | OUTPATIENT
Start: 2021-12-14 | End: 2022-01-27 | Stop reason: ALTCHOICE

## 2021-12-21 ENCOUNTER — PATIENT OUTREACH (OUTPATIENT)
Dept: SURGERY | Facility: CLINIC | Age: 41
End: 2021-12-21

## 2021-12-29 ENCOUNTER — PATIENT OUTREACH (OUTPATIENT)
Dept: SURGERY | Facility: CLINIC | Age: 41
End: 2021-12-29

## 2021-12-31 DIAGNOSIS — Z29.8 NEED FOR PNEUMOCYSTIS PROPHYLAXIS: ICD-10-CM

## 2022-01-03 RX ORDER — SULFAMETHOXAZOLE AND TRIMETHOPRIM 800; 160 MG/1; MG/1
TABLET ORAL
Qty: 30 TABLET | Refills: 5 | Status: SHIPPED | OUTPATIENT
Start: 2022-01-03 | End: 2022-07-12

## 2022-01-11 ENCOUNTER — APPOINTMENT (OUTPATIENT)
Dept: LAB | Facility: CLINIC | Age: 42
End: 2022-01-11
Payer: COMMERCIAL

## 2022-01-11 ENCOUNTER — OFFICE VISIT (OUTPATIENT)
Dept: SURGERY | Facility: CLINIC | Age: 42
End: 2022-01-11
Payer: COMMERCIAL

## 2022-01-11 VITALS
HEIGHT: 75 IN | BODY MASS INDEX: 23.28 KG/M2 | SYSTOLIC BLOOD PRESSURE: 119 MMHG | WEIGHT: 187.2 LBS | RESPIRATION RATE: 18 BRPM | HEART RATE: 64 BPM | DIASTOLIC BLOOD PRESSURE: 64 MMHG | TEMPERATURE: 95 F | OXYGEN SATURATION: 100 %

## 2022-01-11 DIAGNOSIS — Z13.6 ENCOUNTER FOR LIPID SCREENING FOR CARDIOVASCULAR DISEASE: ICD-10-CM

## 2022-01-11 DIAGNOSIS — Z11.3 SCREEN FOR STD (SEXUALLY TRANSMITTED DISEASE): ICD-10-CM

## 2022-01-11 DIAGNOSIS — Z13.220 ENCOUNTER FOR LIPID SCREENING FOR CARDIOVASCULAR DISEASE: ICD-10-CM

## 2022-01-11 DIAGNOSIS — S62.623G: ICD-10-CM

## 2022-01-11 DIAGNOSIS — R73.03 PREDIABETES: ICD-10-CM

## 2022-01-11 DIAGNOSIS — T65.222D TOXIC EFFECT OF TOBACCO CIGARETTE, INTENTIONAL SELF-HARM, SUBSEQUENT ENCOUNTER: ICD-10-CM

## 2022-01-11 DIAGNOSIS — Z20.2 CONTACT WITH AND (SUSPECTED) EXPOSURE TO INFECTIONS WITH A PREDOMINANTLY SEXUAL MODE OF TRANSMISSION: ICD-10-CM

## 2022-01-11 DIAGNOSIS — B20 SYMPTOMATIC HIV INFECTION (HCC): Primary | ICD-10-CM

## 2022-01-11 DIAGNOSIS — Z11.1 SCREENING FOR TUBERCULOSIS: ICD-10-CM

## 2022-01-11 DIAGNOSIS — Z91.89 ENCOUNTER FOR HEPATITIS C VIRUS SCREENING TEST FOR HIGH RISK PATIENT: ICD-10-CM

## 2022-01-11 DIAGNOSIS — S62.623A DISPLACED FRACTURE OF MIDDLE PHALANX OF LEFT MIDDLE FINGER, INITIAL ENCOUNTER FOR CLOSED FRACTURE: ICD-10-CM

## 2022-01-11 DIAGNOSIS — B20 SYMPTOMATIC HIV INFECTION (HCC): ICD-10-CM

## 2022-01-11 DIAGNOSIS — Z00.00 ANNUAL PHYSICAL EXAM: ICD-10-CM

## 2022-01-11 DIAGNOSIS — S63.233S: ICD-10-CM

## 2022-01-11 DIAGNOSIS — Z29.8 NEED FOR PNEUMOCYSTIS PROPHYLAXIS: ICD-10-CM

## 2022-01-11 DIAGNOSIS — Z11.59 ENCOUNTER FOR HEPATITIS C VIRUS SCREENING TEST FOR HIGH RISK PATIENT: ICD-10-CM

## 2022-01-11 DIAGNOSIS — Z23 NEED FOR INFLUENZA VACCINATION: ICD-10-CM

## 2022-01-11 LAB
ALBUMIN SERPL BCP-MCNC: 4.3 G/DL (ref 3.5–5)
ALP SERPL-CCNC: 94 U/L (ref 46–116)
ALT SERPL W P-5'-P-CCNC: 18 U/L (ref 12–78)
ANION GAP SERPL CALCULATED.3IONS-SCNC: 3 MMOL/L (ref 4–13)
AST SERPL W P-5'-P-CCNC: 17 U/L (ref 5–45)
BASOPHILS # BLD AUTO: 0.04 THOUSANDS/ΜL (ref 0–0.1)
BASOPHILS NFR BLD AUTO: 1 % (ref 0–1)
BILIRUB SERPL-MCNC: 0.62 MG/DL (ref 0.2–1)
BILIRUB UR QL STRIP: NEGATIVE
BUN SERPL-MCNC: 8 MG/DL (ref 5–25)
CALCIUM SERPL-MCNC: 9.6 MG/DL (ref 8.3–10.1)
CHLORIDE SERPL-SCNC: 107 MMOL/L (ref 100–108)
CHOLEST SERPL-MCNC: 180 MG/DL
CLARITY UR: CLEAR
CO2 SERPL-SCNC: 29 MMOL/L (ref 21–32)
COLOR UR: YELLOW
CREAT SERPL-MCNC: 1.26 MG/DL (ref 0.6–1.3)
EOSINOPHIL # BLD AUTO: 0.05 THOUSAND/ΜL (ref 0–0.61)
EOSINOPHIL NFR BLD AUTO: 1 % (ref 0–6)
ERYTHROCYTE [DISTWIDTH] IN BLOOD BY AUTOMATED COUNT: 13.2 % (ref 11.6–15.1)
EST. AVERAGE GLUCOSE BLD GHB EST-MCNC: 114 MG/DL
GFR SERPL CREATININE-BSD FRML MDRD: 70 ML/MIN/1.73SQ M
GLUCOSE P FAST SERPL-MCNC: 83 MG/DL (ref 65–99)
GLUCOSE UR STRIP-MCNC: NEGATIVE MG/DL
HBA1C MFR BLD: 5.6 %
HCT VFR BLD AUTO: 46.3 % (ref 36.5–49.3)
HCV AB SER QL: NORMAL
HDLC SERPL-MCNC: 49 MG/DL
HGB BLD-MCNC: 16.4 G/DL (ref 12–17)
HGB UR QL STRIP.AUTO: NEGATIVE
IMM GRANULOCYTES # BLD AUTO: 0.01 THOUSAND/UL (ref 0–0.2)
IMM GRANULOCYTES NFR BLD AUTO: 0 % (ref 0–2)
KETONES UR STRIP-MCNC: ABNORMAL MG/DL
LDLC SERPL CALC-MCNC: 115 MG/DL (ref 0–100)
LEUKOCYTE ESTERASE UR QL STRIP: NEGATIVE
LYMPHOCYTES # BLD AUTO: 1.11 THOUSANDS/ΜL (ref 0.6–4.47)
LYMPHOCYTES NFR BLD AUTO: 26 % (ref 14–44)
MCH RBC QN AUTO: 33.5 PG (ref 26.8–34.3)
MCHC RBC AUTO-ENTMCNC: 35.4 G/DL (ref 31.4–37.4)
MCV RBC AUTO: 95 FL (ref 82–98)
MONOCYTES # BLD AUTO: 0.4 THOUSAND/ΜL (ref 0.17–1.22)
MONOCYTES NFR BLD AUTO: 10 % (ref 4–12)
NEUTROPHILS # BLD AUTO: 2.61 THOUSANDS/ΜL (ref 1.85–7.62)
NEUTS SEG NFR BLD AUTO: 62 % (ref 43–75)
NITRITE UR QL STRIP: NEGATIVE
NONHDLC SERPL-MCNC: 131 MG/DL
NRBC BLD AUTO-RTO: 0 /100 WBCS
PH UR STRIP.AUTO: 6 [PH]
PLATELET # BLD AUTO: 195 THOUSANDS/UL (ref 149–390)
PMV BLD AUTO: 10.3 FL (ref 8.9–12.7)
POTASSIUM SERPL-SCNC: 3.7 MMOL/L (ref 3.5–5.3)
PROT SERPL-MCNC: 9.3 G/DL (ref 6.4–8.2)
PROT UR STRIP-MCNC: NEGATIVE MG/DL
RBC # BLD AUTO: 4.9 MILLION/UL (ref 3.88–5.62)
SODIUM SERPL-SCNC: 139 MMOL/L (ref 136–145)
SP GR UR STRIP.AUTO: >=1.03 (ref 1–1.03)
TRIGL SERPL-MCNC: 78 MG/DL
UROBILINOGEN UR QL STRIP.AUTO: 1 E.U./DL
WBC # BLD AUTO: 4.22 THOUSAND/UL (ref 4.31–10.16)

## 2022-01-11 PROCEDURE — 90471 IMMUNIZATION ADMIN: CPT

## 2022-01-11 PROCEDURE — 86360 T CELL ABSOLUTE COUNT/RATIO: CPT

## 2022-01-11 PROCEDURE — 80053 COMPREHEN METABOLIC PANEL: CPT

## 2022-01-11 PROCEDURE — 81003 URINALYSIS AUTO W/O SCOPE: CPT | Performed by: NURSE PRACTITIONER

## 2022-01-11 PROCEDURE — 86480 TB TEST CELL IMMUN MEASURE: CPT

## 2022-01-11 PROCEDURE — 87591 N.GONORRHOEAE DNA AMP PROB: CPT

## 2022-01-11 PROCEDURE — 86592 SYPHILIS TEST NON-TREP QUAL: CPT

## 2022-01-11 PROCEDURE — 36415 COLL VENOUS BLD VENIPUNCTURE: CPT

## 2022-01-11 PROCEDURE — 87491 CHLMYD TRACH DNA AMP PROBE: CPT

## 2022-01-11 PROCEDURE — 86803 HEPATITIS C AB TEST: CPT

## 2022-01-11 PROCEDURE — 83036 HEMOGLOBIN GLYCOSYLATED A1C: CPT

## 2022-01-11 PROCEDURE — 87536 HIV-1 QUANT&REVRSE TRNSCRPJ: CPT

## 2022-01-11 PROCEDURE — 80061 LIPID PANEL: CPT

## 2022-01-11 PROCEDURE — 90682 RIV4 VACC RECOMBINANT DNA IM: CPT

## 2022-01-11 PROCEDURE — 86593 SYPHILIS TEST NON-TREP QUANT: CPT

## 2022-01-11 PROCEDURE — 86780 TREPONEMA PALLIDUM: CPT

## 2022-01-11 PROCEDURE — 99396 PREV VISIT EST AGE 40-64: CPT | Performed by: NURSE PRACTITIONER

## 2022-01-11 PROCEDURE — 85025 COMPLETE CBC W/AUTO DIFF WBC: CPT

## 2022-01-11 RX ORDER — IBUPROFEN 600 MG/1
600 TABLET ORAL EVERY 6 HOURS PRN
Qty: 30 TABLET | Refills: 0 | Status: SHIPPED | OUTPATIENT
Start: 2022-01-11 | End: 2022-01-20

## 2022-01-11 NOTE — PATIENT INSTRUCTIONS
Wellness Visit for Adults   AMBULATORY CARE:   A wellness visit  is when you see your healthcare provider to get screened for health problems  Your healthcare provider will also give you advice on how to stay healthy  Write down your questions so you remember to ask them  Ask your healthcare provider how often you should have a wellness visit  What happens at a wellness visit:  Your healthcare provider will ask about your health, and your family history of health problems  This includes high blood pressure, heart disease, and cancer  He or she will ask if you have symptoms that concern you, if you smoke, and about your mood  You may also be asked about your intake of medicines, supplements, food, and alcohol  Any of the following may be done:  · Your weight  will be checked  Your height may also be checked so your body mass index (BMI) can be calculated  Your BMI shows if you are at a healthy weight  · Your blood pressure  and heart rate will be checked  Your temperature may also be checked  · Blood and urine tests  may be done  Blood tests may be done to check your cholesterol levels  Abnormal cholesterol levels increase your risk for heart disease and stroke  You may also need a blood or urine test to check for diabetes if you are at increased risk  Urine tests may be done to look for signs of an infection or kidney disease  · A physical exam  includes checking your heartbeat and lungs with a stethoscope  Your healthcare provider may also check your skin to look for sun damage  · Screening tests  may be recommended  A screening test is done to check for diseases that may not cause symptoms  The screening tests you may need depend on your age, gender, family history, and lifestyle habits  For example, colorectal screening may be recommended if you are 48years old or older  Screening tests you need if you are a woman:   · A Pap smear  is used to screen for cervical cancer   Pap smears are usually done every 3 to 5 years depending on your age  You may need them more often if you have had abnormal Pap smear test results in the past  Ask your healthcare provider how often you should have a Pap smear  · A mammogram  is an x-ray of your breasts to screen for breast cancer  Experts recommend mammograms every 2 years starting at age 48 years  You may need a mammogram at age 52 years or younger if you have an increased risk for breast cancer  Talk to your healthcare provider about when you should start having mammograms and how often you need them  Vaccines you may need:   · Get an influenza vaccine  every year  The influenza vaccine protects you from the flu  Several types of viruses cause the flu  The viruses change over time, so new vaccines are made each year  · Get a tetanus-diphtheria (Td) booster vaccine  every 10 years  This vaccine protects you against tetanus and diphtheria  Tetanus is a severe infection that may cause painful muscle spasms and lockjaw  Diphtheria is a severe bacterial infection that causes a thick covering in the back of your mouth and throat  · Get a human papillomavirus (HPV) vaccine  if you are female and aged 23 to 32 or male 23 to 24 and never received it  This vaccine protects you from HPV infection  HPV is the most common infection spread by sexual contact  HPV may also cause vaginal, penile, and anal cancers  · Get a pneumococcal vaccine  if you are aged 72 years or older  The pneumococcal vaccine is an injection given to protect you from pneumococcal disease  Pneumococcal disease is an infection caused by pneumococcal bacteria  The infection may cause pneumonia, meningitis, or an ear infection  · Get a shingles vaccine  if you are 60 or older, even if you have had shingles before  The shingles vaccine is an injection to protect you from the varicella-zoster virus  This is the same virus that causes chickenpox   Shingles is a painful rash that develops in people who had chickenpox or have been exposed to the virus  How to eat healthy:  My Plate is a model for planning healthy meals  It shows the types and amounts of foods that should go on your plate  Fruits and vegetables make up about half of your plate, and grains and protein make up the other half  A serving of dairy is included on the side of your plate  The amount of calories and serving sizes you need depends on your age, gender, weight, and height  Examples of healthy foods are listed below:  · Eat a variety of vegetables  such as dark green, red, and orange vegetables  You can also include canned vegetables low in sodium (salt) and frozen vegetables without added butter or sauces  · Eat a variety of fresh fruits , canned fruit in 100% juice, frozen fruit, and dried fruit  · Include whole grains  At least half of the grains you eat should be whole grains  Examples include whole-wheat bread, wheat pasta, brown rice, and whole-grain cereals such as oatmeal     · Eat a variety of protein foods such as seafood (fish and shellfish), lean meat, and poultry without skin (turkey and chicken)  Examples of lean meats include pork leg, shoulder, or tenderloin, and beef round, sirloin, tenderloin, and extra lean ground beef  Other protein foods include eggs and egg substitutes, beans, peas, soy products, nuts, and seeds  · Choose low-fat dairy products such as skim or 1% milk or low-fat yogurt, cheese, and cottage cheese  · Limit unhealthy fats  such as butter, hard margarine, and shortening  Exercise:  Exercise at least 30 minutes per day on most days of the week  Some examples of exercise include walking, biking, dancing, and swimming  You can also fit in more physical activity by taking the stairs instead of the elevator or parking farther away from stores  Include muscle strengthening activities 2 days each week  Regular exercise provides many health benefits   It helps you manage your weight, and decreases your risk for type 2 diabetes, heart disease, stroke, and high blood pressure  Exercise can also help improve your mood  Ask your healthcare provider about the best exercise plan for you  General health and safety guidelines:   · Do not smoke  Nicotine and other chemicals in cigarettes and cigars can cause lung damage  Ask your healthcare provider for information if you currently smoke and need help to quit  E-cigarettes or smokeless tobacco still contain nicotine  Talk to your healthcare provider before you use these products  · Limit alcohol  A drink of alcohol is 12 ounces of beer, 5 ounces of wine, or 1½ ounces of liquor  · Lose weight, if needed  Being overweight increases your risk of certain health conditions  These include heart disease, high blood pressure, type 2 diabetes, and certain types of cancer  · Protect your skin  Do not sunbathe or use tanning beds  Use sunscreen with a SPF 15 or higher  Apply sunscreen at least 15 minutes before you go outside  Reapply sunscreen every 2 hours  Wear protective clothing, hats, and sunglasses when you are outside  · Drive safely  Always wear your seatbelt  Make sure everyone in your car wears a seatbelt  A seatbelt can save your life if you are in an accident  Do not use your cell phone when you are driving  This could distract you and cause an accident  Pull over if you need to make a call or send a text message  · Practice safe sex  Use latex condoms if are sexually active and have more than one partner  Your healthcare provider may recommend screening tests for sexually transmitted infections (STIs)  · Wear helmets, lifejackets, and protective gear  Always wear a helmet when you ride a bike or motorcycle, go skiing, or play sports that could cause a head injury  Wear protective equipment when you play sports  Wear a lifejacket when you are on a boat or doing water sports      © Copyright [employer] 2021 Information is for End User's use only and may not be sold, redistributed or otherwise used for commercial purposes  All illustrations and images included in CareNotes® are the copyrighted property of A D A M , Inc  or Iliana Bailey  The above information is an  only  It is not intended as medical advice for individual conditions or treatments  Talk to your doctor, nurse or pharmacist before following any medical regimen to see if it is safe and effective for you  Cigarette Smoking and Your Health   AMBULATORY CARE:   Risks to your health if you smoke:  Nicotine and other chemicals found in tobacco and e-cigarettes can damage every cell in your body  Even if you are a light smoker, you have an increased risk for cancer, heart disease, and lung disease  If you are pregnant or have diabetes, smoking increases your risk for complications  Nicotine can affect an adolescent's developing brain  This can lead to trouble thinking, learning, or paying attention  Benefits to your health if you stop smoking:   · You decrease respiratory symptoms such as coughing, wheezing, and shortness of breath  · You reduce your risk for cancers of the lung, mouth, throat, kidney, bladder, pancreas, stomach, and cervix  If you already have cancer, you increase the benefits of chemotherapy  You also reduce your risk for cancer returning or a second cancer from developing  · You reduce your risk for heart disease, blood clots, heart attack, and stroke  · You reduce your risk for lung infections, and diseases such as pneumonia, asthma, chronic bronchitis, and emphysema  · Your circulation improves  More oxygen can be delivered to your body  If you have diabetes, you lower your risk for complications, such as kidney, artery, and eye diseases  You also lower your risk for nerve damage  Nerve damage can lead to amputations, poor vision, and blindness      · You improve your body's ability to heal and to fight infections  · An adolescent can help his or her brain and body develop in a healthy way  Talk to your adolescent about all the health risks of nicotine  If you can, start talking about nicotine when your child is younger than 12 years  This may make it easier for him or her not to start using nicotine as a teenager or adult  Explain to him or her that it is best never to start  It can be hard to try to quit later  Benefits to the health of others if you stop smoking:  Tobacco is harmful to nonsmokers who breathe in your secondhand smoke  The following are ways the health of others around you may improve when you stop smoking:  · You lower the risks for lung cancer and heart disease in nonsmoking adults  · If you are pregnant, you lower the risk for miscarriage, early delivery, low birth weight, and stillbirth  You also lower your baby's risk for SIDS, obesity, developmental delay, and neurobehavioral problems, such as ADHD  · If you have children, you lower their risk for ear infections, colds, pneumonia, bronchitis, and asthma  Follow up with your doctor as directed:  Write down your questions so you remember to ask them during your visits  For support and more information:   · American [employer] Association  [address] [facility],[address]  [address]  Phone: [facility]  Phone: 9- 752 - [phone]  Web Address: [place]    · Smokefree  gov  Phone: 2- 491 - 982-3128  Web Address: www smokefree  [facility] 2021 Information is for End User's use only and may not be sold, redistributed or otherwise used for commercial purposes  All illustrations and images included in CareNotes® are the copyrighted property of A D A [employer]  or [address] [place], [place] [address]alonzo   The above information is an  only  It is not intended as medical advice for individual conditions or treatments   Talk to your doctor, nurse or pharmacist before following any medical regimen to see if it is safe and effective for you  Cholesterol and Your Health   AMBULATORY CARE:   Cholesterol  is a waxy, fat-like substance  Your body uses cholesterol to make hormones and new cells, and to protect nerves  Cholesterol is made by your body  It also comes from certain foods you eat, such as meat and dairy products  Your healthcare provider can help you set goals for your cholesterol levels  He or she can help you create a plan to meet your goals  Cholesterol level goals: Your cholesterol level goals depend on your risk for heart disease, your age, and your other health conditions  The following are general guidelines:  · Total cholesterol  includes low-density lipoprotein (LDL), high-density lipoprotein (HDL), and triglyceride levels  The total cholesterol level should be lower than 200 mg/dL and is best at about 150 mg/dL  · LDL cholesterol  is called bad cholesterol  because it forms plaque in your arteries  As plaque builds up, your arteries become narrow, and less blood flows through  When plaque decreases blood flow to your heart, you may have chest pain  If plaque completely blocks an artery that brings blood to your heart, you may have a heart attack  Plaque can break off and form blood clots  Blood clots may block arteries in your brain and cause a stroke  The level should be less than 130 mg/dL and is best at about 100 mg/dL  · HDL cholesterol  is called good cholesterol  because it helps remove LDL cholesterol from your arteries  It does this by attaching to LDL cholesterol and carrying it to your liver  Your liver breaks down LDL cholesterol so your body can get rid of it  High levels of HDL cholesterol can help prevent a heart attack and stroke  Low levels of HDL cholesterol can increase your risk for heart disease, heart attack, and stroke  The level should be 60 mg/dL or higher  · Triglycerides  are a type of fat that store energy from foods you eat   High levels of triglycerides also cause plaque buildup  This can increase your risk for a heart attack or stroke  If your triglyceride level is high, your LDL cholesterol level may also be high  The level should be less than 150 mg/dL  Any of the following can increase your risk for high cholesterol:   · Smoking cigarettes    · Being overweight or obese, or not getting enough exercise    · Drinking large amounts of alcohol    · A medical condition such as hypertension (high blood pressure) or diabetes    · Certain genes passed from your parents to you    · Age older than 65 years    What you need to know about having your cholesterol levels checked: Adults 21to 39years of age should have their cholesterol levels checked every 4 to 6 years  Adults 45 years or older should have their cholesterol checked every 1 to 2 years  You may need your cholesterol checked more often, or at a younger age, if you have risk factors for heart disease  You may also need to have your cholesterol checked more often if you have other health conditions, such as diabetes  Blood tests are used to check cholesterol levels  Blood tests measure your levels of triglycerides, LDL cholesterol, and HDL cholesterol  How healthy fats affect your cholesterol levels:  Healthy fats, also called unsaturated fats, help lower LDL cholesterol and triglyceride levels  Healthy fats include the following:  · Monounsaturated fats  are found in foods such as olive oil, canola oil, avocado, nuts, and olives  · Polyunsaturated fats,  such as omega 3 fats, are found in fish, such as salmon, trout, and tuna  They can also be found in plant foods such as flaxseed, walnuts, and soybeans  How unhealthy fats affect your cholesterol levels:  Unhealthy fats increase LDL cholesterol and triglyceride levels  They are found in foods high in cholesterol, saturated fat, and trans fat:  · Cholesterol  is found in eggs, dairy, and meat      · Saturated fat  is found in butter, cheese, ice cream, whole milk, and coconut oil  Saturated fat is also found in meat, such as sausage, hot dogs, and bologna  · Trans fat  is found in liquid oils and is used in fried and baked foods  Foods that contain trans fats include chips, crackers, muffins, sweet rolls, microwave popcorn, and cookies  Treatment  for high cholesterol will also decrease your risk of heart disease, heart attack, and stroke  Treatment may include any of the following:  · Lifestyle changes  may include food, exercise, weight loss, and quitting smoking  You may also need to decrease the amount of alcohol you drink  Your healthcare provider will want you to start with lifestyle changes  Other treatment may be added if lifestyle changes are not enough  Your healthcare provider may recommend you work with a team to manage hyperlipidemia  The team may include medical experts such as a dietitian, an exercise or physical therapist, and a behavior therapist  Your family members may be included in helping you create lifestyle changes  · Medicines  may be given to lower your LDL cholesterol, triglyceride levels, or total cholesterol level  You may need medicines to lower your cholesterol if any of the following is true:    ? You have a history of stroke, TIA, unstable angina, or a heart attack  ? Your LDL cholesterol level is 190 mg/dL or higher  ? You are age 36 to 76 years, have diabetes or heart disease risk factors, and your LDL cholesterol is 70 mg/dL or higher  · Supplements  include fish oil, red yeast rice, and garlic  Fish oil may help lower your triglyceride and LDL cholesterol levels  It may also increase your HDL cholesterol level  Red yeast rice may help decrease your total cholesterol level and LDL cholesterol level  Garlic may help lower your total cholesterol level  Do not take any supplements without talking to your healthcare provider      Food changes you can make to lower your cholesterol levels:  A dietitian can help you create a healthy eating plan  He or she can show you how to read food labels and choose foods low in saturated fat, trans fats, and cholesterol  · Decrease the total amount of fat you eat  Choose lean meats, fat-free or 1% fat milk, and low-fat dairy products, such as yogurt and cheese  Try to limit or avoid red meats  Limit or do not eat fried foods or baked goods, such as cookies  · Replace unhealthy fats with healthy fats  Cook foods in olive oil or canola oil  Choose soft margarines that are low in saturated fat and trans fat  Seeds, nuts, and avocados are other examples of healthy fats  · Eat foods with omega-3 fats  Examples include salmon, tuna, mackerel, walnuts, and flaxseed  Eat fish 2 times per week  Pregnant women should not eat fish that have high levels of mercury, such as shark, swordfish, and johnathan mackerel  · Increase the amount of high-fiber foods you eat  High-fiber foods can help lower your LDL cholesterol  Aim to get between 20 and 30 grams of fiber each day  Fruits and vegetables are high in fiber  Eat at least 5 servings each day  Other high-fiber foods are whole-grain or whole-wheat breads, pastas, or cereals, and brown rice  Eat 3 ounces of whole-grain foods each day  Increase fiber slowly  You may have abdominal discomfort, bloating, and gas if you add fiber to your diet too quickly  · Eat healthy protein foods  Examples include low-fat dairy products, skinless chicken and turkey, fish, and nuts  · Limit foods and drinks that are high in sugar  Your dietitian or healthcare provider can help you create daily limits for high-sugar foods and drinks  The limit may be lower if you have diabetes or another health condition  Limits can also help you lose weight if needed  Lifestyle changes you can make to lower your cholesterol levels:   · Maintain a healthy weight  Ask your healthcare provider what a healthy weight is for you   Ask him or her to help you create a weight loss plan if needed  Weight loss can decrease your total cholesterol and triglyceride levels  Weight loss may also help keep your blood pressure at a healthy level  · Be physically active throughout the day  Physical activity, such as exercise, can help lower your total cholesterol level and maintain a healthy weight  Physical activity can also help increase your HDL cholesterol level  Work with your healthcare provider to create an program that is right for you  Get at least 30 to 40 minutes of moderate physical activity most days of the week  Examples of exercise include brisk walking, swimming, or biking  Also include strength training at least 2 times each week  Your healthcare providers can help you create a physical activity plan  · Do not smoke  Nicotine and other chemicals in cigarettes and cigars can raise your cholesterol levels  Ask your healthcare provider for information if you currently smoke and need help to quit  E-cigarettes or smokeless tobacco still contain nicotine  Talk to your healthcare provider before you use these products  · Limit or do not drink alcohol  Alcohol can increase your triglyceride levels  Ask your healthcare provider before you drink alcohol  Ask how much is okay for you to drink in 24 hours or 1 week  Follow up with your doctor as directed:  Write down your questions so you remember to ask them during your visits  © [employer] 2021 Information is for End User's use only and may not be sold, redistributed or otherwise used for commercial purposes  All illustrations and images included in CareNotes® are the copyrighted property of A D A [employer]  or Iliana Bailey  The above information is an  only  It is not intended as medical advice for individual conditions or treatments  Talk to your doctor, nurse or pharmacist before following any medical regimen to see if it is safe and effective for you      Wellness Visit for Adults   AMBULATORY CARE:   A wellness visit  is when you see your healthcare provider to get screened for health problems  Your healthcare provider will also give you advice on how to stay healthy  Write down your questions so you remember to ask them  Ask your healthcare provider how often you should have a wellness visit  What happens at a wellness visit:  Your healthcare provider will ask about your health, and your family history of health problems  This includes high blood pressure, heart disease, and cancer  He or she will ask if you have symptoms that concern you, if you smoke, and about your mood  You may also be asked about your intake of medicines, supplements, food, and alcohol  Any of the following may be done:  · Your weight  will be checked  Your height may also be checked so your body mass index (BMI) can be calculated  Your BMI shows if you are at a healthy weight  · Your blood pressure  and heart rate will be checked  Your temperature may also be checked  · Blood and urine tests  may be done  Blood tests may be done to check your cholesterol levels  Abnormal cholesterol levels increase your risk for heart disease and stroke  You may also need a blood or urine test to check for diabetes if you are at increased risk  Urine tests may be done to look for signs of an infection or kidney disease  · A physical exam  includes checking your heartbeat and lungs with a stethoscope  Your healthcare provider may also check your skin to look for sun damage  · Screening tests  may be recommended  A screening test is done to check for diseases that may not cause symptoms  The screening tests you may need depend on your age, gender, family history, and lifestyle habits  For example, colorectal screening may be recommended if you are 48years old or older  Screening tests you need if you are a woman:   · A Pap smear  is used to screen for cervical cancer   Pap smears are usually done every 3 to 5 years depending on your age  You may need them more often if you have had abnormal Pap smear test results in the past  Ask your healthcare provider how often you should have a Pap smear  · A mammogram  is an x-ray of your breasts to screen for breast cancer  Experts recommend mammograms every 2 years starting at age 48 years  You may need a mammogram at age 52 years or younger if you have an increased risk for breast cancer  Talk to your healthcare provider about when you should start having mammograms and how often you need them  Vaccines you may need:   · Get an influenza vaccine  every year  The influenza vaccine protects you from the flu  Several types of viruses cause the flu  The viruses change over time, so new vaccines are made each year  · Get a tetanus-diphtheria (Td) booster vaccine  every 10 years  This vaccine protects you against tetanus and diphtheria  Tetanus is a severe infection that may cause painful muscle spasms and lockjaw  Diphtheria is a severe bacterial infection that causes a thick covering in the back of your mouth and throat  · Get a human papillomavirus (HPV) vaccine  if you are female and aged 23 to 32 or male 23 to 24 and never received it  This vaccine protects you from HPV infection  HPV is the most common infection spread by sexual contact  HPV may also cause vaginal, penile, and anal cancers  · Get a pneumococcal vaccine  if you are aged 72 years or older  The pneumococcal vaccine is an injection given to protect you from pneumococcal disease  Pneumococcal disease is an infection caused by pneumococcal bacteria  The infection may cause pneumonia, meningitis, or an ear infection  · Get a shingles vaccine  if you are 60 or older, even if you have had shingles before  The shingles vaccine is an injection to protect you from the varicella-zoster virus  This is the same virus that causes chickenpox   Shingles is a painful rash that develops in people who had chickenpox or have been exposed to the virus  How to eat healthy:  My Plate is a model for planning healthy meals  It shows the types and amounts of foods that should go on your plate  Fruits and vegetables make up about half of your plate, and grains and protein make up the other half  A serving of dairy is included on the side of your plate  The amount of calories and serving sizes you need depends on your age, gender, weight, and height  Examples of healthy foods are listed below:  · Eat a variety of vegetables  such as dark green, red, and orange vegetables  You can also include canned vegetables low in sodium (salt) and frozen vegetables without added butter or sauces  · Eat a variety of fresh fruits , canned fruit in 100% juice, frozen fruit, and dried fruit  · Include whole grains  At least half of the grains you eat should be whole grains  Examples include whole-wheat bread, wheat pasta, brown rice, and whole-grain cereals such as oatmeal     · Eat a variety of protein foods such as seafood (fish and shellfish), lean meat, and poultry without skin (turkey and chicken)  Examples of lean meats include pork leg, shoulder, or tenderloin, and beef round, sirloin, tenderloin, and extra lean ground beef  Other protein foods include eggs and egg substitutes, beans, peas, soy products, nuts, and seeds  · Choose low-fat dairy products such as skim or 1% milk or low-fat yogurt, cheese, and cottage cheese  · Limit unhealthy fats  such as butter, hard margarine, and shortening  Exercise:  Exercise at least 30 minutes per day on most days of the week  Some examples of exercise include walking, biking, dancing, and swimming  You can also fit in more physical activity by taking the stairs instead of the elevator or parking farther away from stores  Include muscle strengthening activities 2 days each week  Regular exercise provides many health benefits   It helps you manage your weight, and decreases your risk for type 2 diabetes, heart disease, stroke, and high blood pressure  Exercise can also help improve your mood  Ask your healthcare provider about the best exercise plan for you  General health and safety guidelines:   · Do not smoke  Nicotine and other chemicals in cigarettes and cigars can cause lung damage  Ask your healthcare provider for information if you currently smoke and need help to quit  E-cigarettes or smokeless tobacco still contain nicotine  Talk to your healthcare provider before you use these products  · Limit alcohol  A drink of alcohol is 12 ounces of beer, 5 ounces of wine, or 1½ ounces of liquor  · Lose weight, if needed  Being overweight increases your risk of certain health conditions  These include heart disease, high blood pressure, type 2 diabetes, and certain types of cancer  · Protect your skin  Do not sunbathe or use tanning beds  Use sunscreen with a SPF 15 or higher  Apply sunscreen at least 15 minutes before you go outside  Reapply sunscreen every 2 hours  Wear protective clothing, hats, and sunglasses when you are outside  · Drive safely  Always wear your seatbelt  Make sure everyone in your car wears a seatbelt  A seatbelt can save your life if you are in an accident  Do not use your cell phone when you are driving  This could distract you and cause an accident  Pull over if you need to make a call or send a text message  · Practice safe sex  Use latex condoms if are sexually active and have more than one partner  Your healthcare provider may recommend screening tests for sexually transmitted infections (STIs)  · Wear helmets, lifejackets, and protective gear  Always wear a helmet when you ride a bike or motorcycle, go skiing, or play sports that could cause a head injury  Wear protective equipment when you play sports  Wear a lifejacket when you are on a boat or doing water sports      © Copyright [employer] 2021 Information is for End User's use only and may not be sold, redistributed or otherwise used for commercial purposes  All illustrations and images included in CareNotes® are the copyrighted property of A D A M , Inc  or Iliana Bailey  The above information is an  only  It is not intended as medical advice for individual conditions or treatments  Talk to your doctor, nurse or pharmacist before following any medical regimen to see if it is safe and effective for you

## 2022-01-11 NOTE — ASSESSMENT & PLAN NOTE
Doing ok  Pt reports he has not been compliant  · Stressed the importance of 100% medication adherence  · Labs were drawn today    HIV Counseling:    Viral Load: 470,000 copies    CD4 Count: 21    ART: [address]   Denies side effects  Stressed the importance of adherence  Continue follow up in the ID clinic with Dr Dmitri Plunkett  Reviewed the most recent labs, including the CD4 and viral load  Discussed the risks and benefits of treatment options, instructions for management, importance of treatment adherence, and reduction of risk factors  Educated on possible medication side effects  Counseled on routes of HIV transmission, including the risk of  infection  Emphasized that viral suppression is the best method to prevent HIV transmission  At this time the pt denies the need for HIV testing of anyone in their life  Total encounter time was greater than 35 minutes  Greater than 20 minutes were spent on counseling and patient education  Pt voices understanding and agreement with treatment plan

## 2022-01-11 NOTE — PROGRESS NOTES
[address] [facility] [address]    NAME: Ton Lloyd  AGE: 37 y o  SEX: male  : 1980     DATE: 2022     Assessment and Plan:     Problem List Items Addressed This Visit        Musculoskeletal and Integument    Sublux of proximal interphaln joint of l mid finger, sequela       Pt was not wearing splint  Pt able to move and bend middle finger without difficult  No pain during palpation of finger  · [facility] rescheduled appt with orthopedic for pt  · Reorder ibuprofen  · Reviewed xrays  · Reviewed ER note and return precautions         Relevant Orders    Ambulatory referral to Orthopedic Surgery       Other    [facility] - Primary     Doing ok  Pt reports he has not been compliant  · Stressed the importance of 100% medication adherence  · Labs were drawn today    HIV Counseling:    Viral Load: 470,000 copies    CD4 Count: 21    ART: [address]   Denies side effects  Stressed the importance of adherence  Continue follow up in the ID clinic with Dr Jermaine Salinas  Reviewed the most recent labs, including the CD4 and viral load  Discussed the risks and benefits of treatment options, instructions for management, importance of treatment adherence, and reduction of risk factors  Educated on possible medication side effects  Counseled on routes of HIV transmission, including the risk of  infection  Emphasized that viral suppression is the best method to prevent HIV transmission  At this time the pt denies the need for HIV testing of anyone in their life  Total encounter time was greater than 35 minutes  Greater than 20 minutes were spent on counseling and patient education  Pt voices understanding and agreement with treatment plan               Relevant Orders    CBC and differential (Completed)    Comprehensive metabolic panel (Completed)    HIV-1 RNA, quantitative, PCR    T-helper cells CD4/CD8 % (Completed)    RPR (Completed)    Chlamydia/GC amplified DNA by PCR (Completed)    UA (URINE) with reflex to Scope (Completed)    Toxic effect of tobacco cigarette     Pt is smoking 1 pack per day  · Stress complete smoking cessation  · Per pt nicotine patches caused rash  Pt loves to smoke a night  Nicotine Dependency - Primary    Counseled for greater than 15 minutes on the importance of smoking cessation  Education was given regarding the cardiovascular effects of how nicotine affects the heart, lungs, kidneys, and peripheral vascular system    Referred to [facility] [address] [place] [address] for enrollment in smoking cessation program            Need for pneumocystis prophylaxis     · Continue Bactrim until lab results come back         Prediabetes    Relevant Orders    HEMOGLOBIN A1C W/ EAG ESTIMATION (Completed)      Other Visit Diagnoses     Encounter for lipid screening for cardiovascular disease        Relevant Orders    Lipid panel (Completed)    Encounter for hepatitis C virus screening test for high risk patient        Relevant Orders    Hepatitis C antibody (Completed)    Screening for tuberculosis        Relevant Orders    Quantiferon TB Gold Plus (Completed)    Contact with and (suspected) exposure to infections with a predominantly sexual mode of transmission        Relevant Orders    RPR (Completed)    Chlamydia/GC amplified DNA by PCR (Completed)    Screen for STD (sexually transmitted disease)        Relevant Orders    RPR (Completed)    Chlamydia/GC amplified DNA by PCR (Completed)    Need for influenza vaccination        Relevant Orders    influenza vaccine, quadrivalent, recombinant, PF, 0 5 mL, for patients 18 yr+ (FLUBLOK) (Completed)    Annual physical exam        Closed displaced fracture of middle phalanx of left middle finger with delayed healing, subsequent encounter        Relevant Orders    Ambulatory referral to Orthopedic Surgery    Displaced fracture of middle phalanx of left middle finger, initial encounter for closed fracture        Relevant Medications    ibuprofen (MOTRIN) 600 mg tablet          Immunizations and preventive care screenings were discussed with patient today  Appropriate education was printed on patient's after visit summary  Counseling:  Injury prevention: discussed safety/seat belts, safety helmets, smoke detectors, carbon dioxide detectors, and smoking near bedding or upholstery  · Sexual health: discussed sexually transmitted diseases, partner selection, use of condoms, avoidance of unintended pregnancy, and contraceptive alternatives  Return in 3 months (on 4/11/2022)  Chief Complaint:   Pt presents today for annual physical and management of chronic health care conditions  PMH: HIV with poor medical  adherence, depression, anxiety, mental impairment, smoking, and syphilis  Pt has not completed follow up HIV labs due last year  Last VL from 6/2021 shows and VL of 470,000 copies  Pt did receive all 3 bicllin 9/2021  Pt reports he is doing ok just has left middle finger pain from broken finger on 12/11/21  Pt reports he went to see Dr Debra Osuna and waited 4 hours and then was told he did not have an appt today  Pt was not given another appt  Pt reports the ibuprofen 600 mg every 6 without any relief in pain  Per pt is was given a nerve block to put finger back into place but did work  Pt would like to have his finger fixed  Pt mentioned getting a   Reviewed ER report and xray results with pt  Per pt finger is swollen and difficult to bend and does experience pain  Pt will receive flu vaccine today  Pt denies being sexually active  Chief Complaint   Patient presents with    annual physical      History of Present Illness:     Adult Annual Physical   Patient here for a comprehensive physical exam  The patient reports no problems      Diet and Physical Activity  · Diet/Nutrition: well balanced diet, consuming 3-5 servings of fruits/vegetables daily and adequate fiber intake  · Exercise: 5-7 times a week on average and 1-2 hours on average  Depression Screening  PHQ-2/9 Depression Screening         General Health  · Sleep: gets 7-8 hours of sleep on average  · Hearing: normal - bilateral   · Vision: no vision problems and most recent eye exam >1 year ago  · Dental: no dental visits for >1 year, brushes teeth three times daily and flosses teeth occasionally   Health  · Symptoms include: none     Review of Systems:     Review of Systems   Constitutional: Negative  HENT: Negative  Respiratory: Negative  Cardiovascular: Negative  Genitourinary: Negative  Musculoskeletal: Positive for joint swelling  Skin: Positive for wound  Neurological: Negative  Psychiatric/Behavioral: Negative  Past Medical History:     No past medical history on file  Past Surgical History:     No past surgical history on file     Family History:     Family History   Problem Relation Age of Onset    Depression Mother       Social History:     Social History     Socioeconomic History    Marital status: Single     Spouse name: None    Number of children: None    Years of education: None    Highest education level: None   Occupational History    None   Tobacco Use    Smoking status: Heavy Tobacco Smoker     Packs/day: 1 00     Years: 14 00     Pack years: 14 00     Types: Cigarettes    Smokeless tobacco: Current User     Types: Chew    Tobacco comment: 1 pack 2-3 day   Substance and Sexual Activity    Alcohol use: Not Currently    Drug use: Not Currently     Frequency: 1 0 times per week     Types: Marijuana     Comment: every 2 days    Sexual activity: Not Currently   Other Topics Concern    None   Social History Narrative    None     Social Determinants of Health     Financial Resource Strain: Not on file   Food Insecurity: Not on file   Transportation Needs: Not on file   Physical Activity: Not on file   Stress: Not on file   Social Connections: Not on file   Intimate Partner Violence: Not on file   Housing Stability: Not on file      Current Medications:     Current Outpatient Medications   Medication Sig Dispense Refill    albuterol (Ventolin HFA) 90 mcg/act inhaler Inhale 2 puffs every 6 (six) hours as needed for wheezing 18 g 2    Cholecalciferol 125 MCG (5000 UT) capsule Take 1 capsule by mouth      Descovy 200-25 MG tablet TAKE 1 TABLET BY MOUTH IN THE MORNING 30 tablet 1    Nutritional Supplements (Ensure) Take 250 mL by mouth daily 7500 mL 1    sulfamethoxazole-trimethoprim (BACTRIM DS) 800-160 mg per tablet TAKE 1 TABLET BY MOUTH IN THE MORNING 30 tablet 5    Tivicay 50 MG TABS TAKE 1 TABLET BY MOUTH IN THE MORNING 30 tablet 1    acetaminophen (TYLENOL) 650 mg CR tablet Take 1 tablet (650 mg total) by mouth every 8 (eight) hours as needed for mild pain (Patient not taking: Reported on 1/11/2022 ) 30 tablet 0    ibuprofen (MOTRIN) 600 mg tablet Take 1 tablet (600 mg total) by mouth every 6 (six) hours as needed for mild pain 30 tablet 0     No current facility-administered medications for this visit  Allergies: Allergies   Allergen Reactions    Nicotine Rash      Physical Exam:     /64 (BP Location: Right arm, Patient Position: Sitting, Cuff Size: Standard)   Pulse 64   Temp (!) 95 °F (35 °C)   Resp 18   Ht 6' 3" (1 905 m)   Wt 84 9 kg (187 lb 3 2 oz)   SpO2 100%   BMI 23 40 kg/m²     Physical Exam  Vitals and nursing note reviewed  Constitutional:       General: He is not in acute distress  Appearance: Normal appearance  He is not ill-appearing  HENT:      Head: Normocephalic  Right Ear: Tympanic membrane normal       Left Ear: Tympanic membrane normal       Nose: Nose normal       Mouth/Throat:      Mouth: Mucous membranes are moist       Pharynx: Oropharynx is clear  Eyes:      Extraocular Movements: Extraocular movements intact        Pupils: Pupils are equal, round, and reactive to light  Cardiovascular:      Rate and Rhythm: Normal rate and regular rhythm  Chest Wall: PMI is not displaced  Pulses: Normal pulses  Heart sounds: Normal heart sounds  Pulmonary:      Effort: Pulmonary effort is normal       Breath sounds: Normal breath sounds  Abdominal:      General: Bowel sounds are normal       Palpations: Abdomen is soft  Musculoskeletal:         General: Swelling, deformity and signs of injury present  Left hand: Swelling and deformity present  No tenderness  Normal range of motion  Normal strength  Normal sensation  Normal capillary refill  Normal pulse  Comments: Left middle finger  Lymphadenopathy:      Cervical: No cervical adenopathy  Skin:     General: Skin is warm and dry  Capillary Refill: Capillary refill takes less than 2 seconds  Neurological:      Mental Status: He is alert and oriented to person, place, and time  Comments: Neuro at baseline  Psychiatric:         Mood and Affect: Mood normal          Behavior: Behavior normal          Thought Content:  Thought content normal          Judgment: Judgment normal           ANNA Godfrey  [facility] AT [address] [place] [address]

## 2022-01-11 NOTE — ASSESSMENT & PLAN NOTE
Pt was not wearing splint  Pt able to move and bend middle finger without difficult  No pain during palpation of finger     · [facility] rescheduled appt with orthopedic for pt  · Reorder ibuprofen  · Reviewed xrays  · Reviewed ER note and return precautions

## 2022-01-11 NOTE — ASSESSMENT & PLAN NOTE
Pt is smoking 1 pack per day  · Stress complete smoking cessation  · Per pt nicotine patches caused rash  Pt loves to smoke a night  Nicotine Dependency - Primary    Counseled for greater than 15 minutes on the importance of smoking cessation  Education was given regarding the cardiovascular effects of how nicotine affects the heart, lungs, kidneys, and peripheral vascular system    Referred to [facility] [address] [place] [address] for enrollment in smoking cessation program

## 2022-01-12 LAB
BASOPHILS # BLD AUTO: 0 X10E3/UL (ref 0–0.2)
BASOPHILS NFR BLD AUTO: 1 %
CD3+CD4+ CELLS # BLD: 58 /UL (ref 359–1519)
CD3+CD4+ CELLS NFR BLD: 5.3 % (ref 30.8–58.5)
CD3+CD4+ CELLS/CD3+CD8+ CLL BLD: 0.08 % (ref 0.92–3.72)
CD3+CD8+ CELLS # BLD: 696 /UL (ref 109–897)
CD3+CD8+ CELLS NFR BLD: 63.3 % (ref 12–35.5)
EOSINOPHIL # BLD AUTO: 0.1 X10E3/UL (ref 0–0.4)
EOSINOPHIL NFR BLD AUTO: 2 %
ERYTHROCYTE [DISTWIDTH] IN BLOOD BY AUTOMATED COUNT: 13.7 % (ref 11.6–15.4)
GAMMA INTERFERON BACKGROUND BLD IA-ACNC: 0.03 IU/ML
HCT VFR BLD AUTO: 45.6 % (ref 37.5–51)
HGB BLD-MCNC: 15.2 G/DL (ref 13–17.7)
HIV1 RNA # SERPL NAA+PROBE: <20 COPIES/ML
HIV1 RNA SERPL NAA+PROBE-LOG#: NORMAL LOG10COPY/ML
IMM GRANULOCYTES # BLD: 0 X10E3/UL (ref 0–0.1)
IMM GRANULOCYTES NFR BLD: 0 %
LYMPHOCYTES # BLD AUTO: 1.1 X10E3/UL (ref 0.7–3.1)
LYMPHOCYTES NFR BLD AUTO: 26 %
M TB IFN-G BLD-IMP: NEGATIVE
M TB IFN-G CD4+ BCKGRND COR BLD-ACNC: 0 IU/ML
M TB IFN-G CD4+ BCKGRND COR BLD-ACNC: 0 IU/ML
MCH RBC QN AUTO: 32.6 PG (ref 26.6–33)
MCHC RBC AUTO-ENTMCNC: 33.3 G/DL (ref 31.5–35.7)
MCV RBC AUTO: 98 FL (ref 79–97)
MITOGEN IGNF BCKGRD COR BLD-ACNC: 8.53 IU/ML
MONOCYTES # BLD AUTO: 0.3 X10E3/UL (ref 0.1–0.9)
MONOCYTES NFR BLD AUTO: 8 %
NEUTROPHILS # BLD AUTO: 2.5 X10E3/UL (ref 1.4–7)
NEUTROPHILS NFR BLD AUTO: 63 %
PLATELET # BLD AUTO: 208 X10E3/UL (ref 150–450)
RBC # BLD AUTO: 4.66 X10E6/UL (ref 4.14–5.8)
RPR SER QL: REACTIVE
RPR SER-TITR: ABNORMAL {TITER}
WBC # BLD AUTO: 4 X10E3/UL (ref 3.4–10.8)

## 2022-01-12 NOTE — PROGRESS NOTES
RD met with Tai Barksdale in conjunction with PCP appt, in order to address oral nutrition supplement  Tai Barksdale reported improving appetite, BMI is within normal weight range, and has had weight gain  He reported eating tuna and egg salad sandwiches, potatoes, rice, beans, carrots, fish, chicken, and vegetables  He does cook for himself  RD advised Ensure will be discontinued, and stressed that Tai Barksdale continue to work on consuming adequate kcal/protein through balanced diet  Pt was receptive and agreeable  Advised pt that weight is normal, advised pt of risks associated with overweight/obesity and excess weight gain  Pt mentioned drinking goat milk from [employer] on [address], RD stressed that all milk be pasteurized, strongly cautioned against raw milk, pt admitted he has gotten sick in the past from raw milk  Stressed food safety and food borne illness prevention  Pt discussed broken finger, broke while playing basketball  Tai Barksdale reported using mobile market voucher for fruits and vegetables  Wt Readings from Last 3 Encounters:   01/11/22 84 9 kg (187 lb 3 2 oz)   12/11/21 86 2 kg (190 lb)   09/10/21 83 6 kg (184 lb 3 2 oz)     Body mass index is 23 4 kg/m²  Albumin 3 9  Lab Results   Component Value Date    HGBA1C 5 6 01/11/2022     Monitor weight trend, nutritional lifestyle       Michael Harris, MS, RD, LDN

## 2022-01-13 ENCOUNTER — TELEPHONE (OUTPATIENT)
Dept: SURGERY | Facility: CLINIC | Age: 42
End: 2022-01-13

## 2022-01-13 LAB
C TRACH DNA SPEC QL NAA+PROBE: NEGATIVE
N GONORRHOEA DNA SPEC QL NAA+PROBE: NEGATIVE
T PALLIDUM AB SER QL IF: REACTIVE

## 2022-01-13 NOTE — TELEPHONE ENCOUNTER
Reminded patient of ortho appointment today, reports not feeling well since flu shot was administered  Rescheduled ortho appointment to 1/27 @ 2PM  Mailed appointment reminder

## 2022-01-20 DIAGNOSIS — S62.623A DISPLACED FRACTURE OF MIDDLE PHALANX OF LEFT MIDDLE FINGER, INITIAL ENCOUNTER FOR CLOSED FRACTURE: ICD-10-CM

## 2022-01-20 DIAGNOSIS — B20 AIDS (ACQUIRED IMMUNE DEFICIENCY SYNDROME) (HCC): ICD-10-CM

## 2022-01-20 DIAGNOSIS — B20 SYMPTOMATIC HIV INFECTION (HCC): ICD-10-CM

## 2022-01-20 RX ORDER — EMTRICITABINE AND TENOFOVIR ALAFENAMIDE 200; 25 MG/1; MG/1
TABLET ORAL
Qty: 30 TABLET | Refills: 1 | Status: SHIPPED | OUTPATIENT
Start: 2022-01-20 | End: 2022-03-28

## 2022-01-20 RX ORDER — IBUPROFEN 600 MG/1
TABLET ORAL
Qty: 30 TABLET | Refills: 0 | Status: SHIPPED | OUTPATIENT
Start: 2022-01-20 | End: 2022-02-28 | Stop reason: ALTCHOICE

## 2022-01-20 RX ORDER — DOLUTEGRAVIR SODIUM 50 MG/1
TABLET, FILM COATED ORAL
Qty: 30 TABLET | Refills: 1 | Status: SHIPPED | OUTPATIENT
Start: 2022-01-20 | End: 2022-03-28

## 2022-03-07 DIAGNOSIS — B20 SYMPTOMATIC HIV INFECTION (HCC): Primary | ICD-10-CM

## 2022-03-08 ENCOUNTER — PATIENT OUTREACH (OUTPATIENT)
Dept: SURGERY | Facility: CLINIC | Age: 42
End: 2022-03-08

## 2022-03-08 NOTE — PROGRESS NOTES
CT was discussed during huddle this morning  CT has not established care with [facility] since his intake  Ct is scheduled to see [facility] on 3/16/2022 at 415pm  CT did complete labs and has seen Ghulam Fried CM called and left message for CT to return call when he is able to

## 2022-03-11 ENCOUNTER — PATIENT OUTREACH (OUTPATIENT)
Dept: SURGERY | Facility: CLINIC | Age: 42
End: 2022-03-11

## 2022-03-11 NOTE — PROGRESS NOTES
CM called and left message for Ct to return call    CM and clinic discussed Ct as he was disclosed as a person who another Ct contracted HIV from   CT has not been seen at clinic for some time and needs to be reconnected

## 2022-03-15 ENCOUNTER — PATIENT OUTREACH (OUTPATIENT)
Dept: SURGERY | Facility: CLINIC | Age: 42
End: 2022-03-15

## 2022-03-15 NOTE — PROGRESS NOTES
Ct was discussed between CM and clinic  Ct has not been see by ID since his intake with [facility]  CM has called and left messages on Ct phone but he has not responded  CM will continue to reach out in hopes that he answers phone  CM was able to speak with Ct   CM reminded Ct that he has an appt tomorrow with Dr Luba Gold at [facility]

## 2022-03-16 ENCOUNTER — OFFICE VISIT (OUTPATIENT)
Dept: SURGERY | Facility: CLINIC | Age: 42
End: 2022-03-16
Payer: COMMERCIAL

## 2022-03-16 VITALS
SYSTOLIC BLOOD PRESSURE: 127 MMHG | WEIGHT: 184.6 LBS | DIASTOLIC BLOOD PRESSURE: 70 MMHG | HEART RATE: 69 BPM | TEMPERATURE: 98.2 F | BODY MASS INDEX: 22.95 KG/M2 | OXYGEN SATURATION: 99 % | HEIGHT: 75 IN

## 2022-03-16 DIAGNOSIS — B20 SYMPTOMATIC HIV INFECTION (HCC): ICD-10-CM

## 2022-03-16 DIAGNOSIS — T65.222D TOXIC EFFECT OF TOBACCO CIGARETTE, INTENTIONAL SELF-HARM, SUBSEQUENT ENCOUNTER: ICD-10-CM

## 2022-03-16 DIAGNOSIS — F79 MENTAL IMPAIRMENT: ICD-10-CM

## 2022-03-16 DIAGNOSIS — D72.819 LEUKOPENIA, UNSPECIFIED TYPE: ICD-10-CM

## 2022-03-16 DIAGNOSIS — A53.9 SYPHILIS: ICD-10-CM

## 2022-03-16 DIAGNOSIS — R73.03 PREDIABETES: ICD-10-CM

## 2022-03-16 DIAGNOSIS — Z23 NEED FOR PNEUMOCOCCAL VACCINATION: ICD-10-CM

## 2022-03-16 DIAGNOSIS — Z23 NEED FOR 23-POLYVALENT PNEUMOCOCCAL POLYSACCHARIDE VACCINE: ICD-10-CM

## 2022-03-16 DIAGNOSIS — E55.9 VITAMIN D DEFICIENCY: ICD-10-CM

## 2022-03-16 DIAGNOSIS — B20 HIV DISEASE (HCC): Primary | ICD-10-CM

## 2022-03-16 DIAGNOSIS — Z23 NEED FOR MENACTRA VACCINATION: ICD-10-CM

## 2022-03-16 PROCEDURE — 90472 IMMUNIZATION ADMIN EACH ADD: CPT

## 2022-03-16 PROCEDURE — 90734 MENACWYD/MENACWYCRM VACC IM: CPT

## 2022-03-16 PROCEDURE — 90732 PPSV23 VACC 2 YRS+ SUBQ/IM: CPT

## 2022-03-16 PROCEDURE — 90471 IMMUNIZATION ADMIN: CPT

## 2022-03-16 PROCEDURE — 99205 OFFICE O/P NEW HI 60 MIN: CPT | Performed by: INTERNAL MEDICINE

## 2022-03-16 NOTE — PROGRESS NOTES
Consultation - Infectious Disease   Naty Keshawn Lloyd 37 y o  male MRN: [id number]  Unit/Bed#:  Encounter: [id number]      IMPRESSION & RECOMMENDATIONS:   1  HIV-doing reasonably well on Tivicay and Descovy with an undetectable viral load and a CD4 count is increased to 58  Continue ART, recheck labs in 2 months and follow up in 3 months  Stressed adherence  Will also continue the Bactrim until his CD4 count goes up at least above 100 for 1 year or if his CD4 count increased to at least 200 for 3 months    2  Mental retardation-followed closely by case management and behaviorist    3  Vitamin-D deficiency-vitamin-D replacement    4  Nicotine dependence-continue to stressed importance of complete tobacco cessation    5  Pre diabetes-diet control for now  6  Leukopenia-mild with a neutrophil count over 1000  Will continue to monitor  7  Syphilis-perhaps serofast as the patient is titer has never really substantially decreased but he was treated in the past   Recheck RPR next visit     Patient was provided medication, adherence and prevention education    HISTORY OF PRESENT ILLNESS:  Reason for Consult: HIV  HPI: Senait Ivroy is a 37y o  year old male here for new patient evaluation for HIV  The patient was initially diagnosed in [facility] with heterosexual contact as a transmission  In 2006 he was started on Kaletra, Truvada, and Emtriva  It is unclear what he was on in the interim but he changed over to [address] at some point  He had previously been followed with in [facility] system  He was incarcerated over the past 4 years until recently  He was being [facility]  His last documented labs were in 2019 and at that time he had a viral load of less than 40 copies  He apparently has been off all medications since leaving snf in 2019  He 1st presented to our clinic his RPR was positive at 1:4 with a positive FTA    He was treated for syphilis on 9/10/2021 with benzathine penicillin g x3 doses  He has been non adherent with specially care appointments  On his initial representation his CD4 count was 21 and his viral load was over 400,000  He has now been started on Tivicay and Descovy as well as Bactrim  REVIEW OF SYSTEMS:  A complete review of systems is negative other than that noted in the HPI    PAST MEDICAL HISTORY:  No past medical history on file  No past surgical history on file  FAMILY HISTORY:  Non-contributory    SOCIAL HISTORY:  Social History   Social History     Substance and Sexual Activity   Alcohol Use Not Currently     Social History     Substance and Sexual Activity   Drug Use Not Currently    Frequency: 5 0 times per week    Types: Marijuana    Comment: every 2 days     Social History     Tobacco Use   Smoking Status Heavy Tobacco Smoker    Packs/day: 1 00    Years: 14 00    Pack years: 14 00    Types: Cigarettes   Smokeless Tobacco Current User    Types: Chew   Tobacco Comment    1 pack 2-3 day       ALLERGIES:  Allergies   Allergen Reactions    Nicotine Rash       MEDICATIONS:  All current active medications have been reviewed      PHYSICAL EXAM:  Vitals:    03/16/22 1636   BP: 127/70   BP Location: Right arm   Patient Position: Sitting   Cuff Size: Standard   Pulse: 69   Temp: 98 2 °F (36 8 °C)   SpO2: 99%   Weight: 83 7 kg (184 lb 9 6 oz)   Height: 6' 3" (1 905 m)         General Appearance:  Appearing well, nontoxic, and in no distress   Head:  Normocephalic, without obvious abnormality, atraumatic   Eyes:  Conjunctiva pink and sclera anicteric, both eyes   Nose: Nares normal, mucosa normal, no drainage   Throat: Oropharynx moist without lesions   Neck: Supple, symmetrical, no adenopathy, no tenderness/mass/nodules   Back:   Symmetric, no curvature, ROM normal, no CVA tenderness   Lungs:   Clear to auscultation bilaterally, respirations unlabored   Chest Wall:  No tenderness or deformity   Heart:  RRR; no murmur, rub or gallop   Abdomen:   Soft, non-tender, non-distended, positive bowel sounds,    Extremities: No cyanosis, clubbing or edema   Skin: No rashes or lesions  No draining wounds noted  Lymph nodes: Cervical, supraclavicular nodes normal   Neurologic: Alert and oriented times 3, extremity strength 5/5 and symmetric       LABS, IMAGING, & OTHER STUDIES:  Lab Results:  I have personally reviewed pertinent labs  Lab Results   Component Value Date    K 3 7 01/11/2022     01/11/2022    CO2 29 01/11/2022    BUN 8 01/11/2022    CREATININE 1 26 01/11/2022    GLUF 83 01/11/2022    CALCIUM 9 6 01/11/2022    AST 17 01/11/2022    ALT 18 01/11/2022    ALKPHOS 94 01/11/2022    EGFR 70 01/11/2022     Lab Results   Component Value Date    WBC 4 22 (L) 01/11/2022    WBC 4 0 01/11/2022    HGB 16 4 01/11/2022    HGB 15 2 01/11/2022    HCT 46 3 01/11/2022    HCT 45 6 01/11/2022    MCV 95 01/11/2022    MCV 98 (H) 01/11/2022     01/11/2022     01/11/2022     Lab Results   Component Value Date    HEPCAB Non-reactive 01/11/2022     Lab Results   Component Value Date    HAV Reactive (A) 06/15/2021    HEPCAB Non-reactive 01/11/2022     Lab Results   Component Value Date    RPR Reactive (A) 01/11/2022    RPR Reactive 4 dils (A) 01/11/2022     CD4 ABS   Date/Time Value Ref Range Status   01/11/2022 11:36 AM 58 (L) 359 - 1519 /uL Final     HIV-1 RNA by PCR, Qn   Date/Time Value Ref Range Status   01/11/2022 11:36 AM <20 copies/mL Final     Comment:     HIV-1 RNA detected  The reportable range for this assay is 20 to 10,000,000  copies HIV-1 RNA/mL

## 2022-03-17 ENCOUNTER — PATIENT OUTREACH (OUTPATIENT)
Dept: SURGERY | Facility: CLINIC | Age: 42
End: 2022-03-17
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
## 2022-03-17 NOTE — PROGRESS NOTES
RD met with Celeste Ellison In conjunction with ID appt, to check in on nutritional status and offer education and support as warranted and as pt was receptive  Celeste Ellison reported having poor appetite, not able to eat a lot recently  He admitted that he has been feeling high stress lately, RD explained potential link between poor appetite and emotional state  Pt eating some rice and beans, chicken, beef and does cook for himself  Pt also drinking soda, RD recommended not drinking so much soda in general and especially avoiding carbonated beverages while eating meal in order to prevent early satiety  Encouraged small frequent meals  Pt reported staying active  Weight and appetite had been improving, will monitor weight trend for now and consider restarting nutritional supplement  Albumin WNL    +/- 10%  94% IBW  BMI is WNL at 23 07  CD4 58   Wt Readings from Last 3 Encounters:   03/16/22 83 7 kg (184 lb 9 6 oz)   01/11/22 84 9 kg (187 lb 3 2 oz)   12/11/21 86 2 kg (190 lb)     Body mass index is 23 07 kg/m²  3 1% weight loss over 3 months    Lab Results   Component Value Date    HGBA1C 5 6 01/11/2022    HGBA1C 5 9 (H) 06/15/2021     Lab Results   Component Value Date    GLUF 83 01/11/2022    LDLCALC 115 (H) 01/11/2022    CREATININE 1 26 01/11/2022         Will continue to monitor his weight trend, labs, and nutritional intake  Consider Glucerna should weight fail to stabilize         Juan More, MS, RD, LDN
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
## 2022-03-25 DIAGNOSIS — B20 AIDS (ACQUIRED IMMUNE DEFICIENCY SYNDROME) (HCC): ICD-10-CM

## 2022-03-25 DIAGNOSIS — B20 SYMPTOMATIC HIV INFECTION (HCC): ICD-10-CM

## 2022-03-28 RX ORDER — DOLUTEGRAVIR SODIUM 50 MG/1
TABLET, FILM COATED ORAL
Qty: 30 TABLET | Refills: 1 | Status: SHIPPED | OUTPATIENT
Start: 2022-03-28 | End: 2022-06-13

## 2022-03-28 RX ORDER — EMTRICITABINE AND TENOFOVIR ALAFENAMIDE 200; 25 MG/1; MG/1
TABLET ORAL
Qty: 30 TABLET | Refills: 1 | Status: SHIPPED | OUTPATIENT
Start: 2022-03-28 | End: 2022-06-13

## 2022-03-31 ENCOUNTER — OFFICE VISIT (OUTPATIENT)
Dept: SURGERY | Facility: CLINIC | Age: 42
End: 2022-03-31
Payer: COMMERCIAL

## 2022-03-31 VITALS
DIASTOLIC BLOOD PRESSURE: 60 MMHG | SYSTOLIC BLOOD PRESSURE: 128 MMHG | BODY MASS INDEX: 22.88 KG/M2 | HEART RATE: 74 BPM | TEMPERATURE: 97.1 F | RESPIRATION RATE: 18 BRPM | HEIGHT: 75 IN | WEIGHT: 184 LBS | OXYGEN SATURATION: 98 %

## 2022-03-31 DIAGNOSIS — R53.83 FATIGUE DUE TO DEPRESSION: ICD-10-CM

## 2022-03-31 DIAGNOSIS — Z23 NEED FOR HPV VACCINATION: ICD-10-CM

## 2022-03-31 DIAGNOSIS — Z70.9 SEXUAL COUNSELING: ICD-10-CM

## 2022-03-31 DIAGNOSIS — B20 SYMPTOMATIC HIV INFECTION (HCC): Primary | ICD-10-CM

## 2022-03-31 DIAGNOSIS — F32.89 OTHER DEPRESSION: ICD-10-CM

## 2022-03-31 DIAGNOSIS — F32.A FATIGUE DUE TO DEPRESSION: ICD-10-CM

## 2022-03-31 PROCEDURE — 99213 OFFICE O/P EST LOW 20 MIN: CPT | Performed by: NURSE PRACTITIONER

## 2022-03-31 RX ORDER — MULTIVITAMIN
1 TABLET ORAL DAILY
Qty: 90 TABLET | Refills: 1 | Status: SHIPPED | OUTPATIENT
Start: 2022-03-31

## 2022-03-31 NOTE — PROGRESS NOTES
Assessment/Plan:  · Discussed what sex is and what is considered sexual acts and how the transmission of STD's and HIV can be passed on to others even through kissing or exchange of sexual fluids  Practicing safe sex using a condom for each sexually encounter  Condoms offer the best protection against STD's by acting as a physical barrier to prevent the exchange of semen, vaginal fluids, and blood between partners  Condoms are not a 100% effective in protection  · Reducing the number of sexual partners can also help to reduce the risk of the transmission of STD's along with regular STD screening  · Recommend cutting down the amount of marijuana daily use  · Check TSH  Symptomatic HIV infection ([facility] [address] )  Doing well on Descovy & Tivicay with and undetectable VL  Pt reports 100% medication compliance  Stressed the importance of 100% medication adherence  HIV Counseling:    Viral Load: < 20    CD4 Count: 58    Denies side effects  Stressed the importance of adherence  Continue follow up in the ID clinic with Dr Guillen Meridian  Reviewed the most recent labs, including the CD4 and viral load  Discussed the risks and benefits of treatment options, instructions for management, importance of treatment adherence, and reduction of risk factors  Educated on possible medication side effects  Counseled on routes of HIV transmission, including the risk of  infection  Emphasized that viral suppression is the best method to prevent HIV transmission  At this time the pt denies the need for HIV testing of anyone in their life  Total encounter time was greater than 35 minutes  Greater than 20 minutes were spent on counseling and patient education  Pt voices understanding and agreement with treatment plan  Depression  Denies SI or HI    · Recommend referral to [address] [place] [address]  · Will check TSH  · Call office with worsening depression  · Marijuana cessation       Diagnoses and all orders for this visit:    Symptomatic HIV infection ([facility] [address] )  -     Multiple Vitamin (multivitamin) tablet; Take 1 tablet by mouth daily    Need for HPV vaccination  -     HPV VACCINE 9 VALENT IM    Sexual counseling    Fatigue due to depression    Other depression          Subjective:      Patient ID: Rey Wilson is a 37 y o  male  HPI    Pt is being seen for follow up visit and management of HIV  Pt reports he has been feeling tired for a couple of days and has been doing exercises and still feels tired and on left side feels like something is pulsing since yesterday and is a sharp pain  Once pt started walking it got better and since gone away  Pt esta something first and does have some gagging when he takes his HAART  Pt talked about having HIV, and not being able to find a job since he was incarcerated, wanting to get a job, and wanting to find his own place to live  Pt was reminiscing about his past  Pt denies having any sexual relations but did ask for condoms  Pt was taught by his mother to talk to males regarding sex and not women  Pt expressed he was uncomfortable about discussing sex  Pt does have a friend that he is getting the condoms for  Pt inquired about a daily vitamin  Pt denies any fever, chills, sob, nausea, vomiting, or diarrhea  The following portions of the patient's history were reviewed and updated as appropriate: allergies, current medications, past medical history, past social history and problem list     Review of Systems   Constitutional: Negative  HENT: Negative  Respiratory: Negative  Cardiovascular: Negative  Neurological: Negative  Psychiatric/Behavioral: Negative  Objective:      /60 (BP Location: Right arm, Patient Position: Sitting, Cuff Size: Standard)   Pulse 74   Temp (!) 97 1 °F (36 2 °C)   Resp 18   Ht 6' 3" (1 905 m)   Wt 83 5 kg (184 lb)   SpO2 98%   BMI 23 00 kg/m²          Physical Exam  Vitals and nursing note reviewed  Constitutional:       Appearance: Normal appearance  Cardiovascular:      Rate and Rhythm: Normal rate and regular rhythm  Chest Wall: PMI is not displaced  Pulses: Normal pulses  Heart sounds: Normal heart sounds  Pulmonary:      Effort: Pulmonary effort is normal       Breath sounds: Normal breath sounds  Neurological:      Mental Status: He is alert and oriented to person, place, and time  Psychiatric:         Mood and Affect: Mood normal          Behavior: Behavior normal          Thought Content:  Thought content normal          Judgment: Judgment normal

## 2022-03-31 NOTE — ASSESSMENT & PLAN NOTE
Doing well on [address] with and undetectable VL  Pt reports 100% medication compliance  Stressed the importance of 100% medication adherence  HIV Counseling:    Viral Load: < 20    CD4 Count: 58    Denies side effects  Stressed the importance of adherence  Continue follow up in the ID clinic with Dr Gay Mention  Reviewed the most recent labs, including the CD4 and viral load  Discussed the risks and benefits of treatment options, instructions for management, importance of treatment adherence, and reduction of risk factors  Educated on possible medication side effects  Counseled on routes of HIV transmission, including the risk of  infection  Emphasized that viral suppression is the best method to prevent HIV transmission  At this time the pt denies the need for HIV testing of anyone in their life  Total encounter time was greater than 35 minutes  Greater than 20 minutes were spent on counseling and patient education  Pt voices understanding and agreement with treatment plan

## 2022-03-31 NOTE — ASSESSMENT & PLAN NOTE
Denies SI or HI    · Recommend referral to [facility]  · Will check TSH  · Call office with worsening depression  · Marijuana cessation

## 2022-04-01 NOTE — PROGRESS NOTES
RD met with Susan Coughlin In conjunction with PCP appt, pt had screened positive for food insecurity  Pt then admitted that he had "so much food" at home, cooks for himself, and has plenty of SNAP  Pt was not provided emergency food box, he was made aware to reach out should situation change and he need food  Pt was provided with contact card for food pantry coordinator at [facility], which he states he has gone to for food  Susan Coughlin c/o ongoing poor appetite, continues to feel emotionally stressed  RD encouraged that he reach out to [employer] for support  Stressed making time to eat small meals throughout the day, pt says he does make himself eat  Weight stable  Continue to monitor  Weight and appetite had been improving, will monitor weight trend for now and consider restarting nutritional supplement  Albumin WNL    +/- 10%  94% IBW  BMI is WNL at 23 00  CD4 58     Wt Readings from Last 3 Encounters:   03/31/22 83 5 kg (184 lb)   03/16/22 83 7 kg (184 lb 9 6 oz)   01/11/22 84 9 kg (187 lb 3 2 oz)     Body mass index is 23 kg/m²  Lab Results   Component Value Date    HGBA1C 5 6 01/11/2022    HGBA1C 5 9 (H) 06/15/2021     Lab Results   Component Value Date    GLUF 83 01/11/2022    LDLCALC 115 (H) 01/11/2022    CREATININE 1 26 01/11/2022         Will continue to monitor his weight trend, labs, and nutritional intake  Consider Glucerna should weight fail to stabilize         Gerard Nobles, MS, RD, LDN

## 2022-04-04 ENCOUNTER — PATIENT OUTREACH (OUTPATIENT)
Dept: SURGERY | Facility: CLINIC | Age: 42
End: 2022-04-04

## 2022-04-04 PROCEDURE — 90651 9VHPV VACCINE 2/3 DOSE IM: CPT

## 2022-04-04 PROCEDURE — 90471 IMMUNIZATION ADMIN: CPT
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
## 2022-04-07 ENCOUNTER — PATIENT OUTREACH (OUTPATIENT)
Dept: SURGERY | Facility: CLINIC | Age: 42
End: 2022-04-07

## 2022-04-11 ENCOUNTER — PATIENT OUTREACH (OUTPATIENT)
Dept: SURGERY | Facility: CLINIC | Age: 42
End: 2022-04-11

## 2022-04-11 NOTE — PROGRESS NOTES
CT called this morning wanting to know if he had an appt with CM  At that moment, Ct did not  CM scheduled an appt for him today at 11am because Ct needs to sign documents for [employer]  Ct did not show up at 11am and decided to come to clinic at 1pm but CM was unable to see isidro Burgess provided CM business card  CM will wait for Ct to contact CM

## 2022-04-14 ENCOUNTER — PATIENT OUTREACH (OUTPATIENT)
Dept: SURGERY | Facility: CLINIC | Age: 42
End: 2022-04-14
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
## 2022-04-15 ENCOUNTER — PATIENT OUTREACH (OUTPATIENT)
Dept: SURGERY | Facility: CLINIC | Age: 42
End: 2022-04-15

## 2022-04-15 NOTE — PROGRESS NOTES
CM called and spoke with Ct in regards to call that was made to clinic in regards to someone giving out his information to others  CM asked Ct what she could help him with  He stated he wanted to know about housing and if [employer] offered any assistance  CM told Ct that in order to receive housing assistance, he needed to have an income  CM asked about the phone call he made yesterday to clinic in regards to his identity  Ct states there is a worker here who asked another Ct his information, Birth date, social security, etc  CM reiterated that anyone who has contact with Ct has his information and would not need to ask a Ct anything about him, furthermore the workers here are not going to risk losing their jobs over asking another Ct questions  Ct seemed to be upset over CM comment, wanted to be reminded of scheduled appt for Monday which is at 1pm and hung up the phone abruptly

## 2022-04-21 ENCOUNTER — PATIENT OUTREACH (OUTPATIENT)
Dept: SURGERY | Facility: CLINIC | Age: 42
End: 2022-04-21

## 2022-04-22 NOTE — PROGRESS NOTES
CT called stating that he will be picking up form he dropped off a few weeks ago and have someone else help him fill it out  CM discussed with clinic that form will be in CM mailbox for Ct when he picks up

## 2022-04-27 ENCOUNTER — PATIENT OUTREACH (OUTPATIENT)
Dept: SURGERY | Facility: CLINIC | Age: 42
End: 2022-04-27

## 2022-04-27 NOTE — PROGRESS NOTES
DEVIN and Lala discussed Ct  Ct called asking for help with paperwork that he picked up last week after missing 2 different appts set  Ct states that he wants to speak with CM sup to change CM but Duane Hatcher reminded him that he would have to meet with any CM he is given face to face   CM will reach out to Ct tomorrow

## 2022-05-31 ENCOUNTER — PATIENT OUTREACH (OUTPATIENT)
Dept: SURGERY | Facility: CLINIC | Age: 42
End: 2022-05-31

## 2022-06-08 ENCOUNTER — PATIENT OUTREACH (OUTPATIENT)
Dept: SURGERY | Facility: CLINIC | Age: 42
End: 2022-06-08

## 2022-06-08 NOTE — PROGRESS NOTES
Writer called client as he requested to talk to case , when I introduced myself, per client complained that  is not  assisting him and ranted on and on on all his needs in life  I told him that in the electronic medical record there are encounters that differ and note case management's assurance  He started yelling and cursing, when I tried to speak he interrupted me , when I  asked him to allow me to answer him and speak, he hung up the phone on me  Radha Nieto  Manager Case Management

## 2022-06-10 NOTE — PROGRESS NOTES
Cm and Cm sup discussed CT  CM will reach out to Ct on Monday June 13th to try and get Ct to recert if he still wants case management services   If not, CM will connect Ct to [facility] for CM

## 2022-06-13 ENCOUNTER — TELEPHONE (OUTPATIENT)
Dept: SURGERY | Facility: CLINIC | Age: 42
End: 2022-06-13

## 2022-06-13 DIAGNOSIS — B20 AIDS (ACQUIRED IMMUNE DEFICIENCY SYNDROME) (HCC): ICD-10-CM

## 2022-06-13 DIAGNOSIS — B20 SYMPTOMATIC HIV INFECTION (HCC): ICD-10-CM

## 2022-06-13 RX ORDER — EMTRICITABINE AND TENOFOVIR ALAFENAMIDE 200; 25 MG/1; MG/1
TABLET ORAL
Qty: 30 TABLET | Refills: 1 | Status: SHIPPED | OUTPATIENT
Start: 2022-06-13 | End: 2022-08-10

## 2022-06-13 RX ORDER — DOLUTEGRAVIR SODIUM 50 MG/1
TABLET, FILM COATED ORAL
Qty: 30 TABLET | Refills: 1 | Status: SHIPPED | OUTPATIENT
Start: 2022-06-13 | End: 2022-08-10

## 2022-06-21 ENCOUNTER — TELEPHONE (OUTPATIENT)
Dept: SURGERY | Facility: CLINIC | Age: 42
End: 2022-06-21

## 2022-06-21 NOTE — TELEPHONE ENCOUNTER
Patient called asking for refills, patient aware he needs labs done and f/u appt with ID and PCP  CCN phone number provided to patient for his 30 day refill

## 2022-07-05 ENCOUNTER — PATIENT OUTREACH (OUTPATIENT)
Dept: SURGERY | Facility: CLINIC | Age: 42
End: 2022-07-05

## 2022-07-05 NOTE — PROGRESS NOTES
CM called and left message with mother to tell Ct to call CM when he has a chance  CM needs to schedule recert with Ct if Ct still wants CM services    Ct called back   recert scheduled for 7/3/1692 at 10am  Ct was told what documentation is needed

## 2022-07-06 ENCOUNTER — PATIENT OUTREACH (OUTPATIENT)
Dept: SURGERY | Facility: CLINIC | Age: 42
End: 2022-07-06
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
## 2022-07-11 ENCOUNTER — PATIENT OUTREACH (OUTPATIENT)
Dept: SURGERY | Facility: CLINIC | Age: 42
End: 2022-07-11

## 2022-07-11 DIAGNOSIS — Z29.8 NEED FOR PNEUMOCYSTIS PROPHYLAXIS: ICD-10-CM

## 2022-07-12 RX ORDER — SULFAMETHOXAZOLE AND TRIMETHOPRIM 800; 160 MG/1; MG/1
TABLET ORAL
Qty: 30 TABLET | Refills: 5 | Status: SHIPPED | OUTPATIENT
Start: 2022-07-12 | End: 2022-10-10

## 2022-07-27 ENCOUNTER — PATIENT OUTREACH (OUTPATIENT)
Dept: SURGERY | Facility: CLINIC | Age: 42
End: 2022-07-27

## 2022-07-28 ENCOUNTER — PATIENT OUTREACH (OUTPATIENT)
Dept: SURGERY | Facility: CLINIC | Age: 42
End: 2022-07-28

## 2022-07-28 NOTE — PROGRESS NOTES
DEVIN received message from Salima 89 that [address] wants to meet with me after his appt on 8/1/2022   CM called Ct to schedule appt for after his appt with Too on Monday COVERING FOR DR. KENDAL ABEBE  [facility], Division of Infectious Diseases  KATHERINE Nelson Y. Patel, S. Shah  [phone]    Name: ANKIT CARTWRIGHT  Age: 89y  Gender: Female  MRN: [id number]  Note Date: 11-22-20    Interval History:  Asked to see pt for leukocytosis  Patient seen and examined at bedside.   Afebrile overnight, but leukocytosis 15 -->22  Notes reviewed    Antibiotics:  piperacillin/tazobactam IVPB.. 3.375 Gram(s) IV Intermittent every 8 hours      Medications:  acetaminophen   Tablet .. 650 milliGRAM(s) Oral every 6 hours PRN  allopurinol 100 milliGRAM(s) Oral daily  aluminum hydroxide/magnesium hydroxide/simethicone Suspension 30 milliLiter(s) Oral every 6 hours PRN  amLODIPine   Tablet 10 milliGRAM(s) Oral daily  cinacalcet 60 milliGRAM(s) Oral daily  dextrose 40% Gel 15 Gram(s) Oral once  dextrose 5%. 1000 milliLiter(s) IV Continuous <Continuous>  dextrose 5%. 1000 milliLiter(s) IV Continuous <Continuous>  dextrose 50% Injectable 25 Gram(s) IV Push once  dextrose 50% Injectable 12.5 Gram(s) IV Push once  dextrose 50% Injectable 25 Gram(s) IV Push once  gabapentin 100 milliGRAM(s) Oral two times a day  glucagon  Injectable 1 milliGRAM(s) IntraMuscular once  hydrALAZINE 75 milliGRAM(s) Oral three times a day  influenza  Vaccine (HIGH DOSE) 0.7 milliLiter(s) IntraMuscular once  insulin lispro (ADMELOG) corrective regimen sliding scale   SubCutaneous three times a day before meals  insulin lispro (ADMELOG) corrective regimen sliding scale   SubCutaneous at bedtime  latanoprost 0.005% Ophthalmic Solution 1 Drop(s) Both EYES at bedtime  levothyroxine 75 MICROGram(s) Oral daily  oxyCODONE    IR 5 milliGRAM(s) Oral every 8 hours PRN  piperacillin/tazobactam IVPB.. 3.375 Gram(s) IV Intermittent every 8 hours  potassium chloride    Tablet ER 20 milliEquivalent(s) Oral every 2 hours  senna 2 Tablet(s) Oral at bedtime  simvastatin 20 milliGRAM(s) Oral at bedtime  sodium chloride 0.9%. 1000 milliLiter(s) IV Continuous <Continuous>      Review of Systems:  A 10-point review of systems was obtained.     Pertinent positives and negatives--  Constitutional: No fevers. No Chills. No Rigors.   Cardiovascular: No chest pain. No palpitations.  Respiratory: No shortness of breath. No cough.  Gastrointestinal: No nausea or vomiting. No diarrhea or constipation.   Psychiatric: Pleasant. Appropriate affect.    Review of systems otherwise negative except as previously noted.    Allergies: No Known Allergies    For details regarding the patient's past medical history, social history, family history, and other miscellaneous elements, please refer the initial infectious diseases consultation and/or the admitting history and physical examination for this admission.    Objective:  Vitals:   T(C): 36.6 (11-22-20 @ 05:24), Max: 37.4 (11-21-20 @ 20:58)  HR: 84 (11-22-20 @ 05:24) (67 - 84)  BP: 138/75 (11-22-20 @ 05:24) (132/65 - 140/62)  RR: 16 (11-22-20 @ 05:24) (16 - 17)  SpO2: 98% (11-22-20 @ 05:24) (96% - 99%)    Physical Examination:  General: no acute distress  HEENT: NC/AT, EOMI, anicteric, no oral lesions  Neck: supple, no palpable LAD  Cardio: S1, S2 heard, RRR, no murmurs  Resp: breath sounds heard bilaterally, no rales, wheezes or rhonchi  Abd: soft, NT, ND, + bowel sounds  Neuro: AAOx3, no obvious focal deficits  Ext: no edema or cyanosis Dry gangrene to the left hallux and 2nd digit, no malodor present, no purulence, no drainage, no erythema, no cellulitis  Skin: warm, dry, no visible rash    Laboratory Studies:  CBC:                       8.2    22.67 )-----------( 211      ( 22 Nov 2020 06:26 )             27.4     CMP: 11-22    138  |  98  |  68<H>  ----------------------------<  118<H>  3.2<L>   |  27  |  2.92<H>    Ca    7.6<L>      22 Nov 2020 06:26  Phos  3.6     11-22  Mg     1.8     11-22          Microbiology:  reviewed    Radiology:  reviewed

## 2022-08-01 ENCOUNTER — PATIENT OUTREACH (OUTPATIENT)
Dept: SURGERY | Facility: CLINIC | Age: 42
End: 2022-08-01

## 2022-08-01 ENCOUNTER — APPOINTMENT (OUTPATIENT)
Dept: LAB | Facility: CLINIC | Age: 42
End: 2022-08-01
Payer: COMMERCIAL

## 2022-08-01 NOTE — PROGRESS NOTES
Ct was scheduled to meet with CM today at 1030am  Ct did not make his appt with PCP at 930am  He showed up at 10am and was told he needed to reschedule as he was a half hour late  CT became frustrated and left clinic without meeting with CM  CM called and left message for Ct to return call

## 2022-08-02 ENCOUNTER — HOSPITAL ENCOUNTER (EMERGENCY)
Facility: HOSPITAL | Age: 42
Discharge: HOME | End: 2022-08-02
Attending: EMERGENCY MEDICINE

## 2022-08-02 ENCOUNTER — APPOINTMENT (EMERGENCY)
Dept: RADIOLOGY | Facility: HOSPITAL | Age: 42
End: 2022-08-02

## 2022-08-02 VITALS
TEMPERATURE: 98.1 F | HEART RATE: 63 BPM | OXYGEN SATURATION: 96 % | DIASTOLIC BLOOD PRESSURE: 79 MMHG | SYSTOLIC BLOOD PRESSURE: 125 MMHG | RESPIRATION RATE: 18 BRPM

## 2022-08-02 DIAGNOSIS — S93.409A ANKLE SPRAIN: Primary | ICD-10-CM

## 2022-08-02 PROCEDURE — 73600 X-RAY EXAM OF ANKLE: CPT

## 2022-08-02 PROCEDURE — 99282 EMERGENCY DEPT VISIT SF MDM: CPT | Performed by: EMERGENCY MEDICINE

## 2022-08-02 PROCEDURE — 99283 EMERGENCY DEPT VISIT LOW MDM: CPT

## 2022-08-02 NOTE — Clinical Note
Janay Warner was seen and treated in our emergency department on 8/2/2022  No restrictions    Other - See Comments    L ankle - need crutches for one week    Diagnosis: L ankle sprain    Millie  is off the rest of the shift today  He may return on this date: 08/09/2022    Needs to be off ankle for one week  Please excuse from work  If you have any questions or concerns, please don't hesitate to call        Mich Ramos, DO    ______________________________           _______________          _______________  Hospital Representative                              Date                                Time

## 2022-08-02 NOTE — ED PROVIDER NOTES
History  Chief Complaint   Patient presents with    Ankle Injury     Dropped something on left ankle today     40 y o  M states he rolled his ankle while ambulating today  Pain to lateral malleolus  Mild swelling and bruising  NV intact distal to injury  Pain w weight bearing  No other injuries occurred  Prior to Admission Medications   Prescriptions Last Dose Informant Patient Reported? Taking? Cholecalciferol 125 MCG (5000 UT) capsule   Yes No   Sig: Take 1 capsule by mouth   Descovy 200-25 MG tablet   No No   Sig: TAKE 1 TABLET BY MOUTH IN THE MORNING   Multiple Vitamin (multivitamin) tablet   No No   Sig: Take 1 tablet by mouth daily   Tivicay 50 MG TABS   No No   Sig: TAKE 1 TABLET BY MOUTH IN THE MORNING   acetaminophen (TYLENOL) 650 mg CR tablet   No No   Sig: Take 1 tablet (650 mg total) by mouth every 8 (eight) hours as needed for mild pain   albuterol (Ventolin HFA) 90 mcg/act inhaler   No No   Sig: Inhale 2 puffs every 6 (six) hours as needed for wheezing   sulfamethoxazole-trimethoprim (BACTRIM DS) 800-160 mg per tablet   No No   Sig: TAKE 1 TABLET BY MOUTH IN THE MORNING      Facility-Administered Medications: None       No past medical history on file  No past surgical history on file  Family History   Problem Relation Age of Onset    Depression Mother      I have reviewed and agree with the history as documented  E-Cigarette/Vaping     E-Cigarette/Vaping Substances     Social History     Tobacco Use    Smoking status: Heavy Tobacco Smoker     Packs/day: 1 00     Years: 14 00     Pack years: 14 00     Types: Cigarettes    Smokeless tobacco: Current User     Types: Chew    Tobacco comment: 1 pack every 3 days   Substance Use Topics    Alcohol use: Not Currently    Drug use: Not Currently     Frequency: 5 0 times per week     Types: Marijuana     Comment: every 2 days       Review of Systems   Musculoskeletal: Positive for gait problem (pain w ambulation)          L ankle pain to lateral aspect  Swelling  Mild bruising  Tenderness to lateral aspect, no deformity     Skin: Positive for color change (bruising)  Negative for wound  Neurological: Negative for weakness and numbness  All other systems reviewed and are negative  Physical Exam  Physical Exam  Vitals reviewed  Constitutional:       General: He is not in acute distress  Appearance: He is well-developed  He is not diaphoretic  HENT:      Head: Normocephalic and atraumatic  Eyes:      Conjunctiva/sclera: Conjunctivae normal    Pulmonary:      Effort: Pulmonary effort is normal  No respiratory distress  Breath sounds: Normal breath sounds  Musculoskeletal:         General: Tenderness present  Cervical back: Normal range of motion and neck supple  Comments: L ankle pain to lateral aspect  Swelling  Mild bruising  Tenderness to lateral aspect, no deformity   Skin:     General: Skin is warm and dry  Capillary Refill: Capillary refill takes less than 2 seconds  Coloration: Skin is not pale  Findings: Bruising present  Neurological:      Mental Status: He is alert and oriented to person, place, and time  Cranial Nerves: No cranial nerve deficit     Psychiatric:         Behavior: Behavior normal          Vital Signs  ED Triage Vitals [08/02/22 1428]   Temperature Pulse Respirations Blood Pressure SpO2   98 1 °F (36 7 °C) 63 18 125/79 96 %      Temp src Heart Rate Source Patient Position - Orthostatic VS BP Location FiO2 (%)   -- -- -- -- --      Pain Score       --           Vitals:    08/02/22 1428   BP: 125/79   Pulse: 63         Visual Acuity      ED Medications  Medications - No data to display    Diagnostic Studies  Results Reviewed     None                 XR ankle 2 views LEFT    (Results Pending)              Procedures  Orthopedic injury treatment    Date/Time: 8/2/2022 3:57 PM  Performed by: John Max DO  Authorized by: John Max DO Patient Location:  ED  [place] Protocol:  Consent: Verbal consent obtained  Risks and benefits: risks, benefits and alternatives were discussed  Consent given by: patient  Patient understanding: patient states understanding of the procedure being performed  Relevant documents: relevant documents present and verified  Radiology Images displayed and confirmed  If images not available, report reviewed: imaging studies available  Patient identity confirmed: verbally with patient      Injury location:  Ankle  Neurovascular status: Neurovascularly intact    Distal perfusion: normal    Neurological function: normal    Range of motion: reduced    Skeletal traction used?: No    Neurovascular status: Neurovascularly intact    Distal perfusion: normal    Neurological function: normal    Range of motion: normal    Patient tolerance:  Patient tolerated the procedure well with no immediate complications   Air splint and crutches  LEFT             ED Course  ED Course as of 08/02/22 1556   Tue Aug 02, 2022   1508 Ankle sprain  Denies the need for pain control  Air splint, crutches, f/u w podiatry                                             [facility]  Number of Diagnoses or Management Options  Ankle sprain  Diagnosis management comments: L ankle sprain, no osseous abnormality  Put in air cast, crutches  Advised f/u podiatry  Given work note  Given RTED precautions  Amount and/or Complexity of Data Reviewed  Tests in the radiology section of CPT®: ordered and reviewed        Disposition  Final diagnoses:    Ankle sprain     Time reflects when diagnosis was documented in both MDM as applicable and the Disposition within this note     Time User Action Codes Description Comment    8/2/2022  3:13 PM Sharmila Agee Add [[id number]] Ankle sprain       ED Disposition     ED Disposition   Discharge    Condition   Stable    Date/Time   Tue Aug 2, 2022  3:13 PM    Comment   [facility] discharge to home/self care                Follow-up Information     Follow up With Specialties Details Why Contact Info Additional 551 Forrest Mckeon DPM Podiatry Schedule an appointment as soon as possible for a visit  As needed for follow up [address] [place] [address] Emergency Department Emergency Medicine  If symptoms worsen: severe pain, discoloration, numbness, weakness, etc 215 [facility] Joana Simpson 109 [facility] Emergency Department, [address] [place] [address], [address] [place] [address], [facility]          Discharge Medication List as of 8/2/2022  3:16 PM      CONTINUE these medications which have NOT CHANGED    Details   acetaminophen (TYLENOL) 650 mg CR tablet Take 1 tablet (650 mg total) by mouth every 8 (eight) hours as needed for mild pain, Starting Sat 12/11/2021, Print      albuterol (Ventolin HFA) 90 mcg/act inhaler Inhale 2 puffs every 6 (six) hours as needed for wheezing, Starting Thu 12/9/2021, Normal      Cholecalciferol 125 MCG (5000 UT) capsule Take 1 capsule by mouth, Historical Med      Descovy 200-25 MG tablet TAKE 1 TABLET BY MOUTH IN THE MORNING, Normal      Multiple Vitamin (multivitamin) tablet Take 1 tablet by mouth daily, Starting Thu 3/31/2022, Normal      sulfamethoxazole-trimethoprim (BACTRIM DS) 800-160 mg per tablet TAKE 1 TABLET BY MOUTH IN THE MORNING, Normal      Tivicay 50 MG TABS TAKE 1 TABLET BY MOUTH IN THE MORNING, Normal             No discharge procedures on file      PDMP Review     None          ED Provider  Electronically Signed by           Laura Zuñiga, DO  08/02/22 Yari Valles, DO  08/02/22 1554

## 2022-08-02 NOTE — DISCHARGE INSTRUCTIONS
Use the crutches to stay off the affected ankle for the next one week  Use the splint for the next 2 weeks  Use ibuprofen / tylenol as needed for pain control

## 2022-08-11 ENCOUNTER — PATIENT OUTREACH (OUTPATIENT)
Dept: SURGERY | Facility: CLINIC | Age: 42
End: 2022-08-11
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
## 2022-08-26 NOTE — ASSESSMENT & PLAN NOTE
Pt does not no where to go for treatment  Will reach out to [facility] to see if insurance covers shots in the office       · Follow up with [facility] PAST SURGICAL HISTORY:  No significant past surgical history

## 2022-09-02 DIAGNOSIS — B20 AIDS (ACQUIRED IMMUNE DEFICIENCY SYNDROME) (HCC): ICD-10-CM

## 2022-09-02 DIAGNOSIS — B20 SYMPTOMATIC HIV INFECTION (HCC): ICD-10-CM

## 2022-09-06 RX ORDER — EMTRICITABINE AND TENOFOVIR ALAFENAMIDE 200; 25 MG/1; MG/1
TABLET ORAL
Qty: 30 TABLET | Refills: 0 | Status: SHIPPED | OUTPATIENT
Start: 2022-09-06 | End: 2022-10-05

## 2022-09-06 RX ORDER — DOLUTEGRAVIR SODIUM 50 MG/1
TABLET, FILM COATED ORAL
Qty: 30 TABLET | Refills: 0 | Status: SHIPPED | OUTPATIENT
Start: 2022-09-06 | End: 2022-10-05

## 2022-09-12 ENCOUNTER — PATIENT OUTREACH (OUTPATIENT)
Dept: SURGERY | Facility: CLINIC | Age: 42
End: 2022-09-12

## 2022-09-13 NOTE — PROGRESS NOTES
CT called DEVIN stating that he received paperwork from workman's comp that he is receiving income but is not  Again CM recommended Ct go to social security office to report fraud  Ct is unable to receive food stamps because this income comes up and he is denied the assistance   CT will f/u with CM about his findings Detail Level: Zone

## 2022-09-16 NOTE — PROGRESS NOTES
Pt does not qualify for nutritional supplements at this time  Last BMI: 23 00  Spoke with Kaushik Agustin and pt was told he does not qualify for nutritional supplements at this time  Pt has not been seen in 6 months and would need an evaluation at his next appointment

## 2022-09-27 ENCOUNTER — PATIENT OUTREACH (OUTPATIENT)
Dept: SURGERY | Facility: CLINIC | Age: 42
End: 2022-09-27

## 2022-09-28 ENCOUNTER — PATIENT OUTREACH (OUTPATIENT)
Dept: SURGERY | Facility: CLINIC | Age: 42
End: 2022-09-28

## 2022-10-05 ENCOUNTER — OFFICE VISIT (OUTPATIENT)
Dept: SURGERY | Facility: CLINIC | Age: 42
End: 2022-10-05
Payer: COMMERCIAL

## 2022-10-05 ENCOUNTER — DOCUMENTATION (OUTPATIENT)
Dept: SURGERY | Facility: CLINIC | Age: 42
End: 2022-10-05

## 2022-10-05 VITALS — BODY MASS INDEX: 23.75 KG/M2 | HEART RATE: 70 BPM | OXYGEN SATURATION: 97 % | TEMPERATURE: 97.3 F | WEIGHT: 190 LBS

## 2022-10-05 DIAGNOSIS — Z23 NEED FOR INFLUENZA VACCINATION: ICD-10-CM

## 2022-10-05 DIAGNOSIS — E55.9 VITAMIN D DEFICIENCY: ICD-10-CM

## 2022-10-05 DIAGNOSIS — F79 MENTAL IMPAIRMENT: ICD-10-CM

## 2022-10-05 DIAGNOSIS — A53.9 SYPHILIS: ICD-10-CM

## 2022-10-05 DIAGNOSIS — Z23 NEED FOR HPV VACCINATION: ICD-10-CM

## 2022-10-05 DIAGNOSIS — T65.224D TOXIC EFFECT OF TOBACCO CIGARETTE, UNDETERMINED INTENT, SUBSEQUENT ENCOUNTER: ICD-10-CM

## 2022-10-05 DIAGNOSIS — B20 HIV DISEASE (HCC): Primary | ICD-10-CM

## 2022-10-05 DIAGNOSIS — K08.9 POOR DENTITION: ICD-10-CM

## 2022-10-05 PROBLEM — R41.89 COGNITIVE IMPAIRMENT: Status: ACTIVE | Noted: 2022-10-05

## 2022-10-05 PROCEDURE — 99215 OFFICE O/P EST HI 40 MIN: CPT | Performed by: INTERNAL MEDICINE

## 2022-10-05 PROCEDURE — 90471 IMMUNIZATION ADMIN: CPT

## 2022-10-05 PROCEDURE — 90682 RIV4 VACC RECOMBINANT DNA IM: CPT

## 2022-10-05 PROCEDURE — 90651 9VHPV VACCINE 2/3 DOSE IM: CPT

## 2022-10-05 PROCEDURE — 90472 IMMUNIZATION ADMIN EACH ADD: CPT

## 2022-10-05 NOTE — PROGRESS NOTES
Progress Note - Infectious Disease   Ashia Lloyd 40 y o  male MRN: [id number]  Unit/Bed#:  Encounter: [id number]      Impression/Plan:  1  HIV-doing reasonably well on Tivicay and Descovy with an undetectable viral load and a CD4 count is increased to 85  Continue ART, recheck labs in 2 months and follow up in 3 months  Stressed adherence  Will also continue the Bactrim until his CD4 count goes up at least above 100 for 1 year or if his CD4 count increased to at least 200 for 3 months     2  Mental retardation-followed closely by case management and behaviorist     3  Vitamin-D deficiency-vitamin-D replacement     4  Nicotine dependence-continue to stressed importance of complete tobacco cessation     5  Pre diabetes-diet control for now      7  Syphilis-remains serofast at 1:4    8  Dental pain-with very poor dentition  Refer to dental for management of multiple caries      Patient was provided medication, adherence and prevention education    Subjective:  Routine follow-up for HIV  Patient claims 100% adherence with Tivicay and Descovy    Patient denies any notable side effects  Overall the feeling well  The patient denies any fever chills or sweats, denies any nausea vomiting or diarrhea, denies any cough or shortness of breath  ROS:  A complete review of systems is negative other than that noted above in the subjective    Followup portions patient history reviewed and updated as: Allergies, current medications, past medical history, past social history, past surgical history, and the problem list    Objective:  Vitals:  Vitals:    10/05/22 1626   Pulse: 70   Temp: (!) 97 3 °F (36 3 °C)   TempSrc: Tympanic   SpO2: 97%   Weight: 86 2 kg (190 lb)       Physical Exam:   General Appearance:  Alert, interactive, appearing well,  nontoxic, no acute distress  Neck:   Supple without lymphadenopathy, no thyromegaly or masses   Throat: Oropharynx moist without lesions    Poor dentition    Lungs: Clear to auscultation bilaterally; no wheezes, rhonchi or rales; respirations unlabored   Heart:  RRR; no murmur, rub or gallop   Abdomen:   Soft, non-tender, non-distended, positive bowel sounds  Extremities: No clubbing, cyanosis or edema   Skin: No new rashes or lesions  No draining wounds noted  Labs, Imaging, & Other studies:   All pertinent labs and imaging studies were personally reviewed    Lab Results   Component Value Date    K 4 0 08/01/2022     08/01/2022    CO2 26 08/01/2022    BUN 10 08/01/2022    CREATININE 1 19 08/01/2022    GLUF 94 08/01/2022    CALCIUM 9 4 08/01/2022    AST 22 08/01/2022    ALT 22 08/01/2022    ALKPHOS 88 08/01/2022    EGFR 75 08/01/2022     Lab Results   Component Value Date    WBC 4 79 08/01/2022    WBC 4 6 08/01/2022    HGB 14 5 08/01/2022    HGB 13 1 08/01/2022    HCT 42 5 08/01/2022    HCT 38 5 08/01/2022    MCV 98 08/01/2022    MCV 97 08/01/2022     08/01/2022     08/01/2022     Lab Results   Component Value Date    HEPCAB Non-reactive 01/11/2022     Lab Results   Component Value Date    HAV Reactive (A) 06/15/2021    HEPCAB Non-reactive 01/11/2022     Lab Results   Component Value Date    RPR Reactive (A) 08/01/2022    RPR Reactive 4 dils (A) 08/01/2022     CD4 ABS   Date/Time Value Ref Range Status   08/01/2022 09:49 AM 85 (L) 359 - 1519 /uL Final     HIV-1 RNA by PCR, Qn   Date/Time Value Ref Range Status   08/01/2022 09:49 AM <20 copies/mL Final     Comment:     HIV-1 RNA not detected  The reportable range for this assay is 20 to 10,000,000  copies HIV-1 RNA/mL             Current Outpatient Medications:     acetaminophen (TYLENOL) 650 mg CR tablet, Take 1 tablet (650 mg total) by mouth every 8 (eight) hours as needed for mild pain, Disp: 30 tablet, Rfl: 0    albuterol (Ventolin HFA) 90 mcg/act inhaler, Inhale 2 puffs every 6 (six) hours as needed for wheezing, Disp: 18 g, Rfl: 2    Cholecalciferol 125 MCG (5000 UT) capsule, Take 1 capsule by mouth, Disp: , Rfl:     Descovy 200-25 MG tablet, TAKE 1 TABLET BY MOUTH IN THE MORNING, Disp: 90 tablet, Rfl: 0    Multiple Vitamin (Tab-A-María) TABS, TAKE 1 TABLET BY MOUTH IN THE MORNING, Disp: 90 tablet, Rfl: 1    sulfamethoxazole-trimethoprim (BACTRIM DS) 800-160 mg per tablet, TAKE 1 TABLET BY MOUTH IN THE MORNING, Disp: 30 tablet, Rfl: 5    Tivicay 50 MG TABS, TAKE 1 TABLET BY MOUTH IN THE MORNING, Disp: 90 tablet, Rfl: 0

## 2022-10-06 NOTE — PROGRESS NOTES
Assessment/Plan: Pt was approached by [facility] within ID clinic to explore emotional, cognitive, and behavioral health's stability  During today's brief contact, pt seemed lethargic, spaced out, pupils dilated, and at times perceived as catatonic  Verbal engagement was stimulated to observe responses which were delayed, yet pt seemed alert within responses  It was explored pt's social stability where pt identified been living with his mother, along with disclosing struggling with alleged food scarcity  It was explored pt's relationship status with SO, which pt described terminating the relationship due to "personal issues"  Pt proceeded to be guarded, and spaced out  Before session's completion, pt was encouraged to consider ongoing [facility] interventions, along with smoking cessation which pt didn't replied  No SI or HI were disclosed, nor displayed throughout contact  Community Health     Today patient present with   Chief Complaint   Patient presents with    Follow-up    HIV Positive     Patient would likely benefit from: Co-occurrent services  Smoking cessation counseling, and sources  Consider/focus/continue: Monitoring pt's emotional, cognitive, and behavioral health's stability  Stage of change: Pre-contemplation  Plan/ Behavioral Recommendations: Ongoing  interventions per pt's coordinated care, and/or pt's request of services  Diagnoses and all orders for this visit:    HIV disease ([facility] [address] )  -     CBC and differential; Future  -     Comprehensive metabolic panel; Future  -     HIV-1 RNA, quantitative, PCR; Future  -     T-helper cells (CD4) count;  Future    Need for influenza vaccination  -     influenza vaccine, quadrivalent, recombinant, PF, 0 5 mL, for patients 18 yr+ (FLUBLOK)    Need for HPV vaccination  -     HPV VACCINE 9 VALENT IM    Toxic effect of tobacco cigarette, undetermined intent, subsequent encounter    Mental impairment    Syphilis    Vitamin D deficiency    Poor dentition Discussion:     Patient can reach out to [facility] PR[facility] if they experiences changes in their behavioral health  Subjective:     Patient ID: Mary Quesada is a 40 y o  male  HPI    History of Present Illness:     Pt seemed impaired at the time of contact  Review of Systems      Objective:     Physical Exam      [facility]    Orientation     Person: yes    Place: yes    Time: yes    Appearance    Well Developed: yes healthy    Uncomfortable: no    Normal Body Odor: yes    Smells of Feces: no    Smells of Urine: no    Disheveled: no    Well Nourished: yes weight WNL of ideal    Grooming Unkempt: no    Poor Eye Contact: yes    Hirsute: yes    Looks Tired: yes    Acutely Exhausted: yesappears older    Mood and Affect:     Appropriate: no    Euthymicno    Irritable: no    Angry: no    Anxious: no    Depressed:no    Blunted:yes    Labile: no    Restricted: yes    Harm to Self or Others: No SI or Hi were disclosed, nor displayed throughout contact  Substance Abuse: Tobacco Use Disorder, Severe; Cannabis Use Disorder, Severe;  Extended Hx of Polysubstance Use Disorder (Uknown status)
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
## 2022-10-07 ENCOUNTER — PATIENT OUTREACH (OUTPATIENT)
Dept: SURGERY | Facility: CLINIC | Age: 42
End: 2022-10-07

## 2022-10-07 NOTE — PROGRESS NOTES
Ct was discussed with new CM  Ct shared his story with new CM  HC made sure that all information given to her was given to CM who will be taking over his case   Ct is still struggling with his social security and identity but has not gone to office to talk to anyone

## 2022-10-11 ENCOUNTER — PATIENT OUTREACH (OUTPATIENT)
Dept: SURGERY | Facility: CLINIC | Age: 42
End: 2022-10-11

## 2022-10-11 NOTE — PROGRESS NOTES
Ct was discussed in [facility]  [address] saw CT last week and Ct stated that all he eats is cookies and milk  CT mentioned not having food stamps  CM let [facility] team know that CT has had an issue with his identity for months and has not gone to social security to rectify  CM has applied for food stamps 3 times for CT   Ct needs to go to social security office and look into his stolen identity

## 2022-10-24 ENCOUNTER — PATIENT OUTREACH (OUTPATIENT)
Dept: SURGERY | Facility: CLINIC | Age: 42
End: 2022-10-24

## 2022-10-24 ENCOUNTER — APPOINTMENT (OUTPATIENT)
Dept: LAB | Facility: CLINIC | Age: 42
End: 2022-10-24
Payer: COMMERCIAL

## 2022-10-24 DIAGNOSIS — B20 HIV DISEASE (HCC): ICD-10-CM

## 2022-10-24 LAB
ALBUMIN SERPL BCP-MCNC: 3.9 G/DL (ref 3.5–5)
ALP SERPL-CCNC: 84 U/L (ref 46–116)
ALT SERPL W P-5'-P-CCNC: 20 U/L (ref 12–78)
ANION GAP SERPL CALCULATED.3IONS-SCNC: 3 MMOL/L (ref 4–13)
AST SERPL W P-5'-P-CCNC: 24 U/L (ref 5–45)
BASOPHILS # BLD AUTO: 0.04 THOUSANDS/ÂΜL (ref 0–0.1)
BASOPHILS NFR BLD AUTO: 1 % (ref 0–1)
BILIRUB SERPL-MCNC: 0.42 MG/DL (ref 0.2–1)
BUN SERPL-MCNC: 12 MG/DL (ref 5–25)
CALCIUM SERPL-MCNC: 9.8 MG/DL (ref 8.3–10.1)
CHLORIDE SERPL-SCNC: 108 MMOL/L (ref 96–108)
CO2 SERPL-SCNC: 27 MMOL/L (ref 21–32)
CREAT SERPL-MCNC: 1.29 MG/DL (ref 0.6–1.3)
EOSINOPHIL # BLD AUTO: 0.05 THOUSAND/ÂΜL (ref 0–0.61)
EOSINOPHIL NFR BLD AUTO: 1 % (ref 0–6)
ERYTHROCYTE [DISTWIDTH] IN BLOOD BY AUTOMATED COUNT: 13.1 % (ref 11.6–15.1)
GFR SERPL CREATININE-BSD FRML MDRD: 68 ML/MIN/1.73SQ M
GLUCOSE P FAST SERPL-MCNC: 78 MG/DL (ref 65–99)
HCT VFR BLD AUTO: 42.9 % (ref 36.5–49.3)
HGB BLD-MCNC: 14.7 G/DL (ref 12–17)
IMM GRANULOCYTES # BLD AUTO: 0.01 THOUSAND/UL (ref 0–0.2)
IMM GRANULOCYTES NFR BLD AUTO: 0 % (ref 0–2)
LYMPHOCYTES # BLD AUTO: 0.97 THOUSANDS/ÂΜL (ref 0.6–4.47)
LYMPHOCYTES NFR BLD AUTO: 26 % (ref 14–44)
MCH RBC QN AUTO: 33.7 PG (ref 26.8–34.3)
MCHC RBC AUTO-ENTMCNC: 34.3 G/DL (ref 31.4–37.4)
MCV RBC AUTO: 98 FL (ref 82–98)
MONOCYTES # BLD AUTO: 0.42 THOUSAND/ÂΜL (ref 0.17–1.22)
MONOCYTES NFR BLD AUTO: 11 % (ref 4–12)
NEUTROPHILS # BLD AUTO: 2.19 THOUSANDS/ÂΜL (ref 1.85–7.62)
NEUTS SEG NFR BLD AUTO: 61 % (ref 43–75)
NRBC BLD AUTO-RTO: 0 /100 WBCS
PLATELET # BLD AUTO: 198 THOUSANDS/UL (ref 149–390)
PMV BLD AUTO: 9.6 FL (ref 8.9–12.7)
POTASSIUM SERPL-SCNC: 4.6 MMOL/L (ref 3.5–5.3)
PROT SERPL-MCNC: 8.2 G/DL (ref 6.4–8.4)
RBC # BLD AUTO: 4.36 MILLION/UL (ref 3.88–5.62)
SODIUM SERPL-SCNC: 138 MMOL/L (ref 135–147)
WBC # BLD AUTO: 3.68 THOUSAND/UL (ref 4.31–10.16)

## 2022-10-24 PROCEDURE — 80053 COMPREHEN METABOLIC PANEL: CPT

## 2022-10-24 PROCEDURE — 86361 T CELL ABSOLUTE COUNT: CPT

## 2022-10-24 PROCEDURE — 87536 HIV-1 QUANT&REVRSE TRNSCRPJ: CPT

## 2022-10-24 PROCEDURE — 36415 COLL VENOUS BLD VENIPUNCTURE: CPT

## 2022-10-24 PROCEDURE — 85025 COMPLETE CBC W/AUTO DIFF WBC: CPT

## 2022-10-25 LAB
BASOPHILS # BLD AUTO: 0 X10E3/UL (ref 0–0.2)
BASOPHILS NFR BLD AUTO: 1 %
CD3+CD4+ CELLS # BLD: 96 /UL (ref 359–1519)
CD3+CD4+ CELLS NFR BLD: 9.6 % (ref 30.8–58.5)
EOSINOPHIL # BLD AUTO: 0.1 X10E3/UL (ref 0–0.4)
EOSINOPHIL NFR BLD AUTO: 1 %
ERYTHROCYTE [DISTWIDTH] IN BLOOD BY AUTOMATED COUNT: 13.1 % (ref 11.6–15.4)
HCT VFR BLD AUTO: 43.8 % (ref 37.5–51)
HGB BLD-MCNC: 15 G/DL (ref 13–17.7)
HIV1 RNA # SERPL NAA+PROBE: <20 COPIES/ML
HIV1 RNA SERPL NAA+PROBE-LOG#: NORMAL LOG10COPY/ML
IMM GRANULOCYTES # BLD: 0 X10E3/UL (ref 0–0.1)
IMM GRANULOCYTES NFR BLD: 0 %
LYMPHOCYTES # BLD AUTO: 1 X10E3/UL (ref 0.7–3.1)
LYMPHOCYTES NFR BLD AUTO: 27 %
MCH RBC QN AUTO: 33.7 PG (ref 26.6–33)
MCHC RBC AUTO-ENTMCNC: 34.2 G/DL (ref 31.5–35.7)
MCV RBC AUTO: 98 FL (ref 79–97)
MONOCYTES # BLD AUTO: 0.3 X10E3/UL (ref 0.1–0.9)
MONOCYTES NFR BLD AUTO: 10 %
NEUTROPHILS # BLD AUTO: 2.2 X10E3/UL (ref 1.4–7)
NEUTROPHILS NFR BLD AUTO: 61 %
PLATELET # BLD AUTO: 201 X10E3/UL (ref 150–450)
RBC # BLD AUTO: 4.45 X10E6/UL (ref 4.14–5.8)
WBC # BLD AUTO: 3.6 X10E3/UL (ref 3.4–10.8)

## 2022-10-25 NOTE — PROGRESS NOTES
Clt called DEVIN, reported receive medical bill from [address], does not why since he has medical insurance  Clt reports has comprehension issues, asked CM for assistance contacting [facility]'s billing dept to inquire  CM called [facility]'s billing dept, discussed issue  CM told medical chart missing insurance information which as updated and issue resolved  No further action required  CM called Clt, provided update
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
## 2022-11-01 ENCOUNTER — PATIENT OUTREACH (OUTPATIENT)
Dept: SURGERY | Facility: CLINIC | Age: 42
End: 2022-11-01

## 2022-11-02 ENCOUNTER — OFFICE VISIT (OUTPATIENT)
Dept: SURGERY | Facility: CLINIC | Age: 42
End: 2022-11-02

## 2022-11-02 ENCOUNTER — DOCUMENTATION (OUTPATIENT)
Dept: SURGERY | Facility: CLINIC | Age: 42
End: 2022-11-02

## 2022-11-02 ENCOUNTER — TREATMENT (OUTPATIENT)
Dept: SURGERY | Facility: CLINIC | Age: 42
End: 2022-11-02

## 2022-11-02 ENCOUNTER — PATIENT OUTREACH (OUTPATIENT)
Dept: SURGERY | Facility: CLINIC | Age: 42
End: 2022-11-02

## 2022-11-02 VITALS
TEMPERATURE: 96.7 F | DIASTOLIC BLOOD PRESSURE: 62 MMHG | SYSTOLIC BLOOD PRESSURE: 129 MMHG | OXYGEN SATURATION: 99 % | WEIGHT: 186.2 LBS | HEIGHT: 75 IN | RESPIRATION RATE: 18 BRPM | BODY MASS INDEX: 23.15 KG/M2 | HEART RATE: 68 BPM

## 2022-11-02 DIAGNOSIS — B20 CURRENTLY ASYMPTOMATIC HIV INFECTION, WITH HISTORY OF HIV-RELATED ILLNESS (HCC): Primary | ICD-10-CM

## 2022-11-02 DIAGNOSIS — T65.222D TOXIC EFFECT OF TOBACCO CIGARETTE, INTENTIONAL SELF-HARM, SUBSEQUENT ENCOUNTER: ICD-10-CM

## 2022-11-02 DIAGNOSIS — F41.9 ANXIETY: ICD-10-CM

## 2022-11-02 DIAGNOSIS — R63.4 WEIGHT LOSS: ICD-10-CM

## 2022-11-02 DIAGNOSIS — K06.1 GUM HYPERPLASIA: ICD-10-CM

## 2022-11-02 DIAGNOSIS — F40.298 FEAR OF DEATH: ICD-10-CM

## 2022-11-02 DIAGNOSIS — K08.9 POOR DENTITION: ICD-10-CM

## 2022-11-02 DIAGNOSIS — F12.10 MARIJUANA ABUSE: ICD-10-CM

## 2022-11-02 DIAGNOSIS — F32.89 OTHER DEPRESSION: ICD-10-CM

## 2022-11-02 DIAGNOSIS — F43.10 PTSD (POST-TRAUMATIC STRESS DISORDER): ICD-10-CM

## 2022-11-02 PROBLEM — Z29.89 NEED FOR PNEUMOCYSTIS PROPHYLAXIS: Status: RESOLVED | Noted: 2021-06-24 | Resolved: 2022-11-02

## 2022-11-02 PROBLEM — Z29.8 NEED FOR PNEUMOCYSTIS PROPHYLAXIS: Status: RESOLVED | Noted: 2021-06-24 | Resolved: 2022-11-02

## 2022-11-02 RX ORDER — CHLORHEXIDINE GLUCONATE 0.12 MG/ML
15 RINSE ORAL 2 TIMES DAILY
Qty: 120 ML | Refills: 0 | Status: SHIPPED | OUTPATIENT
Start: 2022-11-02

## 2022-11-02 RX ORDER — BUPROPION HYDROCHLORIDE 150 MG/1
150 TABLET, EXTENDED RELEASE ORAL 2 TIMES DAILY
Qty: 60 TABLET | Refills: 2 | Status: SHIPPED | OUTPATIENT
Start: 2022-11-02

## 2022-11-02 RX ORDER — AMOXICILLIN AND CLAVULANATE POTASSIUM 875; 125 MG/1; MG/1
1 TABLET, FILM COATED ORAL EVERY 12 HOURS SCHEDULED
Qty: 20 TABLET | Refills: 0 | Status: SHIPPED | OUTPATIENT
Start: 2022-11-02 | End: 2022-11-12

## 2022-11-02 NOTE — PROGRESS NOTES
Assessment/Plan: A brief case consultation was provided by pt's assigned PCP to address current concerns disclosed by pt  PCP completed both PHQ-9 and SUMMER screening, scoring 14 and 13 displaying mild to severe depressive and anxiety traits at the time  Pt disclosed upon PCP's session that he has been struggling with food scarcity since apparently his food stamps benefits were cancelled due to identity theft, along with smoking cannabis to help him self-regulate  PCP shared pt's interest towards smoking cessation sources which will be px (no nicotine patches, nor gum)  Pt was approached by [facility] to explore emotional, cognitive, and behavioral health's stability  Pt responded in anger disclosing that "no one cares, the government doesn't want to help"  Pt proceeded to share that he has been consuming cannabis to avoid committing suicide  A brief verbal assessment was developed to explore SI and HI, which pt shared not wanting to kill himself, expressing his frustrations towards his inability to acquire suitable employment due to his criminal record, along with not having food stamps to sustain himself, relying on his mother's benefits per pt's disclosure  Pt continued to express his frustrations of people "not understanding his issues, and not being helped"  It was explored pt's willingness to be admitted to a residential co-occurrent program, or an IOP to address both MH and substance use difficulties which pt answered in Turkish the following: I don't need none of that, I'm good  Before session's completion it was mentioned to pt that his current disclosed difficulties will be addressed with assigned CM Ashley Dye to continue reviewing assertive ways to provide support  No SI or HI were disclosed, nor displayed, yet helplessness and hopelessness were manifested throughout conversation       A brief case consultation was developed with pt's assigned CM addressing pt's codependency issues, and pt's reluctance to consider Tx suggestions, and extension of support  Critical access hospital     Today patient present with No chief complaint on file  Patient would likely benefit from: Completing a psychiatric evaluation to address co-occurrence; IOP or co-occurrent residential Tx  Consider/focus/continue: Addressing  Codependency Issues, PTSD Traits, and Co-occurrence    Stage of change: Pre-contemplation  Plan/ Behavioral Recommendations: Ongoing BH interventions per pt's coordinated care, and/or pt's request of services  There are no diagnoses linked to this encounter  Discussion:     Patient can reach out to [facility] if they experiences changes in their behavioral health  Subjective:     Patient ID: Rios Murphy is a 40 y o  male  HPI    History of Present Illness: The patient is seeing the Harlan Barlow [address] [place] [address] today for a routine behavioral health follow up  Review of Systems      Objective:     Physical Exam      [facility]    Orientation     Person: yes    Place: yes    Time: yes    Appearance    Well Developed: yes healthy    Uncomfortable: yes    Normal Body Odor: yes    Smells of Feces: no    Smells of Urine: no    Disheveled: no    Well Nourished: yes weight WNL of ideal    Grooming Unkempt: no    Poor Eye Contact: yes    Hirsute: yes    Looks Tired: yes    Acutely Exhausted: noappears older    Mood and Affect:     Appropriate: no    Euthymicyes    Irritable: yes    Angry: yes    Anxious: yes    Depressed:yes    Blunted:no    Labile: no    Restricted: no    Harm to Self or Others: No SI  Or HI were disclosed, nor displayed yet helplessness and hopelessness manifested throughout contact  Substance Abuse: Cannabis Use Disorder, Extended Hx of Polysubstance Use Disorder (Unknown Status) since adolescence; Tobacco Use Disorder

## 2022-11-02 NOTE — ASSESSMENT & PLAN NOTE
Continue to smoke 5 per day  Pt does wish he could stop smoking  · Stressed the importance of 100% smoking cessation  · Nicotine patches/gum are not an option since pt developed a rash  · Referral to [facility]   · Will start Wellbutrin 150 mg q 12 hrs  Nicotine Dependency - Primary    Counseled for greater than 15 minutes on the importance of smoking cessation  Education was given regarding the cardiovascular effects of how nicotine affects the heart, lungs, kidneys, and peripheral vascular system    Referred to Harlan Barlow  [address] [place] [address] for enrollment in smoking cessation program

## 2022-11-02 NOTE — ASSESSMENT & PLAN NOTE
PHQ-9: 14 moderate depression  No SI or HI    · Will start Wellbutrin 150 mg q 12 hrs  · Discussed black box warning  · Discussed SE  · Discussed call office if he is feeling worse  · Follow 2 weeks  · Referral to psychiatry  · Follow with [facility]

## 2022-11-02 NOTE — ASSESSMENT & PLAN NOTE
Reports dental pain  Poor dental habits    Teeth and gum were painful to palpation, reddened, and swollen  DDx; dental abscess, gum infection, or tooth decay  · Gum hyperplasia, food noted between teeth  · Worn down teeth  · B/L lower 1st molar have cavities along with exposed roots/decay  · Referral to dental/OMS  · Tylenol for pain  · Chlorhexidine mouth wash  · Follow up 2 weeks   · Amoxicillin/clavulante 875-125 mg q 12 X 10 days

## 2022-11-02 NOTE — ASSESSMENT & PLAN NOTE
Pt cries every day and tries to do the right thing  Pt reports death is in his mind  Pt is worried about what he will die from and when this happen  Suspect pt is suffering from PTSD and keeps reliving his brother's death  No SI or HI      · Discussed call office if he is feeling worse  · Follow 2 weeks  · Referral to psychiatry  · Follow with [facility]

## 2022-11-02 NOTE — ASSESSMENT & PLAN NOTE
Does not like the way he looks    BMI: 23 27  · Offered Food Pantry options  · Offer food box from [employer]  · Follow up with Gage Mcgee  · Educated pt on current weight is healthy

## 2022-11-02 NOTE — ASSESSMENT & PLAN NOTE
Doing well on Tivicay and Descovy with an undetectable VL  Pt reports 100% medication compliance on HAART  Stressed the importance of 100% medication adherence  HIV Counseling:    Viral Load: < 20    CD4 Count: 96      Denies side effects  Stressed the importance of adherence  Continue follow up in the ID clinic with Dr Shraddha Hodges or Dr Annita Ward  Reviewed the most recent labs, including the CD4 and viral load  Discussed the risks and benefits of treatment options, instructions for management, importance of treatment adherence, and reduction of risk factors  Educated on possible medication side effects  Counseled on routes of HIV transmission, including the risk of  infection  Emphasized that viral suppression is the best method to prevent HIV transmission  At this time the pt denies the need for HIV testing of anyone in their life  Total encounter time was greater than 35 minutes  Greater than 20 minutes were spent on counseling and patient education  Pt voices understanding and agreement with treatment plan
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
## 2022-11-02 NOTE — ASSESSMENT & PLAN NOTE
Experiencing a lot of anxiety lately  SUMMER 7: 15  Pt has seen his brother die through suicide by hanging himself and was dx with depression     · Smokes marijuana to help deal with anxiety  · Discussed using [facility]: pt declined  · Discussed referral to psychiatry

## 2022-11-03 ENCOUNTER — PATIENT OUTREACH (OUTPATIENT)
Dept: SURGERY | Facility: CLINIC | Age: 42
End: 2022-11-03

## 2022-11-03 NOTE — PROGRESS NOTES
CM met with PCP and discussed member's need for dental work, referral     CM met with RD and discussed client's SNAP case status/food insecurity/needs  CM met with client during office visit, reports using marijuana daily to cope with symptoms of depression/anxiety, met with Behavioral Health Specialist but declined service  Identity theft issues discussed, Client reports visited IRS office but was unable to get an update on case status  CM explained to Client that he needs to address identity theft issues with IRS in order to proof to [facility] office he is eligible for benefits  Member verbalized understanding, will reach out to IRS office tomorrow and inquire further, will provided CM with update  CM case conference with [facility] Specialist and discussed member's mental health status, marijuana use, and barriers to care

## 2022-11-07 ENCOUNTER — TELEPHONE (OUTPATIENT)
Dept: PSYCHIATRY | Facility: CLINIC | Age: 42
End: 2022-11-07

## 2022-11-07 NOTE — PROGRESS NOTES
CM called Client to follow up  Client reports called IRS office to follow up with Identity Theft [employer] but there was no response  Client also reports reached out to [facility] office to inquire as well but unable to reach anyone  Client agreed to reach out to IRS office again and report outcome

## 2022-11-08 ENCOUNTER — PATIENT OUTREACH (OUTPATIENT)
Dept: SURGERY | Facility: CLINIC | Age: 42
End: 2022-11-08

## 2022-11-09 ENCOUNTER — PATIENT OUTREACH (OUTPATIENT)
Dept: SURGERY | Facility: CLINIC | Age: 42
End: 2022-11-09

## 2022-11-10 ENCOUNTER — PATIENT OUTREACH (OUTPATIENT)
Dept: SURGERY | Facility: CLINIC | Age: 42
End: 2022-11-10

## 2022-11-10 PROBLEM — Z01.20 DENTAL EXAMINATION: Status: ACTIVE | Noted: 2022-11-07

## 2022-11-10 NOTE — PROGRESS NOTES
CM called Client and provided dental appt information scheduled for April 21, 2023 at 11:30 AM, located at [address] [address], 2nd floor, [address]  Identity theft issues discussed as it relates to SNAP benefits  Client reports received notice in the mail re: IRS identity theft affidavit form, will visit CM tomorrow to review letter  CM spoke to Client's mother Sarah Talley) and discussed member's challenges in addressing SNAP issue due to cognitive limitations  Mother acknowledged Client's challenges, agreed to speak to Client and assist in any way possible  Client agreed to visit CM tomorrow at 10:30 AM to review letter received

## 2022-11-10 NOTE — PROGRESS NOTES
CM called [phone] and coordinated new appt for April 21, 2023 at 11:30 AM, located at [address] [address], [address] [place] [address], [address]

## 2022-11-10 NOTE — PROGRESS NOTES
CM met with Client during office  CM reviewed letter brought by Client from [place] Dept of [employer] informing Client of [address] TO COMPENSATION PAYABLE (see attachment)  CM also assisted Client by contacting the Employer ([employer]) and Insurer ([employer]) stated on the Dept of Labor notice and reported the identity theft issue  Insurer agreed to file a report for fraud and someone will contact CM within 24 hours for update on report  Message left with Employer requesting call back  CM encouraged member to visit Police Dept and file identity theft report by showing copy of letter and SNAP case denial  CM called member's mother and step-dad and discuss need to file police report  Step-dad agreed to escort Client to police dept tomorrow and file police report  CM will be able to assist Client apply for SNAP and reinstate SSI benefits once copy of police report is provided  CM informed Client of scheduled dental appointment for April 21 at 11:30 AM at the [facility]  Client expressed interest in applying for the medical marijuana program  CM agreed to inquire with PCP as to referral process      CM Case Conference with PCP via e-mail, provided update on dental appt and discussed possible referral to Medical Marijuana program

## 2022-11-11 ENCOUNTER — PATIENT OUTREACH (OUTPATIENT)
Dept: SURGERY | Facility: CLINIC | Age: 42
End: 2022-11-11

## 2022-11-14 ENCOUNTER — PATIENT OUTREACH (OUTPATIENT)
Dept: SURGERY | Facility: CLINIC | Age: 42
End: 2022-11-14

## 2022-11-14 NOTE — PROGRESS NOTES
CM met with Clt during office visit  Clt reports visited Police Dept with his step-dad and filed identity theft police report  Clt told police report will be available after 7 business days for   Report number provided: [id number]  Clt agreed to reach out to Police Dept, obtain copy of report and provide copy of this CM  Psych treatment history discussed  Clt report he was homeless for a while after being released from long term in [place], [place]  Clt enrolled in a program called [facility] and another called [facility]  CM called above named agencies and inquired as to how to request medical information  CM encouraged to fax medical consent form  Clt expressed interest in employment, understands challenges to employment due to his cognitive limitations  CM provided Clt with information on Office of [employer] [facility] [employer] and how they can assist with job search, hiring process, resume building and training  Member expressed interest  CM agreed to assist with referral once Psych records is obtained  Clt signed CM/Clt agreement/Grievance Policy and Medical Consent form

## 2022-11-14 NOTE — PROGRESS NOTES
CM faxed member's sign medical consent form to [facility] requesting copy of mental health, psychological and psychiatric evaluations  Fax confirmation page received

## 2022-11-16 ENCOUNTER — PATIENT OUTREACH (OUTPATIENT)
Dept: SURGERY | Facility: CLINIC | Age: 42
End: 2022-11-16

## 2022-11-17 ENCOUNTER — PATIENT OUTREACH (OUTPATIENT)
Dept: SURGERY | Facility: CLINIC | Age: 42
End: 2022-11-17

## 2022-11-17 NOTE — PROGRESS NOTES
CM called Client to follow up with identity theft police report  Client reports has not visited the police dept to  a copy of the police report, asked CM for assistance calling to inquire if it can be picked up today  CM called the [employer] () and spoke to reception who stated client needs to complete and submit a "Right to know" form in person, pay the fee and report will be mailed to Client within 5 business days  Client agreed to meet with this CM tomorrow to complete and sign the "Right to know" form  Client also reports will adverse side effects from psych meds  CM encouraged Client to talk to PCP about issue, CM transferred Client to Clinic

## 2022-11-17 NOTE — PROGRESS NOTES
CM met with Client during office visit  CM assisted Client complete "Right to Know" form, provided instructions on how to submit and request police report at the [facility] located at [facility], [place], [facility] [address]  Client verbalized understanding, agreed to visit Police dept later today and report outcome

## 2022-11-21 ENCOUNTER — PATIENT OUTREACH (OUTPATIENT)
Dept: SURGERY | Facility: CLINIC | Age: 42
End: 2022-11-21

## 2022-11-22 ENCOUNTER — PATIENT OUTREACH (OUTPATIENT)
Dept: SURGERY | Facility: CLINIC | Age: 42
End: 2022-11-22

## 2022-11-22 NOTE — PROGRESS NOTES
CM received copy of client's medical records from [facility] wellness services via fax

## 2022-11-28 ENCOUNTER — PATIENT OUTREACH (OUTPATIENT)
Dept: SURGERY | Facility: CLINIC | Age: 42
End: 2022-11-28

## 2022-11-28 NOTE — PROGRESS NOTES
Clt called CM to report has not received police report re: identity theft  CM encouraged Clt to be patient and wait until next Monday for report to arrive, if not, CM will assist Clt calling police dept to follow up with request      Clt reports interest vocational service for pre/employment and training  CM and Clt had brief discussion on his cognitive limitations and how the office for vocational rehabilitation services can assist with training and employemnt  CM agreed to assist with referral to vocational/rehab program once police report is received and SNAP/SSI case is reinstated  Clt verbalized understanding

## 2022-11-29 ENCOUNTER — PATIENT OUTREACH (OUTPATIENT)
Dept: SURGERY | Facility: CLINIC | Age: 42
End: 2022-11-29

## 2022-11-29 ENCOUNTER — DOCUMENTATION (OUTPATIENT)
Dept: SURGERY | Facility: CLINIC | Age: 42
End: 2022-11-29

## 2022-11-29 ENCOUNTER — OFFICE VISIT (OUTPATIENT)
Dept: SURGERY | Facility: CLINIC | Age: 42
End: 2022-11-29

## 2022-11-29 VITALS
BODY MASS INDEX: 22.88 KG/M2 | OXYGEN SATURATION: 99 % | RESPIRATION RATE: 18 BRPM | SYSTOLIC BLOOD PRESSURE: 117 MMHG | WEIGHT: 184 LBS | HEART RATE: 63 BPM | HEIGHT: 75 IN | TEMPERATURE: 98.7 F | DIASTOLIC BLOOD PRESSURE: 63 MMHG

## 2022-11-29 DIAGNOSIS — K08.9 POOR DENTITION: ICD-10-CM

## 2022-11-29 DIAGNOSIS — R63.4 WEIGHT LOSS: ICD-10-CM

## 2022-11-29 DIAGNOSIS — F40.298 FEAR OF DEATH: ICD-10-CM

## 2022-11-29 DIAGNOSIS — F34.1 PERSISTENT DEPRESSIVE DISORDER: ICD-10-CM

## 2022-11-29 DIAGNOSIS — F41.9 ANXIETY: ICD-10-CM

## 2022-11-29 DIAGNOSIS — T65.222D TOXIC EFFECT OF TOBACCO CIGARETTE, INTENTIONAL SELF-HARM, SUBSEQUENT ENCOUNTER: ICD-10-CM

## 2022-11-29 DIAGNOSIS — K06.1 GUM HYPERPLASIA: ICD-10-CM

## 2022-11-29 DIAGNOSIS — B20 CURRENTLY ASYMPTOMATIC HIV INFECTION, WITH HISTORY OF HIV-RELATED ILLNESS (HCC): Primary | ICD-10-CM

## 2022-11-29 RX ORDER — CHLORHEXIDINE GLUCONATE 0.12 MG/ML
15 RINSE ORAL 2 TIMES DAILY
Qty: 120 ML | Refills: 0 | Status: SHIPPED | OUTPATIENT
Start: 2022-11-29

## 2022-11-29 NOTE — ASSESSMENT & PLAN NOTE
No signs of infection      · Completed Augmentin   · Reordered Chlorhexidine mouth wash  · Follow up with Dental

## 2022-11-29 NOTE — PROGRESS NOTES
CM called Clt to follow up with "Right to Know" request for police report  Clt reports has not received report in the mail, would like to meet with CM tomorrow after PCP appointment for assistance contacting police dept to follow up with request      Client also reports food insecurity  CM encouraged Clt to speak with Nutritionist during clinic visit for food referral  Clt agreed

## 2022-11-29 NOTE — ASSESSMENT & PLAN NOTE
· Referral to psychiatrist  · Follow with [facility]  · Discussed reasons to call [phone]

## 2022-11-29 NOTE — ASSESSMENT & PLAN NOTE
Wishes to stop smoking  Did not tolerate Wellbutrin due to SE's  Pt is allergic to nicotine patches and gum  · Continue to stress the importance of 100% smoking cessation  · [facility] for smoking cessation  Nicotine Dependency - Primary    Counseled for greater than 15 minutes on the importance of smoking cessation  Education was given regarding the cardiovascular effects of how nicotine affects the heart, lungs, kidneys, and peripheral vascular system    Referred to Harlan Barlow  [address] [place] [address] for enrollment in smoking cessation program

## 2022-11-29 NOTE — PROGRESS NOTES
Assessment/Plan:    Currently asymptomatic HIV infection, with history of HIV-related illness ([address] )  Doing well on Descovy and Tivicay with an undetectable VL  Pt reports 100% medication compliance on HAART  Stressed the importance of 100% medication adherence  HIV Counseling:    Viral Load: < 20    CD4 Count: 96      Denies side effects  Stressed the importance of adherence  Continue follow up in the ID clinic with Dr Angela Johnson or Dr Javid Morton  Reviewed the most recent labs, including the CD4 and viral load  Discussed the risks and benefits of treatment options, instructions for management, importance of treatment adherence, and reduction of risk factors  Educated on possible medication side effects  Counseled on routes of HIV transmission, including the risk of  infection  Emphasized that viral suppression is the best method to prevent HIV transmission  At this time the pt denies the need for HIV testing of anyone in their life  Total encounter time was greater than 35 minutes  Greater than 20 minutes were spent on counseling and patient education  Pt voices understanding and agreement with treatment plan  Fear of death    · Referral to psychiatrist  · Follow with [facility]  · Discussed reasons to call 911     Anxiety    · Referral to psychiatrist  · Follow with [facility]  · Discussed reasons to call 911     Depression    · Referral to psychiatrist  · Follow with [facility]  · Discussed reasons to call 911     Poor dentition  No signs of infection  · Completed Augmentin   · Reordered Chlorhexidine mouth wash  · Follow up with Dental     Toxic effect of tobacco cigarette  Wishes to stop smoking  Did not tolerate Wellbutrin due to SE's  Pt is allergic to nicotine patches and gum    · Continue to stress the importance of 100% smoking cessation  · Follow with [facility] for smoking cessation  Nicotine Dependency - Primary    Counseled for greater than 15 minutes on the importance of smoking cessation  Education was given regarding the cardiovascular effects of how nicotine affects the heart, lungs, kidneys, and peripheral vascular system  Referred to [facility] for enrollment in smoking cessation program       Weight loss  Worried about how he looks since he can see his bone  Pt does play basketball and goes to the park to exercise  BMI: 23 0  Max weight 190 lb now 184  Pt has been at this weight previously with lowest weight 174 lb  Will hold off on work up for now and monitor weight  · Follow with [place] dietician  · 4 week f/u for weight management  · Awaiting food stamp approval  · Give box from food pantry       Diagnoses and all orders for this visit:    Currently asymptomatic HIV infection, with history of HIV-related illness ([facility] [address] )    Persistent depressive disorder  -     Ambulatory referral to Psychiatry; Future    Fear of death  -     Ambulatory referral to Psychiatry; Future    Anxiety  -     Ambulatory referral to Psychiatry; Future    Poor dentition  -     chlorhexidine (PERIDEX) 0 12 % solution; Apply 15 mL to the mouth or throat 2 (two) times a day    Gum hyperplasia  -     chlorhexidine (PERIDEX) 0 12 % solution;  Apply 15 mL to the mouth or throat 2 (two) times a day    Toxic effect of tobacco cigarette, intentional self-harm, subsequent encounter    Weight loss        Patient Active Problem List   Diagnosis   • Currently asymptomatic HIV infection, with history of HIV-related illness ([facility] [address] )   • Toxic effect of tobacco cigarette   • Wheezing without diagnosis of asthma   • Depression   • Anxiety   • Mental impairment   • Syphilis   • Thrush, oral   • Weight loss   • Prediabetes   • Sublux of proximal interphaln joint of l mid finger, sequela   • Vitamin D deficiency   • Cognitive impairment   • Fear of death   • Marijuana abuse   • Poor dentition   • PTSD (post-traumatic stress disorder)   • Dental examination     Lab Results   Component Value Date    K 4 6 10/24/2022     10/24/2022    CO2 27 10/24/2022    BUN 12 10/24/2022    CREATININE 1 29 10/24/2022    GLUF 78 10/24/2022    CALCIUM 9 8 10/24/2022    AST 24 10/24/2022    ALT 20 10/24/2022    ALKPHOS 84 10/24/2022    EGFR 68 10/24/2022     Lab Results   Component Value Date    WBC 3 68 (L) 10/24/2022    WBC 3 6 10/24/2022    HGB 14 7 10/24/2022    HGB 15 0 10/24/2022    HCT 42 9 10/24/2022    HCT 43 8 10/24/2022    MCV 98 10/24/2022    MCV 98 (H) 10/24/2022     10/24/2022     10/24/2022          Subjective:      Patient ID: Loree Wong is a 40 y o  male  HPI  Pt presents for 4 week follow for management of HIV, anxiety, and depression  Pt was started on Wellbutrin 150 mg BID but did not tolerate the side effects due to hearing voices in his head and wanting to harm himself  Pt stopped medication 2 weeks ago and reports and the thoughts have gone  Denies any SI or HI  Pt reports is tired and can't eat  Pt sees himself looking bony and worried about weight loss  Pt is trying to eat everything and is not gaining weight  Pt reports eating all  food and any food he can't get  Pt does odds jobs to pay for food  Pt reports having some trouble with food securities and has used the food bank  Pt is working with [employer] to get new identification cards since pt does not have access to food stamps  The following portions of the patient's history were reviewed and updated as appropriate: allergies, current medications, past medical history, past social history and problem list     Review of Systems   Constitutional: Negative  Respiratory: Negative  Cardiovascular: Negative  Musculoskeletal: Negative  Neurological: Negative  Psychiatric/Behavioral: Negative            Objective:      /63 (BP Location: Left arm, Patient Position: Sitting, Cuff Size: Standard)   Pulse 63   Temp 98 7 °F (37 1 °C)   Resp 18   Ht 6' 3" (1 905 m)   Wt 83 5 kg (184 lb)   SpO2 99%   BMI 23 00 kg/m²          Physical Exam  Vitals and nursing note reviewed  Constitutional:       General: He is not in acute distress  Appearance: Normal appearance  He is not ill-appearing  HENT:      Mouth/Throat:      Dentition: Abnormal dentition  Gingival swelling and dental caries present  Cardiovascular:      Rate and Rhythm: Normal rate and regular rhythm  Chest Wall: PMI is not displaced  Pulses: Normal pulses  Heart sounds: Normal heart sounds  Musculoskeletal:      Comments: Pt reports lower back pain in past but was not present on exam or reproducible on exam     Neurological:      Mental Status: He is alert  Psychiatric:         Mood and Affect: Mood normal          Speech: Speech normal          Behavior: Behavior normal          Thought Content: Thought content does not include homicidal or suicidal ideation  Thought content does not include homicidal or suicidal plan

## 2022-11-29 NOTE — ASSESSMENT & PLAN NOTE
· Referral to psychiatrist  · Follow with [facility]  · Discussed reasons to call [phone]

## 2022-11-29 NOTE — PROGRESS NOTES
Monitoring/evaluation (cont  From 11/2/22):  1  Food/nutrition intake   -Pt reported continued fluctuating appetite  He admits that appetite r/t emotional distress  -Ate 3 eggs and bread for breakfast today, and still felt hungry     -Also mentioned eating a lot of peanut butter, and drinks ~2 c milk per day    2  Wt  Wt Readings from Last 3 Encounters:   11/29/22 83 5 kg (184 lb)   11/02/22 84 5 kg (186 lb 3 2 oz)   10/05/22 86 2 kg (190 lb)     2# weight loss x1 mo   BMI 23 00  Was 184# in March, no 3 or 6 month weights on file  3   Knowledge/beliefs/attitudes   -Pt feels that he looks too thin  Some temporal fat loss observed  -Pt reported trying to eat throughout the day  -Recommend high kcal/protein foods    4   Factors affecting access to food    -Pt admits to feelings of stress, wanting to find a job, and get his SNAP reinstated  -Pt smoking a lot of weed due to stress   -Pt reported not having any food at home     -Working with CM to get SNAP back since having ID stolen   -Working with [employer] for job applications   Food Insecurity: Food Insecurity Present   • Worried About [address] in the Last Year: Sometimes true   • [address] in the Last Year: Sometimes true             Nutrition Diagnosis (cont/ from 11/2)    Problem: Unitended weight loss    Related to: Decreased appetite     As Evidenced By: Weight Loss and Patient Interview     Nutrition intervention/recommendations  Nutrition Education Intervention: Reinforced prior education  Person Educated: patient  Topics Discussed: Appetite, diet, access to food   Teaching Method: Verbal; needs reinforcement  Comprehension: poor   Receptivity: fair   Expected compliance: fair   Collaboration and referral of nutrition care: CM, Emergency Food Box, [facility] food pantry     Goals  Goal #1 Avoid weight loss    Not met, continue working on goal    -Darlenegrrebecca Lloyd to f/u with CM for [employer] application, and local food pantry    -[employer] to consume small meals throughout the day  -Consider oral nutrition supplement should weight loss progress    Monitoring and evaluation  Continue previous  Visit Summary    Arleen Aguilar is a 40 y o  male seen by RD in conjunction with PCP appt; PCP advised RD that pt has lost weight, screened positive for food insecurity  Arleen Aguilar reported having poor appetite, having limited food at home  Pt reported feeling hungry after eating 3 eggs and bread at breakfast today  RD encouraged small meals throughout the day, encouraged adequate kcal/protien intake  Arleen Aguilar accepted emergency food box  Was provided with contact info for [employer]  RD f/u with CM, CM to meet with ct afterwards  Continue f/u with pt in conjunction with clinic appts        Jaye Bryant, MS, RD, LDN

## 2022-11-29 NOTE — ASSESSMENT & PLAN NOTE
Worried about how he looks since he can see his bone  Pt does play basketball and goes to the park to exercise  BMI: 23 0  Max weight 190 lb now 184  Pt has been at this weight previously with lowest weight 174 lb  Will hold off on work up for now and monitor weight    · Follow with [place]  · 4 week f/u for weight management  · Awaiting food stamp approval  · Give box from food pantry

## 2022-11-29 NOTE — PROGRESS NOTES
CM case conference with Nutritionist who reported met with client, discussed food insecurity and provided food box  Moments later CM met with Client, discussed need food needs and assistance reinstating SNAP/SSI benefits  Client reports has not received police reports in the mail  CM called [employer], spoke to vero who agreed to e-mail copy of report to Client at [email or web address]  CM explained to Client police report is needed in order to reinstate SNAP/SSI benefits  Client verbalized understanding  Client's interest in pre/vocation services for part-time employment discussed  CM assisted Client with Office of [facility] (OVR) referral by submitting OVR application at [employer]  Client medical consent form  Participant ID assingned: [id number]  Client informed will receive call from [address] [place] [address] office to complete assessment  Client reports does not have cell phone  CM assisted in submitting assurance wireless application online  Client will received application status response via mail      Client agreed to inform CM once police report is received in order to follow up with SNAP/SSI goal

## 2022-11-29 NOTE — ASSESSMENT & PLAN NOTE
Doing well on Descovy and Tivicay with an undetectable VL  Pt reports 100% medication compliance on HAART  Stressed the importance of 100% medication adherence  HIV Counseling:    Viral Load: < 20    CD4 Count: 96      Denies side effects  Stressed the importance of adherence  Continue follow up in the ID clinic with Dr Emelyn Morgan or Dr Afshan Coy  Reviewed the most recent labs, including the CD4 and viral load  Discussed the risks and benefits of treatment options, instructions for management, importance of treatment adherence, and reduction of risk factors  Educated on possible medication side effects  Counseled on routes of HIV transmission, including the risk of  infection  Emphasized that viral suppression is the best method to prevent HIV transmission  At this time the pt denies the need for HIV testing of anyone in their life  Total encounter time was greater than 35 minutes  Greater than 20 minutes were spent on counseling and patient education  Pt voices understanding and agreement with treatment plan

## 2022-12-01 ENCOUNTER — PATIENT OUTREACH (OUTPATIENT)
Dept: SURGERY | Facility: CLINIC | Age: 42
End: 2022-12-01

## 2022-12-07 NOTE — PROGRESS NOTES
Client called CM to report police report has not been received  CM called [employer] and inquired re: Right to know request  CM told will receive report via e-mail  Moments later Police report received by Client via e-email and sent copy to this CM  (see attachment)  CM assisted Client apply for SNAP benefits via [facility] website and confirmation page received (see attachment)  CM also assisted Client submitting ID Theft Notice through [place]'s Unemployment Compensation benefits system  CM encouraged Client to contact [facility] office, inform them of identity theft police report and to have his benefits reinstated  Client agreed to do so and report outcome  Client reports received call from [employer] office, completed initial phone interview and was encouraged to visit the [facility] office located at [address] [place], [place]  Client will asked step-father to drive him to [employer] office to continue with service enrollment, will report outcome

## 2022-12-08 ENCOUNTER — PATIENT OUTREACH (OUTPATIENT)
Dept: SURGERY | Facility: CLINIC | Age: 42
End: 2022-12-08

## 2022-12-08 NOTE — PROGRESS NOTES
CM called Client, discussed SNAP case status  Client reports received call from [facility] worker last Friday, told his case was approved and received $281 in food stamp  Client was able to buy some groceries  SSI case discussed  Client reports has not called [employer] to inform of identity theft police report and request to reinstate benefits, Client's cognitive impairment is a barrier  CM tried to talk to Client's mother to provide instructions but was not available  OVR referral discussed  Client reports has not visited [address] [place] [address] in [facility] visit with step-father's help and report outcome  CM encouraged Client to call back once he is with mother to discuss steps needed to reinstate SSI benefits  Moments later, Client and his mom called CM  DEVIN explained Client needs to take copy of police report to [facility] office and request SSI benefits to be reinstated  Client agreed to meet CM tomorrow and  copy of police report and have his step-dad take him to [facility] office  CM in agreement

## 2022-12-09 ENCOUNTER — PATIENT OUTREACH (OUTPATIENT)
Dept: SURGERY | Facility: CLINIC | Age: 42
End: 2022-12-09

## 2022-12-12 ENCOUNTER — PATIENT OUTREACH (OUTPATIENT)
Dept: SURGERY | Facility: CLINIC | Age: 42
End: 2022-12-12

## 2022-12-12 NOTE — PROGRESS NOTES
Client called CM to report step-father not available to take him to [facility] office, therefore will not be able to stop by and  copy of police report  Client continues to report interest working part-time, feels he could be doing more to be productive and help his mom  CM encouraged Client to connect with OVR service in [facility] for pre/vocatinal services and assistance with job search within the scope of his limitations (mild cognitive impairment)  Barriers to goal discussed: anxiety, depression, MCI  Client encouraged to seek and maintain mental health therapy  Client agreed to reach out to [facility] and report outcome

## 2022-12-13 ENCOUNTER — PATIENT OUTREACH (OUTPATIENT)
Dept: SURGERY | Facility: CLINIC | Age: 42
End: 2022-12-13

## 2022-12-13 ENCOUNTER — OFFICE VISIT (OUTPATIENT)
Dept: DENTISTRY | Facility: CLINIC | Age: 42
End: 2022-12-13

## 2022-12-13 VITALS — SYSTOLIC BLOOD PRESSURE: 125 MMHG | HEART RATE: 63 BPM | TEMPERATURE: 95.7 F | DIASTOLIC BLOOD PRESSURE: 73 MMHG

## 2022-12-13 DIAGNOSIS — K04.7 DENTAL INFECTION: Primary | ICD-10-CM

## 2022-12-13 RX ORDER — AMOXICILLIN 500 MG/1
500 CAPSULE ORAL EVERY 8 HOURS SCHEDULED
Qty: 21 CAPSULE | Refills: 0 | Status: SHIPPED | OUTPATIENT
Start: 2022-12-13 | End: 2022-12-20

## 2022-12-13 NOTE — PROGRESS NOTES
Emergency    S: CC: "I have pain in the LL  I feel a throbbing pain that is 9/10 that stated 3 days ago  My tooth on LL is cold sensitive to everything, cold, sweet, cannot chew on that side  When I bite down, it hurts"     ASA: II   Pain: 9/10     Location: #30  Quality/Description of pain: throbbing constant, wakes him up at night  Severity/Intensity/Quantity :9/10  Timing: 3 days ago, sensitive to anything but especially upon mastication  Medications: did not take any medications for the pain     Oral Cancer Screening Completed  WNL  O: PA taken of #19, 30 and PAN was taken and reviewed  Large radiolucent carious lesion of the crowns of teeth #19, 30 that extends to the pulp  Roots #19 and 30 do not extend into the CECILE  #30 presents with extensive Mesial decay  with furcation involvement II  Carious root tip tooth #13 with widened PDL  Sinuses are patent  TMJ are seated bilaterally and No TMJ pathology noted  No other bony pathology noted  [address], Patient has a complex medical history  EO : No swelling, TMJ and mandibular range of motion WNL  Tender upon palpation extraorally around tooth #30 along the body of the mandible  Normocephalic, atraumatic  No cervical LAD  No facial or neck swelling  IO: Poor OH  Large buccal,occlusal carious lesion of fully erupted teeth #19 and 30  [place] Overlying tissue is tender to palpation  No erythema, no swelling or purulence noted  Fractured, retained root tip with caries of tooth #13  Surrounding gingiva has mild edema and is erythematous and not tender to palpation  Posterior oropharynx is clear  Uvula midline  FOM soft, non-elevated  Salivary glands, floor of the mouth and tongue, mucosa, palate, pharynx- no significant findings  Occlusion is functionally NOT acceptable       A: Pt has carious, non-restorable teeth #13 and 19 and #30 with extensive Mesial decay extending to the pulp, furcation involvement I and mobility class II         P: Informed Pt that #13 and 19 are not restorable due to extensive decay  #30 is savable with RCT and crown  Percussion (+++)  Palpation (++)  No cold test was performed as the pt was very sensitive in that area  Pt was informed he has an option to come back for caries excavation of #30 and re-assess its restorability  Pt was made aware that because #30 has extensive decay, furcation involvement and mobility class 2, it has a questionable prognosis  IN addition,due to the extent of decay, tooth #30 will need RCT, core build up, crown lenghtening, crown  Pt understood  Pt CHOSE to proceed with EXTRACTIONs of #13, 19 and 30  Pt was referred to OS  Referral to OS , along with X rays and list of OS providers were handed to the patient  Prescription for AMOXI was sent to pharmacy  Pt denied allergy to Penicillin        NV: comp exam

## 2022-12-13 NOTE — PROGRESS NOTES
Client called CM to report received two letters in the mail, one from [address] [place] [address] and another from [facility] office  Client reported toothache since for two days, could not sleep yesterday, states pain level at 10  CM encouraged Client to visit [facility] clinic located at [address] [address]  CM also encouraged Client to stop by tomorrow to  copy of police report to take to [facility] office and request SSI benefits to be reinstated  Client in agreement

## 2022-12-16 ENCOUNTER — PATIENT OUTREACH (OUTPATIENT)
Dept: SURGERY | Facility: CLINIC | Age: 42
End: 2022-12-16

## 2022-12-19 ENCOUNTER — PATIENT OUTREACH (OUTPATIENT)
Dept: SURGERY | Facility: CLINIC | Age: 42
End: 2022-12-19

## 2022-12-19 NOTE — PROGRESS NOTES
Client called to report spoke to Step-dad about going to [facility] office and submit copy of police report  Step-dad agreed to take Client Monday morning (12/19/2022)  Medication prescription discussed, Client reports confusion about new anti-biotic medication prescribed by dentist  CM checked Epic and confirmed newly prescribed meds, spoke to Client's mother Kenneth Carmine) about member's med confusion  Mother able to assist Client arraigning medication and intake reminders  Client pleased with assistance, agreed to keep CM updated

## 2022-12-20 ENCOUNTER — PATIENT OUTREACH (OUTPATIENT)
Dept: SURGERY | Facility: CLINIC | Age: 42
End: 2022-12-20

## 2022-12-20 NOTE — PROGRESS NOTES
Client called CM to report visited [address] office with step-dad, provided copy of police reports on identify theft  Client reports during [address] visit was asked about work history dating 2021 to 2022 and to which Client denied and reported was incarcerated  Client was asked to submit proof of incarceration  Client asked CM for assistance contacting the [address] at [place], [place]  Moments later, CM and Client met  CM contacted [address] at [place], told about option to request records via email by visiting website ([email or web address] ee  [email or web address]) completing "Right to Know" form and e-mailing it to Carlos Max, [employer] , Department [place] [address] Office at [email or web address]   Once received and processed, an e-mail will be sent to Client informing of records obtained and any fees associated with request     Client signed NICHOLE  CM assisted Client complete "Right to Know" form and emailing it  (see attachment)  Client also reports received notice from [facility] AUTHORITY program requesting proof of SNAP/Medicaid  CM assisted Client login into [facility] and submitting copy of SNAP/Medicaid cards

## 2022-12-21 ENCOUNTER — PATIENT OUTREACH (OUTPATIENT)
Dept: SURGERY | Facility: CLINIC | Age: 42
End: 2022-12-21

## 2022-12-21 DIAGNOSIS — Z01.30 BP CHECK: Primary | ICD-10-CM

## 2022-12-21 NOTE — PROGRESS NOTES
Client called CM expressing frustration due to continued pain related to tooth infection, cannot recall instructions provided during dental visit  CM revised latest dental note with Client  CM informed Client he was found to have none restorable extensive teeth decay and underwent x-ray, referred for Oral Maxillofacial Surgery and limited oral evaluation  Client acknowledged receiving referral list of dental surgery providers to contact for appointment but needing assistance coordinating appointments  CM agreed to assist     CM called [address] ([phone]) but unable to reach anyone, will try again later

## 2022-12-22 ENCOUNTER — PATIENT OUTREACH (OUTPATIENT)
Dept: SURGERY | Facility: CLINIC | Age: 42
End: 2022-12-22

## 2022-12-22 DIAGNOSIS — K06.1 GUM HYPERPLASIA: ICD-10-CM

## 2022-12-22 DIAGNOSIS — K04.7 CHRONIC DENTAL INFECTION: Primary | ICD-10-CM

## 2022-12-22 DIAGNOSIS — K08.9 POOR DENTITION: ICD-10-CM

## 2022-12-22 RX ORDER — CHLORHEXIDINE GLUCONATE 0.12 MG/ML
15 RINSE ORAL 2 TIMES DAILY
Qty: 120 ML | Refills: 2 | Status: SHIPPED | OUTPATIENT
Start: 2022-12-22

## 2022-12-22 RX ORDER — AMOXICILLIN AND CLAVULANATE POTASSIUM 875; 125 MG/1; MG/1
1 TABLET, FILM COATED ORAL EVERY 12 HOURS SCHEDULED
Qty: 20 TABLET | Refills: 0 | Status: SHIPPED | OUTPATIENT
Start: 2022-12-22 | End: 2023-01-01

## 2022-12-23 NOTE — PROGRESS NOTES
CM called [facility] [employer] ([phone]), discuss Client's need for dental surgery appointment  CM encouraged to contact [address] [place] [address] at [phone] for an appt with oral surgeon  CM called [facility], spoke to reception who stated no appts available at this time, encouraged CM to call after the new year  CM called Client, provided update  Client reports received e-mail from [facility] re: Right to Know request stating that request was denied due to failure to identify or describe with sufficient specificity  CM assisted Client submit another "Right to Know" explaining that Client is seeking to reinstate SSI benefits and SSA office is requesting incarceration records and asked for letter stating date of arrest, date of admission to state [facility] and date of release  No other needs reported/discussed

## 2022-12-27 ENCOUNTER — TELEPHONE (OUTPATIENT)
Dept: PSYCHIATRY | Facility: CLINIC | Age: 42
End: 2022-12-27

## 2022-12-27 ENCOUNTER — PATIENT OUTREACH (OUTPATIENT)
Dept: SURGERY | Facility: CLINIC | Age: 42
End: 2022-12-27

## 2022-12-27 NOTE — TELEPHONE ENCOUNTER
Contacted patient using interpretive services Isaiah Aparicio, [id number]) in regards to routine referral in attempts to add patient to proper wait list  Spoke w  Patient whom asked for writer name which was provided in then patient stated they want to go through their  and not our services when interpretor attempted to receive clarification on what patient meant line was disconnected   Referral closed per pt request

## 2022-12-27 NOTE — PROGRESS NOTES
DEVIN case conference with Brannon Galindo, [place] [employer]  re: Right to Know request     CM provided officer with Client's demographic information needed to process request     Officer agreed to work on request and contact CM with update  CM called Client and provided update  Client reports continued dental pain, asked CM for assistance contacting PCP for pain med prescription  CM agreed to transfer Client to Clinic, encouraged him to explain issue and request pain meds  Client in agreement and transferred to clinic

## 2022-12-30 NOTE — PROGRESS NOTES
Client called CM to report receive call from Psychiatry department but refused to engage in conversation because he was not aware that he was going to receive such call  CM reminded Client of Psych referral placed by PCP due to reported depression, anxiety and frustration emanating from challenges associated with mild cognitive impairment  Dental needs also discussed  Client reports toothache has lessen, taking pain and HIV meds as prescribed  CM encouraged Client to call back  Psych provider and discuss needs  Client agreed to do so and report outcome

## 2023-01-03 ENCOUNTER — PATIENT OUTREACH (OUTPATIENT)
Dept: SURGERY | Facility: CLINIC | Age: 43
End: 2023-01-03

## 2023-01-04 ENCOUNTER — PATIENT OUTREACH (OUTPATIENT)
Dept: SURGERY | Facility: CLINIC | Age: 43
End: 2023-01-04

## 2023-01-04 NOTE — PROGRESS NOTES
Client called CM to follow up with "Request to Know" letter  Client reports no e-mail response has been received    CM agreed to assist   CM called Troy Ríos, [employer]  () and left message asking for update on "Right to Know" request

## 2023-01-05 ENCOUNTER — PATIENT OUTREACH (OUTPATIENT)
Dept: SURGERY | Facility: CLINIC | Age: 43
End: 2023-01-05

## 2023-01-05 NOTE — PROGRESS NOTES
CM case conference with Rudy Reeves, [employer]  () and "Right to Know" request     Officer agreed to e-mail letter to Client  Moments later Client called CM to report received letter via e-mail  Client forwarded copy of letter to this CM (see attachment)  CM and Client agreed to meet tomorrow to review letter and other docs needed for SSA to reinstate SSI benefits

## 2023-01-06 NOTE — PROGRESS NOTES
CM met with Client during office visit to discuss SNAP and SSI case status  Client reports has not received food stamps this month, asked CM for assistance inquiring  CM assisted Client login into his [employer] account and SNAP case still not open, shows only Medicaid active  CM called [facility] ([phone]) to inquire but there was no response  CM also contacted Darek Herbert at [facility] to inquire re: Insurance Fraud Claim  CM transferred to Supervisor (Jeri Post: [phone]) for update, message left asking for call back  CM informed Client of need to obtain status on Fraud claim to submit to [facility] office along with jail records  Client in agreement  Client agreed to check his mail for any information on SNAP case and report outcome

## 2023-01-09 ENCOUNTER — PATIENT OUTREACH (OUTPATIENT)
Dept: SURGERY | Facility: CLINIC | Age: 43
End: 2023-01-09

## 2023-01-10 ENCOUNTER — DOCUMENTATION (OUTPATIENT)
Dept: SURGERY | Facility: CLINIC | Age: 43
End: 2023-01-10

## 2023-01-10 ENCOUNTER — PATIENT OUTREACH (OUTPATIENT)
Dept: SURGERY | Facility: CLINIC | Age: 43
End: 2023-01-10

## 2023-01-10 ENCOUNTER — OFFICE VISIT (OUTPATIENT)
Dept: SURGERY | Facility: CLINIC | Age: 43
End: 2023-01-10

## 2023-01-10 VITALS
OXYGEN SATURATION: 99 % | WEIGHT: 184 LBS | RESPIRATION RATE: 18 BRPM | TEMPERATURE: 95.8 F | BODY MASS INDEX: 22.88 KG/M2 | SYSTOLIC BLOOD PRESSURE: 108 MMHG | DIASTOLIC BLOOD PRESSURE: 64 MMHG | HEIGHT: 75 IN | HEART RATE: 63 BPM

## 2023-01-10 DIAGNOSIS — Z13.220 ENCOUNTER FOR LIPID SCREENING FOR CARDIOVASCULAR DISEASE: ICD-10-CM

## 2023-01-10 DIAGNOSIS — R73.01 IMPAIRED FASTING GLUCOSE: ICD-10-CM

## 2023-01-10 DIAGNOSIS — Z91.89 ENCOUNTER FOR HEPATITIS C VIRUS SCREENING TEST FOR HIGH RISK PATIENT: ICD-10-CM

## 2023-01-10 DIAGNOSIS — Z13.6 ENCOUNTER FOR SPECIAL SCREENING EXAMINATION FOR CARDIOVASCULAR DISORDER: ICD-10-CM

## 2023-01-10 DIAGNOSIS — Z13.6 ENCOUNTER FOR LIPID SCREENING FOR CARDIOVASCULAR DISEASE: ICD-10-CM

## 2023-01-10 DIAGNOSIS — Z29.8 NEED FOR PNEUMOCYSTIS PROPHYLAXIS: ICD-10-CM

## 2023-01-10 DIAGNOSIS — R22.0 JAW SWELLING: ICD-10-CM

## 2023-01-10 DIAGNOSIS — R73.03 PREDIABETES: ICD-10-CM

## 2023-01-10 DIAGNOSIS — A53.9 SYPHILIS: ICD-10-CM

## 2023-01-10 DIAGNOSIS — Z11.1 SCREENING EXAMINATION FOR PULMONARY TUBERCULOSIS: ICD-10-CM

## 2023-01-10 DIAGNOSIS — Z20.2 CONTACT WITH AND (SUSPECTED) EXPOSURE TO INFECTIONS WITH A PREDOMINANTLY SEXUAL MODE OF TRANSMISSION: ICD-10-CM

## 2023-01-10 DIAGNOSIS — Z13.1 SCREENING FOR DIABETES MELLITUS (DM): ICD-10-CM

## 2023-01-10 DIAGNOSIS — T65.222D TOXIC EFFECT OF TOBACCO CIGARETTE, INTENTIONAL SELF-HARM, SUBSEQUENT ENCOUNTER: ICD-10-CM

## 2023-01-10 DIAGNOSIS — B20 CURRENTLY ASYMPTOMATIC HIV INFECTION, WITH HISTORY OF HIV-RELATED ILLNESS (HCC): Primary | ICD-10-CM

## 2023-01-10 DIAGNOSIS — K08.9 POOR DENTITION: ICD-10-CM

## 2023-01-10 DIAGNOSIS — R22.0 SWELLING OF MANDIBLE: ICD-10-CM

## 2023-01-10 DIAGNOSIS — K02.9 PAIN DUE TO DENTAL CARIES: ICD-10-CM

## 2023-01-10 DIAGNOSIS — Z11.59 ENCOUNTER FOR HEPATITIS C VIRUS SCREENING TEST FOR HIGH RISK PATIENT: ICD-10-CM

## 2023-01-10 DIAGNOSIS — K03.2 EROSION OF TEETH, ENAMEL: ICD-10-CM

## 2023-01-10 DIAGNOSIS — Z77.21 PERSONAL HISTORY OF EXPOSURE TO POTENTIALLY HAZARDOUS BODY FLUIDS: ICD-10-CM

## 2023-01-10 DIAGNOSIS — Z11.3 ENCOUNTER FOR SCREENING FOR INFECTIONS WITH PREDOMINANTLY SEXUAL MODE OF TRANSMISSION: ICD-10-CM

## 2023-01-10 NOTE — ASSESSMENT & PLAN NOTE
New onset of mandible swelling R > L  No acute signs of infection at this time: no puss or draiange, erythema, pain on palpation  Clicking with open/close of mouth  ABX X 2 course of Augmentin      Concern for inflammation, TMJ, abscess, cyst, osteonecrosis, or infection  · Check mandible xray  · Referral to dental  · VIJAY BELTRAN is working with pt to get in to dental asap  · Continue chlorhexidine  · Recommend smoking cessation

## 2023-01-10 NOTE — PROGRESS NOTES
Monika Keith is a 40 y o  male  Seen by RD in conjunction with PCP appt for f/u on unintended weight loss and food insecurity  Monitoring/evaluation (cont  From 11/29/22):  1  Food/nutrition intake   -Pt reported having improved appetite  -Mentioned eating chicken, rice; says that he has increased frequency of meals and eating small portions      2  Wt  Wt Readings from Last 3 Encounters:   01/10/23 83 5 kg (184 lb)   11/29/22 83 5 kg (184 lb)   11/02/22 84 5 kg (186 lb 3 2 oz)     Weight has stabilized      3  Knowledge/beliefs/attitudes   -Pt reported trying to eat throughout the day  -Recommend continued efforts and eating patterns    4  Factors affecting access to food    -Pt reported having SNAP reinstated, received money last month but has not this month  He is working with CM on this     -Pt has been going to [employer]) as needed for [address] Present   • Worried About [address] [email or web address] in the Last Year: Sometimes true   • Ran Out of Food in the Last Year: Sometimes true       Nutrition Diagnosis (cont/ from 11/2/22)    Problem: Unitended weight loss    Related to: Decreased appetite     As Evidenced By: Weight Loss and Patient Interview   -->Resolved at this time, weight has stabilized        Goals (from 11/2/22)   Goal #1 Avoid weight loss    Weight has been stable for >1 month  --> met at this time  Visit Summary    Monika Keith is a 40 y o  male seen by RD in conjunction with PCP appt;   Monika Keith reported having improved appetite and feeling somewhat better  RD encouraged small meals throughout the day, encouraged adequate kcal/protien intake  Weight has stabilized, pt has been accessing food resources available to him, he is working on PO intake  Pt spent time reminiscing about his enjoyment of sports and boxing in [facility] when he was young  RD offered listening ear  Offer nutritional education and support as warranted  Mariah Tay, MS, RD, LDN

## 2023-01-10 NOTE — ASSESSMENT & PLAN NOTE
· Stressed the importance of 100% smoking cessation    Nicotine Dependency - Primary    Counseled for greater than 15 minutes on the importance of smoking cessation  Education was given regarding the cardiovascular effects of how nicotine affects the heart, lungs, kidneys, and peripheral vascular system    Referred to [address] for enrollment in smoking cessation program

## 2023-01-10 NOTE — ASSESSMENT & PLAN NOTE
CD4 remains below a 100      · Continue Bactrim QD  · Pt will require bactrim for at least a year if CD4 remains > 100 or for 3 months if CD4 > 200  · Monitor CD4

## 2023-01-10 NOTE — PROGRESS NOTES
CM and Client met during clinic visit  Client reports dental issues: pain,swelling, tooth decay and infection  Client was evaluated by PCP  Client has dental appt with [address] [place] [address] for 4/21/2023, would like to see provider sooner to address needs  Client also reports vision problems, unable to recall when was last eye evaluation  Client asked for assistance with eye appointment  Client forgot to bring [facility] letter re: SNAP case  Client agreed to bring letter tomorrow  SSI case discussed  Client will asked step-dad for escort to [facility] office, will inform this CM in order to  copy of documents: police report, insurance claim fraud case closure and state [facility] letter  CM called [address] [place] [address], spoke to  re:Client's dental issues  No immediate appt available, Client added to cancellation list and will be notified when an appt becomes available  Dental Clinic tried accommodating client in the past with an emergency visit but client left without notice  CM called [address] ([phone]) and eye appt scheduled for Tuesday, January 17 at 1 PM     CM called Client and provided appt information  CM also Case Conference with PCP and Nutritionist re: case status

## 2023-01-10 NOTE — ASSESSMENT & PLAN NOTE
Serofast at 1:4 dils    · No further treatment f  · Monitor RPR  · Educated on the importance of using condoms at all times

## 2023-01-10 NOTE — PROGRESS NOTES
Assessment/Plan:    Currently asymptomatic HIV infection, with history of HIV-related illness ([address] )  Doing well on Descovy and Tivicay with an undetectable VL  Pt reports 100% medication compliance on HAART  Stressed the importance of 100% medication adherence  HIV Counseling:    Viral Load: < 20    CD4 Count: 96      Denies side effects  Stressed the importance of adherence  Continue follow up in the ID clinic with Dr Adriana Kirkland or Dr Mallory Gutierrez  Reviewed the most recent labs, including the CD4 and viral load  Discussed the risks and benefits of treatment options, instructions for management, importance of treatment adherence, and reduction of risk factors  Educated on possible medication side effects  Counseled on routes of HIV transmission, including the risk of  infection  Emphasized that viral suppression is the best method to prevent HIV transmission  At this time the pt denies the need for HIV testing of anyone in their life  Total encounter time was greater than 35 minutes  Greater than 20 minutes were spent on counseling and patient education  Pt voices understanding and agreement with treatment plan  Prediabetes  Stable  · Check annual A1C  · Follow with [facility]    Syphilis  Serofast at 1:4 dils  · No further treatment f  · Monitor RPR  · Educated on the importance of using condoms at all times     Need for pneumocystis prophylaxis  CD4 remains below a 100  · Continue Bactrim QD  · Pt will require bactrim for at least a year if CD4 remains > 100 or for 3 months if CD4 > 200  · Monitor CD4      Swelling of mandible  New onset of mandible swelling R > L  No acute signs of infection at this time: no puss or draiange, erythema, pain on palpation  Clicking with open/close of mouth  ABX X 2 course of Augmentin      Concern for inflammation, TMJ, abscess, cyst, osteonecrosis, or infection  · Check mandible xray  · Referral to dental  · VIJAY BELTRAN is working with pt to get in to dental asap  · Continue chlorhexidine  · Recommend smoking cessation      Toxic effect of tobacco cigarette  · Stressed the importance of 100% smoking cessation    Nicotine Dependency - Primary    Counseled for greater than 15 minutes on the importance of smoking cessation  Education was given regarding the cardiovascular effects of how nicotine affects the heart, lungs, kidneys, and peripheral vascular system  Referred to [facility]low [address] [place] [address] for enrollment in smoking cessation program          Diagnoses and all orders for this visit:    Currently asymptomatic HIV infection, with history of HIV-related illness ([address] )  -     HIV-1 RNA, quantitative, PCR; Future  -     T-helper cells CD4/CD8 %; Future  -     Comprehensive metabolic panel; Future  -     CBC and differential; Future  -     UA (URINE) with reflex to Scope    Prediabetes    Syphilis    Need for pneumocystis prophylaxis    Screening examination for pulmonary tuberculosis  -     Quantiferon TB Gold Plus; Future    Encounter for hepatitis C virus screening test for high risk patient  -     Hepatitis C antibody; Future    Contact with and (suspected) exposure to infections with a predominantly sexual mode of transmission  -     RPR; Future  -     Chlamydia/GC amplified DNA by PCR; Future    Personal history of exposure to potentially hazardous body fluids  -     RPR; Future  -     Chlamydia/GC amplified DNA by PCR; Future    Encounter for screening for infections with predominantly sexual mode of transmission  -     RPR; Future  -     Chlamydia/GC amplified DNA by PCR; Future    Encounter for special screening examination for cardiovascular disorder    Encounter for lipid screening for cardiovascular disease  -     Lipid panel; Future    Impaired fasting glucose  -     HEMOGLOBIN A1C W/ EAG ESTIMATION; Future    Screening for diabetes mellitus (DM)  -     HEMOGLOBIN A1C W/ EAG ESTIMATION;  Future    Jaw swelling    Swelling of mandible    Pain due to dental caries    Poor dentition    Erosion of teeth, enamel    Toxic effect of tobacco cigarette, intentional self-harm, subsequent encounter        Patient Active Problem List   Diagnosis   • Currently asymptomatic HIV infection, with history of HIV-related illness ([address] )   • Toxic effect of tobacco cigarette   • Wheezing without diagnosis of asthma   • Depression   • Anxiety   • Mental impairment   • Syphilis   • Need for pneumocystis prophylaxis   • Thrush, oral   • Weight loss   • Prediabetes   • Sublux of proximal interphaln joint of l mid finger, sequela   • Vitamin D deficiency   • Cognitive impairment   • Fear of death   • Marijuana abuse   • Poor dentition   • PTSD (post-traumatic stress disorder)   • Dental examination   • Swelling of mandible     Lab Results   Component Value Date    K 4 6 10/24/2022     10/24/2022    CO2 27 10/24/2022    BUN 12 10/24/2022    CREATININE 1 29 10/24/2022    GLUF 78 10/24/2022    CALCIUM 9 8 10/24/2022    AST 24 10/24/2022    ALT 20 10/24/2022    ALKPHOS 84 10/24/2022    EGFR 68 10/24/2022     Lab Results   Component Value Date    WBC 3 68 (L) 10/24/2022    WBC 3 6 10/24/2022    HGB 14 7 10/24/2022    HGB 15 0 10/24/2022    HCT 42 9 10/24/2022    HCT 43 8 10/24/2022    MCV 98 10/24/2022    MCV 98 (H) 10/24/2022     10/24/2022     10/24/2022       Subjective:      Patient ID: Bessy Haddad is a 40 y o  male  HPI  Pt presents for 2 month follow up visit for management of HIV and chronic health care conditions  Pt reports he wants to have his tooth out when at dentist  Pt reports tooth pain  Pt's weight remains stable  The following portions of the patient's history were reviewed and updated as appropriate: allergies, current medications, past family history, past medical history, past social history and problem list     Review of Systems   Constitutional: Negative  HENT: Positive for facial swelling  Eyes: Positive for visual disturbance  Respiratory: Negative  Cardiovascular: Negative  Gastrointestinal: Negative  Neurological: Negative  Psychiatric/Behavioral: Negative  Objective:      /64 (BP Location: Right arm, Patient Position: Sitting, Cuff Size: Standard)   Pulse 63   Temp (!) 95 8 °F (35 4 °C) (Tympanic)   Resp 18   Ht 6' 3" (1 905 m)   Wt 83 5 kg (184 lb)   SpO2 99%   BMI 23 00 kg/m²          Physical Exam  Vitals and nursing note reviewed  Constitutional:       General: He is not in acute distress  Appearance: He is obese  He is not ill-appearing  HENT:      Head:      Jaw: Tenderness and swelling present  Mouth/Throat:      Dentition: Abnormal dentition  Gingival swelling and dental caries present  Cardiovascular:      Rate and Rhythm: Normal rate and regular rhythm  Pulses: Normal pulses  Heart sounds: Normal heart sounds  Pulmonary:      Effort: Pulmonary effort is normal       Breath sounds: Normal breath sounds  Musculoskeletal:         General: Normal range of motion  Skin:     General: Skin is warm and dry  Capillary Refill: Capillary refill takes less than 2 seconds  Neurological:      Mental Status: He is oriented to person, place, and time  Psychiatric:         Mood and Affect: Mood normal          Behavior: Behavior normal          Thought Content:  Thought content normal          Judgment: Judgment normal

## 2023-01-10 NOTE — ASSESSMENT & PLAN NOTE
Doing well on Descovy and Tivicay with an undetectable VL  Pt reports 100% medication compliance on HAART  Stressed the importance of 100% medication adherence  HIV Counseling:    Viral Load: < 20    CD4 Count: 96      Denies side effects  Stressed the importance of adherence  Continue follow up in the ID clinic with Dr Shanell Garcia or Dr Jennifer Lyn  Reviewed the most recent labs, including the CD4 and viral load  Discussed the risks and benefits of treatment options, instructions for management, importance of treatment adherence, and reduction of risk factors  Educated on possible medication side effects  Counseled on routes of HIV transmission, including the risk of  infection  Emphasized that viral suppression is the best method to prevent HIV transmission  At this time the pt denies the need for HIV testing of anyone in their life  Total encounter time was greater than 35 minutes  Greater than 20 minutes were spent on counseling and patient education  Pt voices understanding and agreement with treatment plan

## 2023-01-11 ENCOUNTER — PATIENT OUTREACH (OUTPATIENT)
Dept: SURGERY | Facility: CLINIC | Age: 43
End: 2023-01-11

## 2023-01-11 ENCOUNTER — APPOINTMENT (OUTPATIENT)
Dept: LAB | Facility: CLINIC | Age: 43
End: 2023-01-11

## 2023-01-11 DIAGNOSIS — Z11.1 SCREENING EXAMINATION FOR PULMONARY TUBERCULOSIS: ICD-10-CM

## 2023-01-11 DIAGNOSIS — Z91.89 ENCOUNTER FOR HEPATITIS C VIRUS SCREENING TEST FOR HIGH RISK PATIENT: ICD-10-CM

## 2023-01-11 DIAGNOSIS — R73.01 IMPAIRED FASTING GLUCOSE: ICD-10-CM

## 2023-01-11 DIAGNOSIS — K06.1 GUM HYPERPLASIA: ICD-10-CM

## 2023-01-11 DIAGNOSIS — B20 CURRENTLY ASYMPTOMATIC HIV INFECTION, WITH HISTORY OF HIV-RELATED ILLNESS (HCC): ICD-10-CM

## 2023-01-11 DIAGNOSIS — Z11.59 ENCOUNTER FOR HEPATITIS C VIRUS SCREENING TEST FOR HIGH RISK PATIENT: ICD-10-CM

## 2023-01-11 DIAGNOSIS — Z11.3 ENCOUNTER FOR SCREENING FOR INFECTIONS WITH PREDOMINANTLY SEXUAL MODE OF TRANSMISSION: ICD-10-CM

## 2023-01-11 DIAGNOSIS — Z13.220 ENCOUNTER FOR LIPID SCREENING FOR CARDIOVASCULAR DISEASE: ICD-10-CM

## 2023-01-11 DIAGNOSIS — K08.9 POOR DENTITION: ICD-10-CM

## 2023-01-11 DIAGNOSIS — Z13.6 ENCOUNTER FOR LIPID SCREENING FOR CARDIOVASCULAR DISEASE: ICD-10-CM

## 2023-01-11 DIAGNOSIS — Z20.2 CONTACT WITH AND (SUSPECTED) EXPOSURE TO INFECTIONS WITH A PREDOMINANTLY SEXUAL MODE OF TRANSMISSION: ICD-10-CM

## 2023-01-11 DIAGNOSIS — Z13.1 SCREENING FOR DIABETES MELLITUS (DM): ICD-10-CM

## 2023-01-11 DIAGNOSIS — Z77.21 PERSONAL HISTORY OF EXPOSURE TO POTENTIALLY HAZARDOUS BODY FLUIDS: ICD-10-CM

## 2023-01-11 LAB
ALBUMIN SERPL BCP-MCNC: 4 G/DL (ref 3.5–5)
ALP SERPL-CCNC: 91 U/L (ref 46–116)
ALT SERPL W P-5'-P-CCNC: 24 U/L (ref 12–78)
ANION GAP SERPL CALCULATED.3IONS-SCNC: 4 MMOL/L (ref 4–13)
AST SERPL W P-5'-P-CCNC: 26 U/L (ref 5–45)
BASOPHILS # BLD AUTO: 0.05 THOUSANDS/ÂΜL (ref 0–0.1)
BASOPHILS NFR BLD AUTO: 1 % (ref 0–1)
BILIRUB SERPL-MCNC: 0.36 MG/DL (ref 0.2–1)
BILIRUB UR QL STRIP: NEGATIVE
BUN SERPL-MCNC: 11 MG/DL (ref 5–25)
C TRACH DNA SPEC QL NAA+PROBE: NEGATIVE
CALCIUM SERPL-MCNC: 9.2 MG/DL (ref 8.3–10.1)
CHLORIDE SERPL-SCNC: 106 MMOL/L (ref 96–108)
CHOLEST SERPL-MCNC: 164 MG/DL
CLARITY UR: CLEAR
CO2 SERPL-SCNC: 28 MMOL/L (ref 21–32)
COLOR UR: NORMAL
CREAT SERPL-MCNC: 1.07 MG/DL (ref 0.6–1.3)
EOSINOPHIL # BLD AUTO: 0.04 THOUSAND/ÂΜL (ref 0–0.61)
EOSINOPHIL NFR BLD AUTO: 1 % (ref 0–6)
ERYTHROCYTE [DISTWIDTH] IN BLOOD BY AUTOMATED COUNT: 13 % (ref 11.6–15.1)
EST. AVERAGE GLUCOSE BLD GHB EST-MCNC: 114 MG/DL
GFR SERPL CREATININE-BSD FRML MDRD: 85 ML/MIN/1.73SQ M
GLUCOSE P FAST SERPL-MCNC: 97 MG/DL (ref 65–99)
GLUCOSE UR STRIP-MCNC: NEGATIVE MG/DL
HBA1C MFR BLD: 5.6 %
HCT VFR BLD AUTO: 42.4 % (ref 36.5–49.3)
HCV AB SER QL: NORMAL
HDLC SERPL-MCNC: 62 MG/DL
HGB BLD-MCNC: 14.4 G/DL (ref 12–17)
HGB UR QL STRIP.AUTO: NEGATIVE
IMM GRANULOCYTES # BLD AUTO: 0.01 THOUSAND/UL (ref 0–0.2)
IMM GRANULOCYTES NFR BLD AUTO: 0 % (ref 0–2)
KETONES UR STRIP-MCNC: NEGATIVE MG/DL
LDLC SERPL CALC-MCNC: 90 MG/DL (ref 0–100)
LEUKOCYTE ESTERASE UR QL STRIP: NEGATIVE
LYMPHOCYTES # BLD AUTO: 0.97 THOUSANDS/ÂΜL (ref 0.6–4.47)
LYMPHOCYTES NFR BLD AUTO: 21 % (ref 14–44)
MCH RBC QN AUTO: 33.6 PG (ref 26.8–34.3)
MCHC RBC AUTO-ENTMCNC: 34 G/DL (ref 31.4–37.4)
MCV RBC AUTO: 99 FL (ref 82–98)
MONOCYTES # BLD AUTO: 0.32 THOUSAND/ÂΜL (ref 0.17–1.22)
MONOCYTES NFR BLD AUTO: 7 % (ref 4–12)
N GONORRHOEA DNA SPEC QL NAA+PROBE: NEGATIVE
NEUTROPHILS # BLD AUTO: 3.24 THOUSANDS/ÂΜL (ref 1.85–7.62)
NEUTS SEG NFR BLD AUTO: 70 % (ref 43–75)
NITRITE UR QL STRIP: NEGATIVE
NONHDLC SERPL-MCNC: 102 MG/DL
NRBC BLD AUTO-RTO: 0 /100 WBCS
PH UR STRIP.AUTO: 6.5 [PH]
PLATELET # BLD AUTO: 216 THOUSANDS/UL (ref 149–390)
PMV BLD AUTO: 9.7 FL (ref 8.9–12.7)
POTASSIUM SERPL-SCNC: 4.4 MMOL/L (ref 3.5–5.3)
PROT SERPL-MCNC: 8.3 G/DL (ref 6.4–8.4)
PROT UR STRIP-MCNC: NEGATIVE MG/DL
RBC # BLD AUTO: 4.29 MILLION/UL (ref 3.88–5.62)
SODIUM SERPL-SCNC: 138 MMOL/L (ref 135–147)
SP GR UR STRIP.AUTO: 1.02 (ref 1–1.03)
TRIGL SERPL-MCNC: 62 MG/DL
UROBILINOGEN UR STRIP-ACNC: <2 MG/DL
WBC # BLD AUTO: 4.63 THOUSAND/UL (ref 4.31–10.16)

## 2023-01-11 RX ORDER — CHLORHEXIDINE GLUCONATE 0.12 MG/ML
15 RINSE ORAL 2 TIMES DAILY
Qty: 120 ML | Refills: 2 | Status: SHIPPED | OUTPATIENT
Start: 2023-01-11

## 2023-01-12 ENCOUNTER — PATIENT OUTREACH (OUTPATIENT)
Dept: SURGERY | Facility: CLINIC | Age: 43
End: 2023-01-12

## 2023-01-12 LAB
BASOPHILS # BLD AUTO: 0.1 X10E3/UL (ref 0–0.2)
BASOPHILS NFR BLD AUTO: 1 %
CD3+CD4+ CELLS # BLD: 120 /UL (ref 359–1519)
CD3+CD4+ CELLS NFR BLD: 12 % (ref 30.8–58.5)
CD3+CD4+ CELLS/CD3+CD8+ CLL BLD: 0.2 % (ref 0.92–3.72)
CD3+CD8+ CELLS # BLD: 587 /UL (ref 109–897)
CD3+CD8+ CELLS NFR BLD: 58.7 % (ref 12–35.5)
EOSINOPHIL # BLD AUTO: 0 X10E3/UL (ref 0–0.4)
EOSINOPHIL NFR BLD AUTO: 1 %
ERYTHROCYTE [DISTWIDTH] IN BLOOD BY AUTOMATED COUNT: 13 % (ref 11.6–15.4)
GAMMA INTERFERON BACKGROUND BLD IA-ACNC: 0.04 IU/ML
HCT VFR BLD AUTO: 42 % (ref 37.5–51)
HGB BLD-MCNC: 14.4 G/DL (ref 13–17.7)
IMM GRANULOCYTES # BLD: 0 X10E3/UL (ref 0–0.1)
IMM GRANULOCYTES NFR BLD: 0 %
LYMPHOCYTES # BLD AUTO: 1 X10E3/UL (ref 0.7–3.1)
LYMPHOCYTES NFR BLD AUTO: 22 %
M TB IFN-G BLD-IMP: NEGATIVE
M TB IFN-G CD4+ BCKGRND COR BLD-ACNC: 0 IU/ML
M TB IFN-G CD4+ BCKGRND COR BLD-ACNC: 0 IU/ML
MCH RBC QN AUTO: 33.4 PG (ref 26.6–33)
MCHC RBC AUTO-ENTMCNC: 34.3 G/DL (ref 31.5–35.7)
MCV RBC AUTO: 97 FL (ref 79–97)
MITOGEN IGNF BCKGRD COR BLD-ACNC: 2.93 IU/ML
MONOCYTES # BLD AUTO: 0.3 X10E3/UL (ref 0.1–0.9)
MONOCYTES NFR BLD AUTO: 7 %
NEUTROPHILS # BLD AUTO: 3.1 X10E3/UL (ref 1.4–7)
NEUTROPHILS NFR BLD AUTO: 69 %
PLATELET # BLD AUTO: 224 X10E3/UL (ref 150–450)
RBC # BLD AUTO: 4.31 X10E6/UL (ref 4.14–5.8)
RPR SER QL: ABNORMAL
WBC # BLD AUTO: 4.5 X10E3/UL (ref 3.4–10.8)

## 2023-01-13 ENCOUNTER — PATIENT OUTREACH (OUTPATIENT)
Dept: SURGERY | Facility: CLINIC | Age: 43
End: 2023-01-13

## 2023-01-13 NOTE — PROGRESS NOTES
CM met with Client  Client reports adherence to today's PCP appt, reports adherence to HIV medication  Also did labs today  Viral load undetectable and CD4 count at 85  Client brought letters received from [facility] for revision and asked for assistance completing public housing work application  CM inform Client is being asked to submit letter from [employer] [place] office stating that he reported identity theft needed to continue to receive SNAP benefits  CM encouraged Client to visit [employer] [place] office and request letter, at the same time submit copy of [employer] letter, RTK request, cancellation of unemployment claim from [facility] and letter from this CM providing explanation (see attachment)  Client also explained received call from eye clinic re: appt cancellation  CM called [facility] to inquire, CM told they do not take Cristóbal & Katheryn and therefore cancelled the appointment  CM Case Conference with PCP via e-mailed and inform of cancellation  Client agreed to visit [employer] [place] office this Friday and report outcome

## 2023-01-13 NOTE — PROGRESS NOTES
Client called CM to report found SNAP letter  Client also expressed interest in finding work, visited [facility] and got a work application, asked to meet [facility]  CM encouraged Client to contact [facility] worker for assistance with finding work due to his mild cognitive impairment diagnosis  Client in agreement

## 2023-01-14 LAB
HIV1 RNA # SERPL NAA+PROBE: <20 COPIES/ML
HIV1 RNA SERPL NAA+PROBE-LOG#: NORMAL LOG10COPY/ML

## 2023-01-18 ENCOUNTER — PATIENT OUTREACH (OUTPATIENT)
Dept: SURGERY | Facility: CLINIC | Age: 43
End: 2023-01-18

## 2023-01-18 ENCOUNTER — OFFICE VISIT (OUTPATIENT)
Dept: SURGERY | Facility: CLINIC | Age: 43
End: 2023-01-18

## 2023-01-18 VITALS
SYSTOLIC BLOOD PRESSURE: 136 MMHG | OXYGEN SATURATION: 99 % | DIASTOLIC BLOOD PRESSURE: 73 MMHG | HEART RATE: 80 BPM | TEMPERATURE: 96.6 F

## 2023-01-18 DIAGNOSIS — K08.9 POOR DENTITION: ICD-10-CM

## 2023-01-18 DIAGNOSIS — F12.10 MARIJUANA ABUSE: ICD-10-CM

## 2023-01-18 DIAGNOSIS — R73.03 PREDIABETES: ICD-10-CM

## 2023-01-18 DIAGNOSIS — T65.221A: ICD-10-CM

## 2023-01-18 DIAGNOSIS — B20 HIV DISEASE (HCC): Primary | ICD-10-CM

## 2023-01-18 DIAGNOSIS — E55.9 VITAMIN D DEFICIENCY: ICD-10-CM

## 2023-01-18 DIAGNOSIS — A53.9 SYPHILIS: ICD-10-CM
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
## 2023-01-18 NOTE — PROGRESS NOTES
Progress Note - Infectious Disease   Betty Lloyd 40 y o  male MRN: [id number]  Unit/Bed#:  Encounter: [id number]      Impression/Plan:    1  HIV- initial diagnosis in 1990 through heterosexual encounter  Patient had been incarcerated, and apparently had not been taking ART since 2019, prior to initial consultation; HIV viral load was 400 K and CD4 count of 20 on initial presentation  Patient continues to do reasonably well on Tivicay and Descovy with an undetectable viral load and a CD4 count is increased to 120 from 85 on previous labwork  Viral load remains undetectable  Patient endorses compliance with ART  Renal function and liver function test remain stable  Reports no acute illnesses nor manifests any physical examination findings suggestive of opportunistic infection nor medication side-effects   Continue ART, recheck labs in 2 months and follow up in 3 months   Stressed adherence  As patient has a CD4 count between 100 and 200 and undetectable viral for greater than one year, will discontinue Bactrim      2  Mental retardation-followed closely by case management and behaviorist     3  Vitamin-D deficiency-vitamin-D replacement     4  Nicotine dependence-continue to stressed importance of complete tobacco cessation     5  Pre diabetes-diet control for now  Recent A1c of 5 7 and FPG of 97      7  Syphilis-  RPR was positive at 1:4 with a positive FTA on initial presentation  He was treated for syphilis on 9/10/2021 with benzathine penicillin g x3 doses  8  Very poor dentition  Refer to dental for management of multiple caries       Patient was provided medication, adherence and prevention education    Subjective:  Routine follow-up for HIV  Patient claims 100% adherence with tivicay and descovy   Patient denies any notable side effects  Overall the feeling well  The patient denies any fever chills or sweats, denies any nausea vomiting or diarrhea, denies any cough or shortness of breath  Reports some stress over family-related issues, and having to source a new place of abode  ROS:  A complete review of systems is negative other than that noted above in the subjective    Followup portions patient history reviewed and updated as: Allergies, current medications, past medical history, past social history, past surgical history, and the problem list    Objective:  Vitals:  Vitals:    01/18/23 1635   BP: 136/73   BP Location: Left arm   Patient Position: Sitting   Pulse: 80   Temp: (!) 96 6 °F (35 9 °C)   TempSrc: Tympanic   SpO2: 99%       Physical Exam:   General Appearance:  Alert, interactive, appearing well,  nontoxic, no acute distress  Neck:   Supple without lymphadenopathy, no thyromegaly or masses   Throat: Oropharynx moist without lesions  Lungs:   Clear to auscultation bilaterally; no wheezes, rhonchi or rales; respirations unlabored   Heart:  RRR; no murmur, rub or gallop   Abdomen:   Soft, non-tender, non-distended, positive bowel sounds  Extremities: No clubbing, cyanosis or edema   Skin: No new rashes or lesions  No draining wounds noted         Labs, Imaging, & Other studies:   All pertinent labs and imaging studies were personally reviewed    Lab Results   Component Value Date    K 4 4 01/11/2023     01/11/2023    CO2 28 01/11/2023    BUN 11 01/11/2023    CREATININE 1 07 01/11/2023    GLUF 97 01/11/2023    CALCIUM 9 2 01/11/2023    AST 26 01/11/2023    ALT 24 01/11/2023    ALKPHOS 91 01/11/2023    EGFR 85 01/11/2023     Lab Results   Component Value Date    WBC 4 5 01/11/2023    WBC 4 63 01/11/2023    HGB 14 4 01/11/2023    HGB 14 4 01/11/2023    HCT 42 0 01/11/2023    HCT 42 4 01/11/2023    MCV 97 01/11/2023    MCV 99 (H) 01/11/2023     01/11/2023     01/11/2023     Lab Results   Component Value Date    HEPCAB Non-reactive 01/11/2023     Lab Results   Component Value Date    HAV Reactive (A) 06/15/2021    HEPCAB Non-reactive 01/11/2023     Lab Results Component Value Date    RPR Minimally Reactive (A) 01/11/2023     CD4 ABS   Date/Time Value Ref Range Status   01/11/2023 10:41  (L) 359 - 1519 /uL Final     HIV-1 RNA by PCR, Qn   Date/Time Value Ref Range Status   01/11/2023 10:41 AM <20 copies/mL Final     Comment:     HIV-1 RNA not detected  The reportable range for this assay is 20 to 10,000,000  copies HIV-1 RNA/mL             Current Outpatient Medications:   •  acetaminophen (TYLENOL) 650 mg CR tablet, Take 1 tablet (650 mg total) by mouth every 8 (eight) hours as needed for mild pain, Disp: 30 tablet, Rfl: 0  •  albuterol (Ventolin HFA) 90 mcg/act inhaler, Inhale 2 puffs every 6 (six) hours as needed for wheezing, Disp: 18 g, Rfl: 2  •  Blood Pressure Monitoring (Comfort Touch BP Cuff/Large) MISC, Use 1 each in the morning, Disp: 1 each, Rfl: 0  •  chlorhexidine (PERIDEX) 0 12 % solution, Apply 15 mL to the mouth or throat 2 (two) times a day, Disp: 120 mL, Rfl: 2  •  Cholecalciferol 125 MCG (5000 UT) capsule, Take 1 capsule by mouth (Patient not taking: Reported on 11/2/2022), Disp: , Rfl:   •  Descovy 200-25 MG tablet, TAKE 1 TABLET BY MOUTH IN THE MORNING, Disp: 30 tablet, Rfl: 2  •  Multiple Vitamin (Tab-A-María) TABS, TAKE 1 TABLET BY MOUTH IN THE MORNING, Disp: 90 tablet, Rfl: 1  •  sulfamethoxazole-trimethoprim (BACTRIM DS) 800-160 mg per tablet, TAKE 1 TABLET BY MOUTH IN THE MORNING, Disp: 30 tablet, Rfl: 5  •  Tivicay 50 MG TABS, TAKE 1 TABLET BY MOUTH IN THE MORNING, Disp: 30 tablet, Rfl: 2

## 2023-01-19 ENCOUNTER — PATIENT OUTREACH (OUTPATIENT)
Dept: SURGERY | Facility: CLINIC | Age: 43
End: 2023-01-19

## 2023-01-20 ENCOUNTER — PATIENT OUTREACH (OUTPATIENT)
Dept: SURGERY | Facility: CLINIC | Age: 43
End: 2023-01-20

## 2023-01-23 ENCOUNTER — PATIENT OUTREACH (OUTPATIENT)
Dept: SURGERY | Facility: CLINIC | Age: 43
End: 2023-01-23

## 2023-01-23 NOTE — PROGRESS NOTES
Clinic reception called CM to report tried calling Client to remind of today's HIV appt but there was no response  CM agreed to follow up with Client  CM called Client  CM reports feeling depressed, had to leave his mother's apartment  Client reports a neighbor called the  on him several times at his mother's apartment and this cause a rift with step-dad  Client staying temporarily in a friends apartment  CM encouraged Client to adhere to today's HIV appt and meet afterwards to discuss housing options and MH therapy  Moments later, CM met with Client during clinic visit  Client adherent to HIV appointment  Supportive housing options discussed  Client declines [address] referral at this time  CM agreed to assist with supportive housing referral  No other needs reported

## 2023-01-23 NOTE — PROGRESS NOTES
Client called CM to report tried calling [facility] several times to inquire about SNAP benefits with no response  CM asked to meet with this CM to report SNAP case via [facility] website  Moments later CM met with Client  CM checked Client's SNAP benefits status via [employer]  SNAP case still open but no benefits allocated to Client  CM encouraged Client to visit [address] office located in [place], provide them with copy of supprotive documents given to [facility] office and inquire about case status  Client agreed to do so this Friday (2/27/23) and report outcome  Supportive hosing discussed  CM called [place] [address] [facility] program () to inquire re: supportive housing services, message left asking for call back  No other needs reported  09:24

## 2023-01-25 ENCOUNTER — PATIENT OUTREACH (OUTPATIENT)
Dept: SURGERY | Facility: CLINIC | Age: 43
End: 2023-01-25

## 2023-01-27 ENCOUNTER — PATIENT OUTREACH (OUTPATIENT)
Dept: SURGERY | Facility: CLINIC | Age: 43
End: 2023-01-27
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
## 2023-01-31 ENCOUNTER — PATIENT OUTREACH (OUTPATIENT)
Dept: SURGERY | Facility: CLINIC | Age: 43
End: 2023-01-31

## 2023-01-31 NOTE — PROGRESS NOTES
CM met with Client  SNAP case discussed  Client reports will not be able to rely on step-dad to take him to [employer] office in [place] because they are not getting along  Client able and willing to go to [employer] office tomorrow morning using public transportation but in need of bus pass  CM provided Client with Bus pass: [id number]  Client encouraged to discuss SNAP and SSI needs to [facility] worker  Housing needs discussed  Client reports cannot stay longer at friend's apartment, asked CM for shelter referral     CM called [facility] ([phone]) to refer Client but there was no response  CM encouraged Client to visit shelter located at [address] [place] [address], referral letter provided  Client agreed to visit shelter today and report outcome  Medical release of information completed

## 2023-02-01 ENCOUNTER — PATIENT OUTREACH (OUTPATIENT)
Dept: SURGERY | Facility: CLINIC | Age: 43
End: 2023-02-01

## 2023-02-03 ENCOUNTER — PATIENT OUTREACH (OUTPATIENT)
Dept: SURGERY | Facility: CLINIC | Age: 43
End: 2023-02-03

## 2023-02-03 NOTE — PROGRESS NOTES
Client called CM to report feeling stressed due to lack of stable housing  CM informed Client that due to his mild cognitive impairment, he might be best suited for supportive housing  CM called [facility] for [address] ([phone]), spoke to reception regarding Client's housing needs  CM transferred to referral line and left message asking for call back  CM also called the [employer] for [employer] ([phone]) and left voicemail asking for call back  CM called Client and provided update

## 2023-02-03 NOTE — PROGRESS NOTES
Client called CM to report had a co-pay issue obtaining mouthwash medication with pharmacy but his mother was able to intervene and get med  Mouthwash was prescribed to treat tooth infection  Client also expressed concern over housing situation  CM called [facility] for [facility] ([phone]) again and left voicemail asking for call back  Need for eye appointment also discussed  CM did online search on [employer] for a eye provider and found Dr Navjot Mosqueda ([phone])    CM called Dr Smiley Durbin but there was no response, message left asking for call back  CM agreed to keep Client up to date

## 2023-02-06 ENCOUNTER — PATIENT OUTREACH (OUTPATIENT)
Dept: SURGERY | Facility: CLINIC | Age: 43
End: 2023-02-06

## 2023-02-07 NOTE — PROGRESS NOTES
Client called DEVIN to report received call from [facility] worker informing that his case was approved  Client checked his benefits and confirmed receiving $560 in food stamps  Client pleased with CM's assistance  SSI case status discussed  CM encouraged Client to contact [facility] office tomorrow morning and inquire about case  Client agreed to do so and report outcome

## 2023-02-10 ENCOUNTER — PATIENT OUTREACH (OUTPATIENT)
Dept: SURGERY | Facility: CLINIC | Age: 43
End: 2023-02-10

## 2023-02-13 ENCOUNTER — PATIENT OUTREACH (OUTPATIENT)
Dept: SURGERY | Facility: CLINIC | Age: 43
End: 2023-02-13

## 2023-02-14 ENCOUNTER — PATIENT OUTREACH (OUTPATIENT)
Dept: SURGERY | Facility: CLINIC | Age: 43
End: 2023-02-14

## 2023-02-15 NOTE — PROGRESS NOTES
CM case conference with PCP and discussed Client's medical/cognitive/physical capacity related to [facility] Medical Assessment Form  Medical Assessment form completed by PCP

## 2023-02-16 NOTE — PROGRESS NOTES
Client called CM to report called [address] office and was told to  (Mrs Claudean Hotter) at the local [address] office located in [place] ([address] [place] [address], [place], [address]) at [phone] Ext [phone]  Client called [facility] office and left message asking for call back  Client asked for CM assistance in contacting [address] worker due to mild cognitive impairment  CM called [employer] worker but there was no response, message left asking for call back  Client expressed interest in working part-time  Client has not made attempt to contact [facility] worker  CM called [facility] worker (Aisha Royalton) at [phone] and left message asking for call back  Client continues to reside temporarily at friend's apartment, helping friend with groceries as a form of payment  CM reiterated to Client re: supportive housing as his best option due to his mild cognitive impairment and how he could be connected to housing program by visiting the [employer] [facility] shelter  Client agreed to talk to his mother and discuss possibility of getting a bigger apartment so he can move in with her  No other need discussed
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
## 2023-02-16 NOTE — PROGRESS NOTES
CM met with Client  Client brought [facility] letters received for review  CM informed Client DHS is requesting information on anyone in his household working or able to work and asked that Client completed and return RAVI application  CM assisted Client complete and mail RAVI application  DHS also sent Medical Evaluation Form ( form) for PCP to complete and submit to [facility] for continuation of food stamp benefits  CM case conference with medical staff and provided medical form  CM agreed to follow up with PCP and update Client

## 2023-02-20 ENCOUNTER — PATIENT OUTREACH (OUTPATIENT)
Dept: SURGERY | Facility: CLINIC | Age: 43
End: 2023-02-20

## 2023-02-21 ENCOUNTER — PATIENT OUTREACH (OUTPATIENT)
Dept: SURGERY | Facility: CLINIC | Age: 43
End: 2023-02-21

## 2023-02-21 PROBLEM — B37.0 THRUSH, ORAL: Status: RESOLVED | Noted: 2021-06-24 | Resolved: 2023-02-21

## 2023-02-22 ENCOUNTER — OFFICE VISIT (OUTPATIENT)
Dept: SURGERY | Facility: CLINIC | Age: 43
End: 2023-02-22

## 2023-02-22 ENCOUNTER — DOCUMENTATION (OUTPATIENT)
Dept: SURGERY | Facility: CLINIC | Age: 43
End: 2023-02-22

## 2023-02-22 ENCOUNTER — PATIENT OUTREACH (OUTPATIENT)
Dept: SURGERY | Facility: CLINIC | Age: 43
End: 2023-02-22

## 2023-02-22 VITALS
SYSTOLIC BLOOD PRESSURE: 114 MMHG | RESPIRATION RATE: 18 BRPM | HEIGHT: 75 IN | HEART RATE: 72 BPM | WEIGHT: 187 LBS | DIASTOLIC BLOOD PRESSURE: 67 MMHG | BODY MASS INDEX: 23.25 KG/M2 | TEMPERATURE: 98.1 F | OXYGEN SATURATION: 96 %

## 2023-02-22 DIAGNOSIS — R41.89 COGNITIVE IMPAIRMENT: ICD-10-CM

## 2023-02-22 DIAGNOSIS — F43.10 PTSD (POST-TRAUMATIC STRESS DISORDER): ICD-10-CM

## 2023-02-22 DIAGNOSIS — F32.89 OTHER DEPRESSION: ICD-10-CM

## 2023-02-22 DIAGNOSIS — B20 CURRENTLY ASYMPTOMATIC HIV INFECTION, WITH HISTORY OF HIV-RELATED ILLNESS (HCC): Primary | ICD-10-CM

## 2023-02-22 DIAGNOSIS — F40.298 FEAR OF DEATH: ICD-10-CM

## 2023-02-22 DIAGNOSIS — Z55.0 ILLITERATE: ICD-10-CM

## 2023-02-22 DIAGNOSIS — F41.9 ANXIETY: ICD-10-CM

## 2023-02-22 SDOH — EDUCATIONAL SECURITY - EDUCATION ATTAINMENT: ILITERACY AND LOW LEVEL LITERACY: Z55.0
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
## 2023-02-22 NOTE — ASSESSMENT & PLAN NOTE
Pt worries about not waking up the next day  Pt sometimes does think about being dead but denies any plan to hurt himself or do any harm to self  Pt is alive because his mother is everything to him    · Recommend trauma specialist  · Referral to trauma specialist
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
## 2023-02-22 NOTE — ASSESSMENT & PLAN NOTE
Doing well on Tivicay and Descovy with an undetectable VL  Pt reports 100% medication compliance on HAART  · Stressed the importance of 100% medication adherence  · Reviewed labs     HIV Counseling:    Viral Load: < 20    CD4 Count: 120      Denies side effects  Stressed the importance of adherence  Continue follow up in the ID clinic with Dr Joss Moore or Dr Severino Concepcion  Reviewed the most recent labs, including the CD4 and viral load  Discussed the risks and benefits of treatment options, instructions for management, importance of treatment adherence, and reduction of risk factors  Educated on possible medication side effects  Counseled on routes of HIV transmission, including the risk of  infection  Emphasized that viral suppression is the best method to prevent HIV transmission  At this time the pt denies the need for HIV testing of anyone in their life  Total encounter time was greater than 35 minutes  Greater than 20 minutes were spent on counseling and patient education  Pt voices understanding and agreement with treatment plan
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
## 2023-02-23 ENCOUNTER — PATIENT OUTREACH (OUTPATIENT)
Dept: SURGERY | Facility: CLINIC | Age: 43
End: 2023-02-23

## 2023-02-23 NOTE — PROGRESS NOTES
CM case conference with PCP and Nutritionist re: Client's HIV, mental health status, food insecurity and barriers to care  CM met with Client  Client adherent to today's PCP appointment, reports adherence to medication  Client met with nutritionist, was provided with food basket and referral to the [facility]'s food pantry  Client expressed feeling sadness about past trauma, challenges related to illiteracy and mild cognitive impairment  Client was provided with [address] referral by [facility] but Client declined  CM informed Client about benefits in engaging in [address] services, reports having trust issues and prefers to keep to himself  Need for cell phone discussed  CM called [employer] to follow up with application  CM spoke to [employer] who reports Client's application lapse and no longer active  Client encouraged to apply again  CM assisted Client complete new [employer] application and submit supportive documents (see attachment)  Client updated on this CM's attempt to contact agencies re: supportive housing referral with no response  Eye evaluation appt need discussed  CM will continue to assist Client  No other needs reported

## 2023-02-23 NOTE — PROGRESS NOTES
CM called Dr Scott Lindsay office ([phone]) and coordinated eye evaluation appointment for 3/15/2023 at 10 AM located at [address] [place] [address] Samson Souza [place]   CM called Client and provided appt information  Client asked for appt info to be text to him and also asked for assistance in contacting [address] office for SSI case follow up  CM text eye appt information to Client  CM called [address] worker (Ms Urrutia Dire: [phone] ext, [phone]) and left message asking for call back to discuss Client's SSI case  Client agreed to update CM if SSA call is received

## 2023-02-23 NOTE — PROGRESS NOTES
[facility] Encounter       Moises Walton is a 40 y o  male  seen by RD in conjunction with PCP f/u       CD4 count:  120  Viral load:  <20  ART:   Descovy and Tivicay    Typical food/beverage intake:  · Did not discuss at present  · Admitted to not having food at home right now  · Does have can opener     Food Insecurity: Food Insecurity Present   • Worried About Running Out of Food in the Last Year: Often true   • Ran Out of Food in the Last Year: Often true         Weight history: Wt Readings from Last 3 Encounters:   02/22/23 84 8 kg (187 lb)   01/10/23 83 5 kg (184 lb)   11/29/22 83 5 kg (184 lb)     Wt stable, BMI 23 37      Nutrition Diagnosis    Problem: impaired ability to prepare foods/meals  limited access to food or water     Related to: lack of food planning, purchasing, and preparation skills, lack of or limited access to foods  and stressful life event or living situation     As Evidenced By: economic constraints that limit food availability , patient interview  and lack of transportation or other community constraints       Nutrition Intervention/Recommendations    Nutrition education intervention: provided    Nutrition recommendations: F/u with [facility] for food pantry appt, work with [facility] CM and or GAUDENCIO for pantry assistance at [facility]     Supplement recommendations: No supplement recommendations at this time      Teaching Method: verbal     Person Educated: patient      Comprehension: good    Receptivity: good    Expected compliance: good      Collaboration/referral of nutrition care:   Emergency Food Box   (Food pantry appt) food pantry locations [facility])      Goals:  1  Improve access to food   -Make an appt with [address] [place] [address]   -F/u with pantry on monthly basis as needed  -Close communication with [facility] CM        Monitoring/Evaluation:  1    Food access       Visit Summary  RD met with Moises Walton in conjunction with PCP appt, as pt had screen positive for food insecurities  Encounter was with pt and VIJAY Don Dear , per pt preference  Simba Felix does not had much food at home presently  CM reported that pt lives between two homes, with mom and with friend  Receives small amount of SNAP  Simba Felix has been to food pantry but felt judged and awkward  He is familiar with the [facility] pantry and has been there before for food  He is willing to make an appt, and go with VIJAY BELTRAN and GAUDENCIO  Pt accepted emergency food bag at present encounter  He had no further questions or concerns to share with RD at this time  Continue to f/u with pt for food insecurities        Linda Doyle, MS, RD, LDN

## 2023-02-27 ENCOUNTER — PATIENT OUTREACH (OUTPATIENT)
Dept: SURGERY | Facility: CLINIC | Age: 43
End: 2023-02-27

## 2023-02-28 ENCOUNTER — PATIENT OUTREACH (OUTPATIENT)
Dept: SURGERY | Facility: CLINIC | Age: 43
End: 2023-02-28

## 2023-02-28 NOTE — PROGRESS NOTES
Client called to report was able to use $50 gift card to purchase new pair of shoes  Client thankful for the assistance  SSI case discussed  CM encouraged Client to follow up with SSI case by contacting [facility] worker (Ms Babak Gonsalves: Neli Ely, [phone])  Client agreed to do so   referral discussed to assist with SSI benefits issue  Client in agreement with referral   CM called [employer] ([phone]) and left message asking for call back  CM also sent e-mail to [facility] [employer] [facility] ([email or web address]) requesting assistance with referral process  CM discussed case with  during supervision

## 2023-03-06 NOTE — PROGRESS NOTES
CM called Client  Client reminded of PCP follow up appointment scheduled for 2/22/2023  SSI goal discussed  Client has not heard from [place] [facility] [place] office re: request for reinstatement of benefits  CM and Client called [facility] worker (Ms Hattie Rincon: [phone]) to follow up with SSI case status but there was no response, message left asking for call back

## 2023-03-06 NOTE — PROGRESS NOTES
Client called CM to report received mail from [facility] and left with Clinic reception  CM received mail in [address] revised letters and informed Client of the followin  DHS letter informing Client about the RICARDO program in which he is required to inform DHS of any changes  Client reports no changes to report  2  Second letter was a voting registration application  Client reports not interested in completing voter registration at this time  Client reports tried to reach out to [facility] worker again and left message  Client reports will visit [facility] [place] office in person next week and report outcome

## 2023-03-07 ENCOUNTER — PATIENT OUTREACH (OUTPATIENT)
Dept: SURGERY | Facility: CLINIC | Age: 43
End: 2023-03-07

## 2023-03-08 NOTE — PROGRESS NOTES
Client called CM to report tried several times to contact [address] worker (Ms Clay Lay; [phone] x [phone]) without success  Client asked CM for assistance contacting [address] worker  CM called and case conference with [address] worker who explained they are short on staff and for this reason it is taking longer than usual to respond to requests  [address] worker acknowledge receiving client's documents regarding identity theft and will meet with her supervisor to discuss request for reinstatement of SSI benefits  [address] worker agreed to reach out to Client for update next week  Client in agreement

## 2023-03-13 ENCOUNTER — PATIENT OUTREACH (OUTPATIENT)
Dept: SURGERY | Facility: CLINIC | Age: 43
End: 2023-03-13

## 2023-03-14 ENCOUNTER — PATIENT OUTREACH (OUTPATIENT)
Dept: SURGERY | Facility: CLINIC | Age: 43
End: 2023-03-14

## 2023-03-15 NOTE — PROGRESS NOTES
Client called CM to report received call from an unknown provider informing of upcoming appt for tomorrow  Client was unable to determine what type of appt due to communication issues related to mild cognitive impairment but able to provide phone number: [phone]  CM informed Client call was from eye provider reminding him of tomorrow's eye evaluation appointment at 10 AM    CM inquired re: Client's transportation needs  Client reports able to ride his scooter to appointment  CM encouraged Client to call this CM if communication at appt becomes an issue  Client verbalized understanding

## 2023-03-15 NOTE — PROGRESS NOTES
Client called CM to report has not received a response from [address] worker re: SSI case  Client reports struggling with food insecurity and has other needs such as clothing which he cannot get because his SSI benefit was discontinued due to identity theft  CM encouraged Client to visit [email or web address] office in person and inquire re: case status and if its more feasible for Client to submit new SSI application instead  Client agreed to ask step-dad to take him to [facility] office this Friday and will report outcome  CM also encouraged Client to contact the [address] in order to schedule appt to obtain food box at [phone]  Client agreed to call

## 2023-03-22 ENCOUNTER — PATIENT OUTREACH (OUTPATIENT)
Dept: SURGERY | Facility: CLINIC | Age: 43
End: 2023-03-22

## 2023-03-22 ENCOUNTER — OFFICE VISIT (OUTPATIENT)
Dept: SURGERY | Facility: CLINIC | Age: 43
End: 2023-03-22

## 2023-03-22 VITALS
TEMPERATURE: 96.5 F | HEIGHT: 75 IN | HEART RATE: 85 BPM | BODY MASS INDEX: 23.57 KG/M2 | DIASTOLIC BLOOD PRESSURE: 79 MMHG | SYSTOLIC BLOOD PRESSURE: 128 MMHG | RESPIRATION RATE: 16 BRPM | OXYGEN SATURATION: 99 % | WEIGHT: 189.6 LBS

## 2023-03-22 DIAGNOSIS — R41.89 COGNITIVE IMPAIRMENT: ICD-10-CM

## 2023-03-22 DIAGNOSIS — Z12.5 SCREENING FOR PROSTATE CANCER: ICD-10-CM

## 2023-03-22 DIAGNOSIS — Z29.8 NEED FOR PNEUMOCYSTIS PROPHYLAXIS: ICD-10-CM

## 2023-03-22 DIAGNOSIS — B20 CURRENTLY ASYMPTOMATIC HIV INFECTION, WITH HISTORY OF HIV-RELATED ILLNESS (HCC): Primary | ICD-10-CM

## 2023-03-22 DIAGNOSIS — Z00.00 ANNUAL PHYSICAL EXAM: ICD-10-CM

## 2023-03-22 DIAGNOSIS — T65.222D TOXIC EFFECT OF TOBACCO CIGARETTE, INTENTIONAL SELF-HARM, SUBSEQUENT ENCOUNTER: ICD-10-CM

## 2023-03-22 DIAGNOSIS — K08.9 POOR DENTITION: ICD-10-CM

## 2023-03-22 DIAGNOSIS — K06.1 GUM HYPERPLASIA: ICD-10-CM

## 2023-03-22 DIAGNOSIS — D72.810 LYMPHOPENIA: ICD-10-CM

## 2023-03-22 RX ORDER — CHLORHEXIDINE GLUCONATE 0.12 MG/ML
15 RINSE ORAL 2 TIMES DAILY
Qty: 120 ML | Refills: 2 | Status: SHIPPED | OUTPATIENT
Start: 2023-03-22

## 2023-03-22 NOTE — ASSESSMENT & PLAN NOTE
Continues to smoke but is interested in the injection for smoking cessation  · Stressed the importance of complete smoking cessation  Nicotine Dependency - Primary    · Counseled for greater than 15 minutes on the importance of smoking cessation  Education was given regarding the cardiovascular effects of how nicotine affects the heart, lungs, kidneys, and peripheral vascular system    Referred to [address] for enrollment in smoking cessation program     · Will look into injection and insurance coverage

## 2023-03-22 NOTE — ASSESSMENT & PLAN NOTE
Still working on getting into a dentist   Pt is working with CM    · Continue to brush teeth and floss daily  · Follow up with CM  · Continue Chlorhexidine mouthwash

## 2023-03-22 NOTE — ASSESSMENT & PLAN NOTE
Doing well on Descovy and Tivicay with an undetectable VL  Pt reports 100% medication compliance on HAART  · Stressed the importance of 100% medication adherence  · CD4 count is slowly coming up now above 100; but pt has remained undetectable since 2022  · Continue to monitor CD4 and VL    HIV Counseling:    Viral Load: < 20    CD4 Count: 120      Denies side effects  Stressed the importance of adherence  Continue follow up in the ID clinic with Dr Christian Jacob or Dr Todd Lancaster  Reviewed the most recent labs, including the CD4 and viral load  Discussed the risks and benefits of treatment options, instructions for management, importance of treatment adherence, and reduction of risk factors  Educated on possible medication side effects  Counseled on routes of HIV transmission, including the risk of  infection  Emphasized that viral suppression is the best method to prevent HIV transmission  At this time the pt denies the need for HIV testing of anyone in their life  Total encounter time was greater than 35 minutes  Greater than 20 minutes were spent on counseling and patient education  Pt voices understanding and agreement with treatment plan

## 2023-03-22 NOTE — PATIENT INSTRUCTIONS
Wellness Visit for Adults   AMBULATORY CARE:   A wellness visit  is when you see your healthcare provider to get screened for health problems  Your healthcare provider will also give you advice on how to stay healthy  Write down your questions so you remember to ask them  Ask your healthcare provider how often you should have a wellness visit  What happens at a wellness visit:  Your healthcare provider will ask about your health, and your family history of health problems  This includes high blood pressure, heart disease, and cancer  He or she will ask if you have symptoms that concern you, if you smoke, and about your mood  You may also be asked about your intake of medicines, supplements, food, and alcohol  Any of the following may be done:  • Your weight  will be checked  Your height may also be checked so your body mass index (BMI) can be calculated  Your BMI shows if you are at a healthy weight  • Your blood pressure  and heart rate will be checked  Your temperature may also be checked  • Blood and urine tests  may be done  Blood tests may be done to check your cholesterol levels  Abnormal cholesterol levels increase your risk for heart disease and stroke  You may also need a blood or urine test to check for diabetes if you are at increased risk  Urine tests may be done to look for signs of an infection or kidney disease  • A physical exam  includes checking your heartbeat and lungs with a stethoscope  Your healthcare provider may also check your skin to look for sun damage  • Screening tests  may be recommended  A screening test is done to check for diseases that may not cause symptoms  The screening tests you may need depend on your age, gender, family history, and lifestyle habits  For example, colorectal screening may be recommended if you are 48years old or older  Screening tests you need if you are a woman:   • A Pap smear  is used to screen for cervical cancer   Pap smears are usually done every 3 to 5 years depending on your age  You may need them more often if you have had abnormal Pap smear test results in the past  Ask your healthcare provider how often you should have a Pap smear  • A mammogram  is an x-ray of your breasts to screen for breast cancer  Experts recommend mammograms every 2 years starting at age 48 years  You may need a mammogram at age 52 years or younger if you have an increased risk for breast cancer  Talk to your healthcare provider about when you should start having mammograms and how often you need them  Vaccines you may need:   • Get an influenza vaccine  every year  The influenza vaccine protects you from the flu  Several types of viruses cause the flu  The viruses change over time, so new vaccines are made each year  • Get a tetanus-diphtheria (Td) booster vaccine  every 10 years  This vaccine protects you against tetanus and diphtheria  Tetanus is a severe infection that may cause painful muscle spasms and lockjaw  Diphtheria is a severe bacterial infection that causes a thick covering in the back of your mouth and throat  • Get a human papillomavirus (HPV) vaccine  if you are female and aged 23 to 32 or male 23 to 24 and never received it  This vaccine protects you from HPV infection  HPV is the most common infection spread by sexual contact  HPV may also cause vaginal, penile, and anal cancers  • Get a pneumococcal vaccine  if you are aged 72 years or older  The pneumococcal vaccine is an injection given to protect you from pneumococcal disease  Pneumococcal disease is an infection caused by pneumococcal bacteria  The infection may cause pneumonia, meningitis, or an ear infection  • Get a shingles vaccine  if you are 60 or older, even if you have had shingles before  The shingles vaccine is an injection to protect you from the varicella-zoster virus  This is the same virus that causes chickenpox   Shingles is a painful rash that develops in people who had chickenpox or have been exposed to the virus  How to eat healthy:  My Plate is a model for planning healthy meals  It shows the types and amounts of foods that should go on your plate  Fruits and vegetables make up about half of your plate, and grains and protein make up the other half  A serving of dairy is included on the side of your plate  The amount of calories and serving sizes you need depends on your age, gender, weight, and height  Examples of healthy foods are listed below:  • Eat a variety of vegetables  such as dark green, red, and orange vegetables  You can also include canned vegetables low in sodium (salt) and frozen vegetables without added butter or sauces  • Eat a variety of fresh fruits , canned fruit in 100% juice, frozen fruit, and dried fruit  • Include whole grains  At least half of the grains you eat should be whole grains  Examples include whole-wheat bread, wheat pasta, brown rice, and whole-grain cereals such as oatmeal     • Eat a variety of protein foods such as seafood (fish and shellfish), lean meat, and poultry without skin (turkey and chicken)  Examples of lean meats include pork leg, shoulder, or tenderloin, and beef round, sirloin, tenderloin, and extra lean ground beef  Other protein foods include eggs and egg substitutes, beans, peas, soy products, nuts, and seeds  • Choose low-fat dairy products such as skim or 1% milk or low-fat yogurt, cheese, and cottage cheese  • Limit unhealthy fats  such as butter, hard margarine, and shortening  Exercise:  Exercise at least 30 minutes per day on most days of the week  Some examples of exercise include walking, biking, dancing, and swimming  You can also fit in more physical activity by taking the stairs instead of the elevator or parking farther away from stores  Include muscle strengthening activities 2 days each week  Regular exercise provides many health benefits   It helps you manage your weight, and decreases your risk for type 2 diabetes, heart disease, stroke, and high blood pressure  Exercise can also help improve your mood  Ask your healthcare provider about the best exercise plan for you  General health and safety guidelines:   • Do not smoke  Nicotine and other chemicals in cigarettes and cigars can cause lung damage  Ask your healthcare provider for information if you currently smoke and need help to quit  E-cigarettes or smokeless tobacco still contain nicotine  Talk to your healthcare provider before you use these products  • Limit alcohol  A drink of alcohol is 12 ounces of beer, 5 ounces of wine, or 1½ ounces of liquor  • Lose weight, if needed  Being overweight increases your risk of certain health conditions  These include heart disease, high blood pressure, type 2 diabetes, and certain types of cancer  • Protect your skin  Do not sunbathe or use tanning beds  Use sunscreen with a SPF 15 or higher  Apply sunscreen at least 15 minutes before you go outside  Reapply sunscreen every 2 hours  Wear protective clothing, hats, and sunglasses when you are outside  • Drive safely  Always wear your seatbelt  Make sure everyone in your car wears a seatbelt  A seatbelt can save your life if you are in an accident  Do not use your cell phone when you are driving  This could distract you and cause an accident  Pull over if you need to make a call or send a text message  • Practice safe sex  Use latex condoms if are sexually active and have more than one partner  Your healthcare provider may recommend screening tests for sexually transmitted infections (STIs)  • Wear helmets, lifejackets, and protective gear  Always wear a helmet when you ride a bike or motorcycle, go skiing, or play sports that could cause a head injury  Wear protective equipment when you play sports  Wear a lifejacket when you are on a boat or doing water sports      © Copyright [employer] 2022 Information is for End User's use only and may not be sold, redistributed or otherwise used for commercial purposes  The above information is an  only  It is not intended as medical advice for individual conditions or treatments  Talk to your doctor, nurse or pharmacist before following any medical regimen to see if it is safe and effective for you  Obesity   AMBULATORY CARE:   Obesity  means your body mass index (BMI) is greater than 30  Your healthcare provider will use your age, height, and weight to measure your BMI  The risks of obesity include  many health problems, including injuries or physical disability  • Diabetes (high blood sugar level)    • High blood pressure or high cholesterol    • Heart disease    • Stroke    • Gallbladder or liver disease    • Cancer of the colon, breast, prostate, liver, or kidney    • Sleep apnea    • Arthritis or gout    Screening  is done to check for health conditions before you have signs or symptoms  If you are 28to 79years old, your blood sugar level may be checked every 3 years for signs of prediabetes or diabetes  Your healthcare provider will check your blood pressure at each visit  High blood pressure can lead to a stroke or other problems  Your provider may check for signs of heart disease, cancer, or other health problems  Seek care immediately if:   • You have a severe headache, confusion, or difficulty speaking  • You have weakness on one side of your body  • You have chest pain, sweating, or shortness of breath  Call your doctor if:   • You have symptoms of gallbladder or liver disease, such as pain in your upper abdomen  • You have knee or hip pain and discomfort while walking  • You have symptoms of diabetes, such as intense hunger and thirst, and frequent urination  • You have symptoms of sleep apnea, such as snoring or daytime sleepiness  • You have questions or concerns about your condition or care      Treatment for obesity  focuses on helping you lose weight to improve your health  Even a small decrease in BMI can reduce the risk for many health problems  Your healthcare provider will help you set a weight-loss goal   • Lifestyle changes  are the first step in treating obesity  These include making healthy food choices and getting regular physical activity  Your healthcare provider may suggest a weight-loss program that involves coaching, education, and therapy  • Medicine  may help you lose weight when it is used with a healthy foods and physical activity  • Surgery  can help you lose weight if you have obesity along with other health problems  Several types of weight-loss surgery are available  Ask your healthcare provider for more information  Tips for safe weight loss:   • Set small, realistic goals  An example of a small goal is to walk for 20 minutes 5 days a week  Anther goal is to lose 5% of your body weight  • Ask for support  Tell friends, family members, and coworkers about your goals  Ask someone to lose weight with you  You may also want to join a weight-loss support group  • Identify foods or triggers that may cause you to overeat  Remove tempting high-calorie foods from your home and workplace  Place a bowl of fresh fruit on your kitchen counter  If stress causes you to eat, find other ways to cope with stress  A counselor or therapist may be able to help you  • Track your daily calories and activity  Write down what you eat and drink  Also write down how many minutes of physical activity you do each day  • Track your weekly weight  Weigh yourself in the morning, before you eat or drink anything but after you use the bathroom  Use the same scale, in the same place, and in similar clothing each time  Only weigh yourself 1 to 2 times each week, or as directed  You may become discouraged if you weigh yourself every day  Eating changes:   You will need to eat 500 to 1,000 fewer calories each day than you currently eat to lose 1 to 2 pounds a week  The following changes will help you cut calories:  • Eat smaller portions  Use small plates, no larger than 9 inches in diameter  Fill your plate half full of fruits and vegetables  Measure your food using measuring cups until you know what a serving size looks like  • Eat 3 meals and 1 or 2 snacks each day  Plan your meals in advance  Caio Garnett and eat at home most of the time  Eat slowly  Do not skip meals  Skipping meals can lead to overeating later in the day  This can make it harder for you to lose weight  Talk with a dietitian to help you make a meal plan and schedule that is right for you  • Eat fruits and vegetables at every meal   They are low in calories and high in fiber, which makes you feel full  Do not add butter, margarine, or cream sauce to vegetables  Use herbs to season steamed vegetables  • Eat less fat and fewer fried foods  Eat more baked or grilled chicken and fish  These protein sources are lower in calories and fat than red meat  Limit fast food  Dress your salads with olive oil and vinegar instead of bottled dressing  • Limit the amount of sugar you eat  Do not drink sugary beverages  Limit alcohol  Activity changes:  Physical activity is good for your body in many ways  It helps you burn calories and build strong muscles  It decreases stress and depression, and improves your mood  It can also help you sleep better  Talk to your healthcare provider before you begin an exercise program   • Exercise for at least 30 minutes 5 days a week  Start slowly  Set aside time each day for physical activity that you enjoy and that is convenient for you  It is best to do both weight training and an activity that increases your heart rate, such as walking, bicycling, or swimming  • Find ways to be more active  Do yard work and housecleaning  Walk up the stairs instead of using elevators   Spend your leisure time going to events that require walking, such as outdoor festivals or fairs  This extra physical activity can help you lose weight and keep it off  Follow up with your doctor as directed: You may need to meet with a dietitian  Write down your questions so you remember to ask them during your visits  © Jenn Amaral 2022 Information is for End User's use only and may not be sold, redistributed or otherwise used for commercial purposes  The above information is an  only  It is not intended as medical advice for individual conditions or treatments  Talk to your doctor, nurse or pharmacist before following any medical regimen to see if it is safe and effective for you  Cigarette Smoking and Your Health   AMBULATORY CARE:   Risks to your health if you smoke:  Nicotine and other chemicals found in tobacco and e-cigarettes can damage every cell in your body  Even if you are a light smoker, you have an increased risk for cancer, heart disease, and lung disease  If you are pregnant or have diabetes, smoking increases your risk for complications  Nicotine can affect an adolescent's developing brain  This can lead to trouble thinking, learning, or paying attention  Benefits to your health if you stop smoking:   • You decrease respiratory symptoms such as coughing, wheezing, and shortness of breath  • You reduce your risk for cancers of the lung, mouth, throat, kidney, bladder, pancreas, stomach, and cervix  If you already have cancer, you increase the benefits of chemotherapy  You also reduce your risk for cancer returning or a second cancer from developing  • You reduce your risk for heart disease, blood clots, heart attack, and stroke  • You reduce your risk for lung infections, and diseases such as pneumonia, asthma, chronic bronchitis, and emphysema  • Your circulation improves  More oxygen can be delivered to your body   If you have diabetes, you lower your risk for complications, such as kidney, artery, and eye diseases  You also lower your risk for nerve damage  Nerve damage can lead to amputations, poor vision, and blindness  • You improve your body's ability to heal and to fight infections  • An adolescent can help his or her brain and body develop in a healthy way  Talk to your adolescent about all the health risks of nicotine  If you can, start talking about nicotine when your child is younger than 12 years  This may make it easier for him or her not to start using nicotine as a teenager or adult  Explain to him or her that it is best never to start  It can be hard to try to quit later  Benefits to the health of others if you stop smoking:  Tobacco is harmful to nonsmokers who breathe in your secondhand smoke  The following are ways the health of others around you may improve when you stop smoking:  • You lower the risks for lung cancer and heart disease in nonsmoking adults  • If you are pregnant, you lower the risk for miscarriage, early delivery, low birth weight, and stillbirth  You also lower your baby's risk for SIDS, obesity, developmental delay, and neurobehavioral problems, such as ADHD  • If you have children, you lower their risk for ear infections, colds, pneumonia, bronchitis, and asthma  Follow up with your doctor as directed:  Write down your questions so you remember to ask them during your visits  For support and more information:   • [employer]  [address] [facility]  [address]  Phone: [facility] [address]  Phone: [phone] 675 [phone] [phone]  Web Address: [place]    • Smokefree  gov  Phone: 4- 746 - 884-2838  Web Address: www smokefree  gov  © Copyright Delia Prophet 2022 Information is for End User's use only and may not be sold, redistributed or otherwise used for commercial purposes  The above information is an  only  It is not intended as medical advice for individual conditions or treatments   Talk to your doctor, nurse or pharmacist before following any medical regimen to see if it is safe and effective for you  Cholesterol and Your Health   AMBULATORY CARE:   Cholesterol  is a waxy, fat-like substance  Your body uses cholesterol to make hormones and new cells, and to protect nerves  Cholesterol is made by your body  It also comes from certain foods you eat, such as meat and dairy products  Your healthcare provider can help you set goals for your cholesterol levels  He or she can help you create a plan to meet your goals  Cholesterol level goals: Your cholesterol level goals depend on your risk for heart disease, your age, and your other health conditions  The following are general guidelines:  • Total cholesterol  includes low-density lipoprotein (LDL), high-density lipoprotein (HDL), and triglyceride levels  The total cholesterol level should be lower than 200 mg/dL and is best at about 150 mg/dL  • LDL cholesterol  is called bad cholesterol  because it forms plaque in your arteries  As plaque builds up, your arteries become narrow, and less blood flows through  When plaque decreases blood flow to your heart, you may have chest pain  If plaque completely blocks an artery that brings blood to your heart, you may have a heart attack  Plaque can break off and form blood clots  Blood clots may block arteries in your brain and cause a stroke  The level should be less than 130 mg/dL and is best at about 100 mg/dL  • HDL cholesterol  is called good cholesterol  because it helps remove LDL cholesterol from your arteries  It does this by attaching to LDL cholesterol and carrying it to your liver  Your liver breaks down LDL cholesterol so your body can get rid of it  High levels of HDL cholesterol can help prevent a heart attack and stroke  Low levels of HDL cholesterol can increase your risk for heart disease, heart attack, and stroke  The level should be 60 mg/dL or higher      • Triglycerides  are a type of fat that store energy from foods you eat  High levels of triglycerides also cause plaque buildup  This can increase your risk for a heart attack or stroke  If your triglyceride level is high, your LDL cholesterol level may also be high  The level should be less than 150 mg/dL  Any of the following can increase your risk for high cholesterol:   • Smoking cigarettes    • Being overweight or obese, or not getting enough exercise    • Drinking large amounts of alcohol    • A medical condition such as hypertension (high blood pressure) or diabetes    • Certain genes passed from your parents to you    • Age older than 65 years    What you need to know about having your cholesterol levels checked: Adults 21to 39years of age should have their cholesterol levels checked every 4 to 6 years  Adults 45 years or older should have their cholesterol checked every 1 to 2 years  You may need your cholesterol checked more often, or at a younger age, if you have risk factors for heart disease  You may also need to have your cholesterol checked more often if you have other health conditions, such as diabetes  Blood tests are used to check cholesterol levels  Blood tests measure your levels of triglycerides, LDL cholesterol, and HDL cholesterol  How healthy fats affect your cholesterol levels:  Healthy fats, also called unsaturated fats, help lower LDL cholesterol and triglyceride levels  Healthy fats include the following:  • Monounsaturated fats  are found in foods such as olive oil, canola oil, avocado, nuts, and olives  • Polyunsaturated fats,  such as omega 3 fats, are found in fish, such as salmon, trout, and tuna  They can also be found in plant foods such as flaxseed, walnuts, and soybeans  How unhealthy fats affect your cholesterol levels:  Unhealthy fats increase LDL cholesterol and triglyceride levels   They are found in foods high in cholesterol, saturated fat, and trans fat:  • Cholesterol  is found in eggs, dairy, and meat     • Saturated fat  is found in butter, cheese, ice cream, whole milk, and coconut oil  Saturated fat is also found in meat, such as sausage, hot dogs, and bologna  • Trans fat  is found in liquid oils and is used in fried and baked foods  Foods that contain trans fats include chips, crackers, muffins, sweet rolls, microwave popcorn, and cookies  Treatment  for high cholesterol will also decrease your risk of heart disease, heart attack, and stroke  Treatment may include any of the following:  • Lifestyle changes  may include food, exercise, weight loss, and quitting smoking  You may also need to decrease the amount of alcohol you drink  Your healthcare provider will want you to start with lifestyle changes  Other treatment may be added if lifestyle changes are not enough  Your healthcare provider may recommend you work with a team to manage hyperlipidemia  The team may include medical experts such as a dietitian, an exercise or physical therapist, and a behavior therapist  Your family members may be included in helping you create lifestyle changes  • Medicines  may be given to lower your LDL cholesterol, triglyceride levels, or total cholesterol level  You may need medicines to lower your cholesterol if any of the following is true:    ? You have a history of stroke, TIA, unstable angina, or a heart attack  ? Your LDL cholesterol level is 190 mg/dL or higher  ? You are age 36 to 76 years, have diabetes or heart disease risk factors, and your LDL cholesterol is 70 mg/dL or higher  • Supplements  include fish oil, red yeast rice, and garlic  Fish oil may help lower your triglyceride and LDL cholesterol levels  It may also increase your HDL cholesterol level  Red yeast rice may help decrease your total cholesterol level and LDL cholesterol level  Garlic may help lower your total cholesterol level  Do not take any supplements without talking to your healthcare provider      Food changes you can make to lower your cholesterol levels:  A dietitian can help you create a healthy eating plan  He or she can show you how to read food labels and choose foods low in saturated fat, trans fats, and cholesterol  • Decrease the total amount of fat you eat  Choose lean meats, fat-free or 1% fat milk, and low-fat dairy products, such as yogurt and cheese  Try to limit or avoid red meats  Limit or do not eat fried foods or baked goods, such as cookies  • Replace unhealthy fats with healthy fats  Cook foods in olive oil or canola oil  Choose soft margarines that are low in saturated fat and trans fat  Seeds, nuts, and avocados are other examples of healthy fats  • Eat foods with omega-3 fats  Examples include salmon, tuna, mackerel, walnuts, and flaxseed  Eat fish 2 times per week  Pregnant women should not eat fish that have high levels of mercury, such as shark, swordfish, and johnathan mackerel  • Increase the amount of high-fiber foods you eat  High-fiber foods can help lower your LDL cholesterol  Aim to get between 20 and 30 grams of fiber each day  Fruits and vegetables are high in fiber  Eat at least 5 servings each day  Other high-fiber foods are whole-grain or whole-wheat breads, pastas, or cereals, and brown rice  Eat 3 ounces of whole-grain foods each day  Increase fiber slowly  You may have abdominal discomfort, bloating, and gas if you add fiber to your diet too quickly  • Eat healthy protein foods  Examples include low-fat dairy products, skinless chicken and turkey, fish, and nuts  • Limit foods and drinks that are high in sugar  Your dietitian or healthcare provider can help you create daily limits for high-sugar foods and drinks  The limit may be lower if you have diabetes or another health condition  Limits can also help you lose weight if needed  Lifestyle changes you can make to lower your cholesterol levels:   • Maintain a healthy weight    Ask your healthcare provider what a healthy weight is for you  Ask him or her to help you create a weight loss plan if needed  Weight loss can decrease your total cholesterol and triglyceride levels  Weight loss may also help keep your blood pressure at a healthy level  • Be physically active throughout the day  Physical activity, such as exercise, can help lower your total cholesterol level and maintain a healthy weight  Physical activity can also help increase your HDL cholesterol level  Work with your healthcare provider to create an program that is right for you  Get at least 30 to 40 minutes of moderate physical activity most days of the week  Examples of exercise include brisk walking, swimming, or biking  Also include strength training at least 2 times each week  Your healthcare providers can help you create a physical activity plan  • Do not smoke  Nicotine and other chemicals in cigarettes and cigars can raise your cholesterol levels  Ask your healthcare provider for information if you currently smoke and need help to quit  E-cigarettes or smokeless tobacco still contain nicotine  Talk to your healthcare provider before you use these products  • Limit or do not drink alcohol  Alcohol can increase your triglyceride levels  Ask your healthcare provider before you drink alcohol  Ask how much is okay for you to drink in 24 hours or 1 week  Follow up with your doctor as directed:  Write down your questions so you remember to ask them during your visits  © Copyright Vidya Lam 2022 Information is for End User's use only and may not be sold, redistributed or otherwise used for commercial purposes  The above information is an  only  It is not intended as medical advice for individual conditions or treatments  Talk to your doctor, nurse or pharmacist before following any medical regimen to see if it is safe and effective for you

## 2023-03-22 NOTE — ASSESSMENT & PLAN NOTE
Feeling fine    WBC: 3 68/ANC: 2 19  · Continue to monitor  · No need for further workup since ANC > 1000

## 2023-03-28 ENCOUNTER — PATIENT OUTREACH (OUTPATIENT)
Dept: SURGERY | Facility: CLINIC | Age: 43
End: 2023-03-28

## 2023-03-28 NOTE — PROGRESS NOTES
Client called CM expressing frustration with SSA office because he has not received any response from previous multiple attempts at having SSI benefit issue resolved  Client asked CM for assistance in submitting new SSI application via online at [facility] DANDY BELTRAN assisted Client in creating SSA  GOV user account but unable to do so due to communication issues with Client (Client diagnosed with mild cognitive impairment)  CM and Client agreed to meet tomorrow at 11 AM during office visit and attempt to complete user account/SSI application

## 2023-03-30 ENCOUNTER — PATIENT OUTREACH (OUTPATIENT)
Dept: SURGERY | Facility: CLINIC | Age: 43
End: 2023-03-30

## 2023-03-31 ENCOUNTER — PATIENT OUTREACH (OUTPATIENT)
Dept: SURGERY | Facility: CLINIC | Age: 43
End: 2023-03-31

## 2023-04-13 ENCOUNTER — PATIENT OUTREACH (OUTPATIENT)
Dept: SURGERY | Facility: CLINIC | Age: 43
End: 2023-04-13

## 2023-04-18 ENCOUNTER — PATIENT OUTREACH (OUTPATIENT)
Dept: SURGERY | Facility: CLINIC | Age: 43
End: 2023-04-18

## 2023-04-19 ENCOUNTER — PATIENT OUTREACH (OUTPATIENT)
Dept: SURGERY | Facility: CLINIC | Age: 43
End: 2023-04-19

## 2023-04-19 PROBLEM — F33.2 SEVERE EPISODE OF RECURRENT MAJOR DEPRESSIVE DISORDER, WITHOUT PSYCHOTIC FEATURES (HCC): Status: ACTIVE | Noted: 2021-05-20

## 2023-04-20 ENCOUNTER — PATIENT OUTREACH (OUTPATIENT)
Dept: SURGERY | Facility: CLINIC | Age: 43
End: 2023-04-20

## 2023-04-24 ENCOUNTER — PATIENT OUTREACH (OUTPATIENT)
Dept: SURGERY | Facility: CLINIC | Age: 43
End: 2023-04-24

## 2023-04-24 NOTE — PROGRESS NOTES
Client called CM to report has not received [employer] phone, asked CM for assistance contacting phone company to inquire  CM called [employer] ([phone]) but unable to reach anyone  CM agreed to try again tomorrow  Psych referral discussed  Client reports waiting for call from [facility] psych department with evaluation appointment  Client agreed to inform this CM if call is received

## 2023-04-25 ENCOUNTER — PATIENT OUTREACH (OUTPATIENT)
Dept: SURGERY | Facility: CLINIC | Age: 43
End: 2023-04-25

## 2023-04-25 NOTE — PROGRESS NOTES
CM called OVR program ([phone]) in attempt to place referral  Message left asking for call back  CM called Client and made attempt to place referral via [facility] but Client's case could not be authenticated  Need for Social Security  referral discussed  CM called [facility] ([phone]), spoke to reception who encouraged CM to call intake line at [phone]  CM called intake line and left message asking for call back  CM explained to Client the need to establish  services as the best option in order to have his SSI benefits reinstated  Client in agreement  Detail Level: Detailed Quality 130: Documentation Of Current Medications In The Medical Record: Current Medications Documented Quality 110: Preventive Care And Screening: Influenza Immunization: Influenza immunization was not ordered or administered, reason not given

## 2023-04-25 NOTE — PROGRESS NOTES
Client's case discussed during staff meeting  CM also met with [facility] and discussed Client's need for intensive case management and Psych referral for evaluation/long term treatment

## 2023-04-25 NOTE — PROGRESS NOTES
CM called Client and discussed OVR Referral     CM called OVR program ([phone]) in attempt to place referral  Message left asking for call back      CM called Client and made attempt to place referral via [facility] but Client's case could not be authenticated  CM reminded Client of today's ID appointment  Client agreed to adhere  Moments later, CM met with Client  CM called [facility] ([phone]) in attempt to place referral  Message left asking for call back  CM agreed to inform Client if call is received from   CM case conference with GAUDENCIO re: Client's food insecurity, SNAP and SSI case status  CM also met with S and discussed Client's need for intensive case management and Psych referral for evaluation/long term treatment

## 2023-04-25 NOTE — PROGRESS NOTES
CM called Client to follow up with [employer] referral status  Client reports has not received [employer] phone  Client expressed frustration with inability to have a phone in spite of efforts made  CM called [employer] ([phone]) to inquire but there was no response

## 2023-04-26 NOTE — PROGRESS NOTES
Client called CM to report received message from dental clinic but could not understand, asked CM for assistance in confirming if he has a dental appointment  CM checked Epic and informed Client he has dental appt scheduled for today at 11 AM      CM encouraged Client to attend appointment   referral discussed  CM called [facility] [employer] and discussed need for referral with reception   CM encouraged to call back tomorrow with Client for referral     CM and Client agreed to meet on 4/21/23 to work on  referral

## 2023-04-27 ENCOUNTER — PATIENT OUTREACH (OUTPATIENT)
Dept: SURGERY | Facility: CLINIC | Age: 43
End: 2023-04-27

## 2023-04-27 NOTE — PROGRESS NOTES
CM contacted Client  CM assisted Client update demographic information on the [employer] online account and in completing/submitting online [employer] application and supportive document: Medicaid Card  CM called [facility] Program ([phone]), spoke to [address] worker aBshir Max) and completed referral     Worker reports referral will be processed within 10 to 14 days, Client will then received OVR referral approval notice in the mail along with name of counselor assigned  Client pleased with assistance

## 2023-05-10 ENCOUNTER — PATIENT OUTREACH (OUTPATIENT)
Dept: SURGERY | Facility: CLINIC | Age: 43
End: 2023-05-10

## 2023-05-11 ENCOUNTER — PATIENT OUTREACH (OUTPATIENT)
Dept: SURGERY | Facility: CLINIC | Age: 43
End: 2023-05-11

## 2023-05-11 NOTE — PROGRESS NOTES
DEVIN case conference with  Rcihy Paiz) who reports called Client to remind him of today's scheduled oral surgery appt at [facility] and assist with LYFT, however, Client refused  LYFT and to attend appointment  CM agreed to follow up with Client  CM called Client  Client reports received [facility] cell phone number: [phone]  CM updated Client's contact information in Epic  Client reports feeling depressed, did not have a good experience last time he visited [facility] due to communication issues, therefore refusing to go  CM reminded Client this is his second missed dental appointment and how this can negatively impact his dental health  Client verbalized understanding but refused to attend  CM encouraged Client to contact [facility] for counseling  Client declined  No SI/HI reported  No other needs discussed/reported

## 2023-05-17 ENCOUNTER — PATIENT OUTREACH (OUTPATIENT)
Dept: SURGERY | Facility: CLINIC | Age: 43
End: 2023-05-17

## 2023-05-18 ENCOUNTER — PATIENT OUTREACH (OUTPATIENT)
Dept: SURGERY | Facility: CLINIC | Age: 43
End: 2023-05-18

## 2023-05-18 NOTE — PROGRESS NOTES
CM called [facility] [employer] [facility] to follow up with referral status but there was no response  CM left message asking for call back

## 2023-05-19 ENCOUNTER — PATIENT OUTREACH (OUTPATIENT)
Dept: SURGERY | Facility: CLINIC | Age: 43
End: 2023-05-19

## 2023-05-22 ENCOUNTER — PATIENT OUTREACH (OUTPATIENT)
Dept: SURGERY | Facility: CLINIC | Age: 43
End: 2023-05-22
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
## 2023-05-30 ENCOUNTER — PATIENT OUTREACH (OUTPATIENT)
Dept: SURGERY | Facility: CLINIC | Age: 43
End: 2023-05-30

## 2023-05-31 NOTE — PROGRESS NOTES
CM called Client to follow up with Social Security referral     Client reports has not gone to [facility] office, admitted lack of motivation is a barrier, also feeling persistent sadness due to lack of proper housing  CM encouraged Client to talk to S for engage in mental health treatment but declined  CM encouraged Client to visit [facility] office and complete SSI application  Client agreed to do so later today and report outcome

## 2023-06-05 ENCOUNTER — PATIENT OUTREACH (OUTPATIENT)
Dept: SURGERY | Facility: CLINIC | Age: 43
End: 2023-06-05

## 2023-06-05 NOTE — PROGRESS NOTES
CM received voicemail from Client's mother requesting a call back  CM called Client's mother several times with no response  Voice message left asking for call back

## 2023-06-06 ENCOUNTER — PATIENT OUTREACH (OUTPATIENT)
Dept: SURGERY | Facility: CLINIC | Age: 43
End: 2023-06-06

## 2023-06-07 ENCOUNTER — PATIENT OUTREACH (OUTPATIENT)
Dept: SURGERY | Facility: CLINIC | Age: 43
End: 2023-06-07

## 2023-06-07 ENCOUNTER — TELEPHONE (OUTPATIENT)
Dept: PSYCHIATRY | Facility: CLINIC | Age: 43
End: 2023-06-07

## 2023-06-07 NOTE — TELEPHONE ENCOUNTER
Patient has been added to Epic wait list for MED[facility] MANAGEMENT [facility];  Interpretive Services Required)
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
## 2023-06-14 ENCOUNTER — PATIENT OUTREACH (OUTPATIENT)
Dept: SURGERY | Facility: CLINIC | Age: 43
End: 2023-06-14

## 2023-06-15 NOTE — PROGRESS NOTES
CM called Client to follow up with SSA and Psych referral     Client reports doing better, was able to talk to his mom and step-father and make amends  Client spoke to stepfather about need to visit [address] office this Friday and apply for SSI  Client agreed to keep CM up to date  No contact received from Psych department re: appointment  Client still in wait list      Client continues to receive SNAP benefits  No other needs reported

## 2023-06-15 NOTE — PROGRESS NOTES
CM called Client, discussed  referral     Client reports has not received any call from  provider  Client expressed concern over financial situation and would like to have SSI case discussed with  and work on resolution  CM agreed to follow up with   CM called [facility] to follow up with referral status but there was no response      CM left message asking for call back

## 2023-06-19 ENCOUNTER — PATIENT OUTREACH (OUTPATIENT)
Dept: SURGERY | Facility: CLINIC | Age: 43
End: 2023-06-19

## 2023-06-20 ENCOUNTER — PATIENT OUTREACH (OUTPATIENT)
Dept: SURGERY | Facility: CLINIC | Age: 43
End: 2023-06-20

## 2023-06-20 NOTE — PROGRESS NOTES
CM met with Client  Client reports received letters from [facility] and provided CM with copies for review  CM informed Client that DHS closed his Medicaid and SNAP case due to excesive income  CM explained to Client that this is a reoccurring issue and will need assistance from  to resolve issue  CM assisted Client complete DHS     CM assisted Client complete online referral for [employer]  Confirmation page obtained (see attachment)  Client also provided letter received from [facility] office  CM review letter and informed Client that O[facility] received referral and assigned new counselor to him: Jaquelin Buys   CM called [facility] counselor and left message asking for call back  Client has not visited the [facility] office to apply for SSI and reports communication issues  Client asked for assistance with escort to [facility] office for assistance applying for SSI  CM agreed to discuss need with  and report outcome  Client reports he applied for SPBP back in 2021 but never received the card  CM called SPBP but there was no response  CM will try again later  CM assisted Client log into [employer] online portal and complete/submit Medicaid/Cash/SNAP application  CM also assisted submitting copy of supportive documents to [facility] portal: State ID, SS Card, Proof of medical diagnosis  RW annual re-certification forms completed  RW Acuity Scale completed along with BISI's  Sliding Fee Scale completed

## 2023-06-21 NOTE — PROGRESS NOTES
CM called [facility] along with Client for referral     CM and Client spoke to [employer] ad discussed client's financial, housing and medical situation   Intake completed  Client told will receive an email in three business days from Liu Burnham detailing  assigned to his case and appointment date  CM and Client called [address] [facility] (Ann-Marie Alt: [phone]) and reported identity theft issue  Worker agreed to pass information to  Damion Cast) who will investigate issue and report outcome to this CM/Client  CM also called [facility] () to follow up with Psych referral  CM spoke to worker who provided [place] ()  CM called [employer] and was told Client is currently in a wait list and will be notified once an appt becomes available  CM provided Client with update

## 2023-06-22 ENCOUNTER — PATIENT OUTREACH (OUTPATIENT)
Dept: SURGERY | Facility: CLINIC | Age: 43
End: 2023-06-22

## 2023-06-23 ENCOUNTER — PATIENT OUTREACH (OUTPATIENT)
Dept: SURGERY | Facility: CLINIC | Age: 43
End: 2023-06-23

## 2023-06-23 NOTE — PROGRESS NOTES
CM called Client, discuss need for SSI application appointment  CM attempted to schedule appointment via [employer] webiste but unable to due to technical issue with website  CM assisted Client by contacting [facility] office ()  CM and Client spoke to [facility] representative, discussed Client's medical, financial and housing status  SSI phone appointment interview scheduled for July 13 at 11:30 AM     CM agree to meet with Client on 7/13/23 at 11 am to assist complete SSI application  Moments later, Client called CM to report received call from [employer] worker who inquired about SSI legal needs but Client was unable to articulate need  Client asked worker to call CM for information on SSI needs  Worker agreed to call CM on Monday  CM agreed to update Client if such call is received

## 2023-06-23 NOTE — PROGRESS NOTES
Client called CM  Client reports step dad unable to accompany him to [place] [employer] office to apply for SSI  Client asked CM for assistance contacting [place] [employer] office and schedule appointment      CM attempted to schedule SSI appointment via [place] webiste but unable to due to technical issue      CM assisted Client by contacting [facility] office () but unable to get a response  CM agreed to reach out to Client tomorrow and attempt to coordinate SSI appointment again  Client in agreement

## 2023-06-26 ENCOUNTER — PATIENT OUTREACH (OUTPATIENT)
Dept: SURGERY | Facility: CLINIC | Age: 43
End: 2023-06-26

## 2023-06-26 DIAGNOSIS — K06.1 GUM HYPERPLASIA: ICD-10-CM

## 2023-06-26 DIAGNOSIS — B20 AIDS (ACQUIRED IMMUNE DEFICIENCY SYNDROME) (HCC): ICD-10-CM

## 2023-06-26 DIAGNOSIS — Z29.8 NEED FOR PNEUMOCYSTIS PROPHYLAXIS: ICD-10-CM

## 2023-06-26 DIAGNOSIS — B20 SYMPTOMATIC HIV INFECTION (HCC): ICD-10-CM

## 2023-06-26 DIAGNOSIS — K08.9 POOR DENTITION: ICD-10-CM

## 2023-06-26 RX ORDER — CHLORHEXIDINE GLUCONATE 0.12 MG/ML
RINSE ORAL
Qty: 118 ML | Refills: 2 | Status: SHIPPED | OUTPATIENT
Start: 2023-06-26

## 2023-06-26 RX ORDER — SULFAMETHOXAZOLE AND TRIMETHOPRIM 800; 160 MG/1; MG/1
TABLET ORAL
Qty: 30 TABLET | Refills: 5 | Status: SHIPPED | OUTPATIENT
Start: 2023-06-26 | End: 2023-09-24

## 2023-06-26 RX ORDER — EMTRICITABINE AND TENOFOVIR ALAFENAMIDE 200; 25 MG/1; MG/1
TABLET ORAL
Qty: 30 TABLET | Refills: 2 | Status: SHIPPED | OUTPATIENT
Start: 2023-06-26

## 2023-06-26 RX ORDER — DOLUTEGRAVIR SODIUM 50 MG/1
TABLET, FILM COATED ORAL
Qty: 30 TABLET | Refills: 2 | Status: SHIPPED | OUTPATIENT
Start: 2023-06-26

## 2023-06-26 NOTE — PROGRESS NOTES
CM called Client  Client reports doing better, was able to talk to his mom and make amends  Client will later talk to his step dad once he returns from work  Client reports receive call from psych clinic informing him that he has been placed in a wait list for medication management  CM reminded Client and reinforce importance of mental health  Client verbalized understanding, willing to meet with psych provider once appointment becomes available  No other needs reported

## 2023-06-26 NOTE — PROGRESS NOTES
Client called CM  Client reports received call from  Stacia Worthy from [address] to complete interview, however, client was unable to due to language barrier  Client asked CM for assistance contacting  at  to complete interview  CM called  several times with there was no response and message could not be left  Client reports has a girlfriend and inquired about HIV transmission mode and ways to protect himself  CM provided Client with HIV risk reduction education, safe sex and condom use  Client agreed to stop by clinic and  condoms available for free  CM made other attempts to contact  but no response

## 2023-06-28 ENCOUNTER — PATIENT OUTREACH (OUTPATIENT)
Dept: SURGERY | Facility: CLINIC | Age: 43
End: 2023-06-28

## 2023-06-29 ENCOUNTER — TELEPHONE (OUTPATIENT)
Dept: SURGERY | Facility: CLINIC | Age: 43
End: 2023-06-29

## 2023-06-29 ENCOUNTER — DOCUMENTATION (OUTPATIENT)
Dept: SURGERY | Facility: CLINIC | Age: 43
End: 2023-06-29

## 2023-06-29 NOTE — PROGRESS NOTES
Montrell De La Vega presented to clinic with food insecurities  Pt will not be receiving SNAP until 7/3 and says that he has very limited food at home presently  Pt accepted emergency food donation bag, and was encouraged to f/u with [address] pantry and was provided with their contact info  RD offered to walk with pt to pantry, pt declined at this time but will consider in the future  He was also provided with rolling food cart

## 2023-06-29 NOTE — TELEPHONE ENCOUNTER
Patient called asking for a food box due to not receiving his food stamps until 7/3  I told him I will reach out to the Nutritionist and get back to him

## 2023-06-30 ENCOUNTER — PATIENT OUTREACH (OUTPATIENT)
Dept: SURGERY | Facility: CLINIC | Age: 43
End: 2023-06-30
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
## 2023-07-05 ENCOUNTER — PATIENT OUTREACH (OUTPATIENT)
Dept: SURGERY | Facility: CLINIC | Age: 43
End: 2023-07-05

## 2023-07-05 ENCOUNTER — TELEPHONE (OUTPATIENT)
Dept: SURGERY | Facility: CLINIC | Age: 43
End: 2023-07-05

## 2023-07-05 NOTE — TELEPHONE ENCOUNTER
Called multiple CCN's numbers and I was unable to get through. Will attempt to call again. Patient only has 4 days worth of meds and wants to make sure he gets his refills on time.
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
## 2023-07-07 ENCOUNTER — PATIENT OUTREACH (OUTPATIENT)
Dept: SURGERY | Facility: CLINIC | Age: 43
End: 2023-07-07

## 2023-07-10 ENCOUNTER — PATIENT OUTREACH (OUTPATIENT)
Dept: SURGERY | Facility: CLINIC | Age: 43
End: 2023-07-10

## 2023-07-10 DIAGNOSIS — B20 SYMPTOMATIC HIV INFECTION (HCC): ICD-10-CM

## 2023-07-10 DIAGNOSIS — B20 AIDS (ACQUIRED IMMUNE DEFICIENCY SYNDROME) (HCC): ICD-10-CM

## 2023-07-10 RX ORDER — EMTRICITABINE AND TENOFOVIR ALAFENAMIDE 200; 25 MG/1; MG/1
1 TABLET ORAL EVERY MORNING
Qty: 90 TABLET | Refills: 0 | Status: SHIPPED | OUTPATIENT
Start: 2023-07-10

## 2023-07-10 RX ORDER — DOLUTEGRAVIR SODIUM 50 MG/1
50 TABLET, FILM COATED ORAL EVERY MORNING
Qty: 90 TABLET | Refills: 0 | Status: SHIPPED | OUTPATIENT
Start: 2023-07-10

## 2023-07-10 NOTE — PROGRESS NOTES
Pt ran out of Descovy and Tivicay and SPBP renewal paperwork was submitted today. Gave pt 14 days supply of Descovy and Tivicay from stock from [facility] in medication packaging with instructions.   Reorder sent to [facility] pending SPBP approval.

## 2023-07-11 ENCOUNTER — TELEPHONE (OUTPATIENT)
Dept: SURGERY | Facility: CLINIC | Age: 43
End: 2023-07-11

## 2023-07-11 ENCOUNTER — PATIENT OUTREACH (OUTPATIENT)
Dept: SURGERY | Facility: CLINIC | Age: 43
End: 2023-07-11

## 2023-07-11 DIAGNOSIS — B20 HIV DISEASE (HCC): Primary | ICD-10-CM

## 2023-07-11 NOTE — PROGRESS NOTES
Client called CM. Client reports dental pain, discomfort and difficulty eating. Client asked CM for assistance in obtaining pain meds from PCP and coordination of dental emergency appointment. CM reminded Client of passed missing dental appointments and his lack of cooperation with dental clinic once he has been accommodated. Barries to dental care discussed: mental health, depression, anxiety and mild cognitive impairment. CM encouraged Client to discuss pain med need with clinic and to visit [facility] emergency department. Client in agreement. CM transferred Client to clinic. Moments later, CM case conference with PCP re: Client's dental needs, non-adherence to dental appts, barriers to care and referral to [facility] emergency dept for dental care.

## 2023-07-11 NOTE — TELEPHONE ENCOUNTER
LVM for patient to call me back. Wanted to remind him to get his labs done for his upcoming ID appointment on 7/19.

## 2023-07-12 ENCOUNTER — PATIENT OUTREACH (OUTPATIENT)
Dept: SURGERY | Facility: CLINIC | Age: 43
End: 2023-07-12

## 2023-07-12 PROBLEM — G89.29 CHRONIC DENTAL PAIN: Status: ACTIVE | Noted: 2023-07-12

## 2023-07-12 PROBLEM — K08.9 CHRONIC DENTAL PAIN: Status: ACTIVE | Noted: 2023-07-12

## 2023-07-13 ENCOUNTER — PATIENT OUTREACH (OUTPATIENT)
Dept: SURGERY | Facility: CLINIC | Age: 43
End: 2023-07-13

## 2023-07-14 ENCOUNTER — TELEPHONE (OUTPATIENT)
Dept: SURGERY | Facility: CLINIC | Age: 43
End: 2023-07-14

## 2023-07-17 ENCOUNTER — OFFICE VISIT (OUTPATIENT)
Dept: SURGERY | Facility: CLINIC | Age: 43
End: 2023-07-17
Payer: COMMERCIAL

## 2023-07-17 ENCOUNTER — PATIENT OUTREACH (OUTPATIENT)
Dept: SURGERY | Facility: CLINIC | Age: 43
End: 2023-07-17

## 2023-07-17 ENCOUNTER — DOCUMENTATION (OUTPATIENT)
Dept: SURGERY | Facility: CLINIC | Age: 43
End: 2023-07-17

## 2023-07-17 VITALS
TEMPERATURE: 98.6 F | SYSTOLIC BLOOD PRESSURE: 119 MMHG | OXYGEN SATURATION: 97 % | HEIGHT: 75 IN | WEIGHT: 181.6 LBS | HEART RATE: 68 BPM | RESPIRATION RATE: 18 BRPM | BODY MASS INDEX: 22.58 KG/M2 | DIASTOLIC BLOOD PRESSURE: 65 MMHG

## 2023-07-17 DIAGNOSIS — R68.84 PAIN IN BONE OF JAW: ICD-10-CM

## 2023-07-17 DIAGNOSIS — R51.9 RIGHT SIDED FACIAL PAIN: ICD-10-CM

## 2023-07-17 DIAGNOSIS — K08.9 CHRONIC DENTAL PAIN: ICD-10-CM

## 2023-07-17 DIAGNOSIS — K04.7 DENTAL INFECTION: ICD-10-CM

## 2023-07-17 DIAGNOSIS — E55.9 VITAMIN D DEFICIENCY: ICD-10-CM

## 2023-07-17 DIAGNOSIS — Z29.8 NEED FOR PNEUMOCYSTIS PROPHYLAXIS: ICD-10-CM

## 2023-07-17 DIAGNOSIS — G89.29 CHRONIC DENTAL PAIN: ICD-10-CM

## 2023-07-17 DIAGNOSIS — R22.0 SWELLING OF MANDIBLE: ICD-10-CM

## 2023-07-17 DIAGNOSIS — R63.4 WEIGHT LOSS: ICD-10-CM

## 2023-07-17 DIAGNOSIS — B20 CURRENTLY ASYMPTOMATIC HIV INFECTION, WITH HISTORY OF HIV-RELATED ILLNESS (HCC): Primary | ICD-10-CM

## 2023-07-17 DIAGNOSIS — F12.10 MARIJUANA ABUSE: ICD-10-CM

## 2023-07-17 DIAGNOSIS — K08.9 POOR DENTITION: ICD-10-CM

## 2023-07-17 DIAGNOSIS — T65.222D TOXIC EFFECT OF TOBACCO CIGARETTE, INTENTIONAL SELF-HARM, SUBSEQUENT ENCOUNTER: ICD-10-CM

## 2023-07-17 DIAGNOSIS — R22.0 JAW SWELLING: ICD-10-CM

## 2023-07-17 PROCEDURE — 99214 OFFICE O/P EST MOD 30 MIN: CPT | Performed by: NURSE PRACTITIONER

## 2023-07-17 RX ORDER — AMOXICILLIN AND CLAVULANATE POTASSIUM 875; 125 MG/1; MG/1
1 TABLET, FILM COATED ORAL EVERY 12 HOURS SCHEDULED
Qty: 20 TABLET | Refills: 0 | Status: SHIPPED | OUTPATIENT
Start: 2023-07-17 | End: 2023-07-27

## 2023-07-17 NOTE — PROGRESS NOTES
CM met with Client. CM assisted Client with interpretation and complete Social Security Disability Phone Interview. [facility] worker informed Client that he will received a notice by mail with a medical appointment with one of [facility] medical providers for a physical evaluation. Client encouraged to contact [facility] office and update his cell phone and address if it changes. CM also assisted Client complete [facility] form 82810 (Identity Theft Affidavit) and faxed copy to [facility] office along with supportive documents.

## 2023-07-17 NOTE — ASSESSMENT & PLAN NOTE
Still smokes 1/2 ppd or more. Did not tolerate nicotine gum due to rash. Did not tolerate Wellbutrin due to side effects (wanting to hurt himself). Not a candidate for Chantix due to [address] [place] [address]. · Follow with Star Wang from [facility] for smoking cessation    Nicotine Dependency - Primary    Counseled for greater than 15 minutes on the importance of smoking cessation. Education was given regarding the cardiovascular effects of how nicotine affects the heart, lungs, kidneys, and peripheral vascular system.   Referred to [address] [place] [address],[address] [facility] for enrollment in smoking cessation program.

## 2023-07-17 NOTE — PROGRESS NOTES
Assessment/Plan:    Currently asymptomatic HIV infection, with history of HIV-related illness ([address])  Doing well on Tivicay and Descovy with an undetectable VL. Pt reports missing several doses due to running of ART. · Stressed the importance of 100% medication adherence  · Reviewed labs     HIV Counseling:    Viral Load: < 20    CD4 Count: 108      Denies side effects. Stressed the importance of adherence. Continue follow up in the ID clinic with Dr. Cele Gibson or Dr. Kaleb Schneider. Reviewed the most recent labs, including the CD4 and viral load. Discussed the risks and benefits of treatment options, instructions for management, importance of treatment adherence, and reduction of risk factors. Educated on possible medication side effects. Counseled on routes of HIV transmission, including the risk of  infection. Emphasized that viral suppression is the best method to prevent HIV transmission. At this time the pt denies the need for HIV testing of anyone in their life. Total encounter time was greater than 35 minutes. Greater than 20 minutes were spent on counseling and patient education. Pt voices understanding and agreement with treatment plan. Chronic dental pain  Persistent dental pain. · Discussed the importance of pt keeping dental visits and must stay  · Discussed plan with CM  ·     Marijuana abuse  Still smoking daily for anxiety and depression (2blunts). Smokes almost everyday. · Recommend smoking cessation    Toxic effect of tobacco cigarette  Still smokes 1/2 ppd or more. Did not tolerate nicotine gum due to rash. Did not tolerate Wellbutrin due to side effects (wanting to hurt himself). Not a candidate for Chantix due to [address] [place] [address]. · Follow with Nilda [facility] from [facility] for [facility] cessation    Nicotine Dependency - Primary    Counseled for greater than 15 minutes on the importance of smoking cessation.   Education was given regarding the cardiovascular effects of how nicotine affects the heart, lungs, kidneys, and peripheral vascular system. Referred to [facility] for enrollment in smoking cessation program.      Need for pneumocystis prophylaxis  · Continue to take Bactrim  · Continue monitor CD4    Weight loss  Recently loss 8 lb due to food insecurities. Does use food casillas, food stamps. BMI: 22.7; WT: 181 lb. · Continue to monitor   · Follow with [facility] dietician    Poor dentition  Missing several teeth, teeth are worn down, and several dental cavities,  · Ct of facial bone with contrast to rule out abscess or osteomyelitis  · Augmentin 1 tablet q 12 hours X 10 days  · Continue Chlorhexidine mouth wash swish and swallow BID   · Discussed when to go to ER  · May use Tylenol for pain   · Follow up with Dental on wait list  · May need OMS after ct scan    Swelling of mandible  Missing several teeth, teeth are worn down, and several dental cavities. Swelling of right lower jaw and tender to palpation. Visible swelling of right side of face and erythema has resolved from outside. Concerning for dental abscess, dental infection, or osteomyelitis. · Ct of facial bone with contrast to rule out abscess or osteomyelitis  · Augmentin 1 tablet q 12 hours X 10 days  · Continue Chlorhexidine mouth wash swish and swallow BID   · Discussed when to go to ER  · May use Tylenol for pain   · Follow up with Dental on wait list  · May need OMS after ct scan  · Recommend smoking cessation  · Coordinating care with DEVIN Moore to see about accompanying pt to dental appt so he stays       Diagnoses and all orders for this visit:    Currently asymptomatic HIV infection, with history of HIV-related illness ([address])    Chronic dental pain    Vitamin D deficiency  -     Cholecalciferol 125 MCG (5000 UT) capsule;  Take 1 capsule (5,000 Units total) by mouth daily    Marijuana abuse    Toxic effect of tobacco cigarette, intentional self-harm, subsequent encounter    Need for pneumocystis prophylaxis    Weight loss    Pain in bone of jaw  -     CT facial bones w contrast; Future  -     amoxicillin-clavulanate (AUGMENTIN) 875-125 mg per tablet; Take 1 tablet by mouth every 12 (twelve) hours for 10 days    Right sided facial pain  -     CT facial bones w contrast; Future  -     amoxicillin-clavulanate (AUGMENTIN) 875-125 mg per tablet; Take 1 tablet by mouth every 12 (twelve) hours for 10 days    Poor dentition  -     CT facial bones w contrast; Future    Jaw swelling  -     CT facial bones w contrast; Future  -     amoxicillin-clavulanate (AUGMENTIN) 875-125 mg per tablet; Take 1 tablet by mouth every 12 (twelve) hours for 10 days    Dental infection  -     amoxicillin-clavulanate (AUGMENTIN) 875-125 mg per tablet; Take 1 tablet by mouth every 12 (twelve) hours for 10 days    Swelling of mandible        Lab Results   Component Value Date    K 4.5 04/13/2023     04/13/2023    CO2 29 04/13/2023    BUN 15 04/13/2023    CREATININE 1.16 04/13/2023    GLUF 94 04/13/2023    CALCIUM 9.7 04/13/2023    AST 22 04/13/2023    ALT 22 04/13/2023    ALKPHOS 79 04/13/2023    EGFR 77 04/13/2023     Lab Results   Component Value Date    WBC 4.78 04/13/2023    WBC 4.8 04/13/2023    HGB 14.4 04/13/2023    HGB 14.5 04/13/2023    HCT 43.2 04/13/2023    HCT 43.0 04/13/2023    MCV 99 (H) 04/13/2023    MCV 97 04/13/2023     04/13/2023     04/13/2023         Subjective:      Patient ID: Fortino Fu is a 44 y.o. male. HPI  Pt is being seen for 3 month follow up visit and for management of HIV. Pt had runs out of Descovy and Tivicay and did miss a couple of doses. Pt's SPBP also needed to be renewed and that process is underway. Pt reports worsening dental pain. Pt has had previous appointments with [facility] but pt does not want to wait. Pt reports right back molar broke and his jaw has been swollen and red.   Right jaw swelling is better from the outside of pt's face but pain does continue and has difficulty chewing on that side and swelling on inside of mouth. Pt went to pharmacy to buy and OTC medication. Does not know the name but thinks it is a abx that he took a couple of times. Pt reports he feels soft and tired. Pt is playing basketball. Pt does not always have food to eat and does use the [employer] food pantry. Pt does not endorse any fever, chills, nausea, vomiting, cough, SOB, diarrhea, or night sweats. The following portions of the patient's history were reviewed and updated as appropriate: allergies, current medications, past family history, past medical history, past social history, past surgical history and problem list.    Review of Systems   Constitutional: Positive for fatigue. HENT: Positive for dental problem and facial swelling. Respiratory: Negative. Cardiovascular: Negative. Gastrointestinal: Negative. Musculoskeletal: Positive for arthralgias. Psychiatric/Behavioral: Negative. Objective:      /65 (BP Location: Right arm, Patient Position: Sitting, Cuff Size: Large)   Pulse 68   Temp 98.6 °F (37 °C) (Tympanic)   Resp 18   Ht 6' 3" (1.905 m)   Wt 82.4 kg (181 lb 9.6 oz)   SpO2 97%   BMI 22.70 kg/m²          Physical Exam  Vitals and nursing note reviewed. Constitutional:       General: He is not in acute distress. Appearance: Normal appearance. He is not ill-appearing. HENT:      Head:      Jaw: Tenderness, swelling and pain on movement present. Mouth/Throat:      Mouth: Mucous membranes are dry. Dentition: Abnormal dentition. Dental tenderness, gingival swelling, dental caries and dental abscesses present. Cardiovascular:      Rate and Rhythm: Normal rate and regular rhythm. Chest Wall: PMI is not displaced. Pulses: Normal pulses. Heart sounds: Normal heart sounds. Pulmonary:      Effort: Pulmonary effort is normal.      Breath sounds: Normal breath sounds.    Abdominal:      General: Bowel sounds are normal. Palpations: Abdomen is soft. Musculoskeletal:         General: Swelling present. Skin:     Capillary Refill: Capillary refill takes less than 2 seconds. Neurological:      Mental Status: He is alert and oriented to person, place, and time. Psychiatric:         Mood and Affect: Mood normal.         Behavior: Behavior normal.         Thought Content:  Thought content normal.         Judgment: Judgment normal.

## 2023-07-17 NOTE — ASSESSMENT & PLAN NOTE
Persistent dental pain.     · Discussed the importance of pt keeping dental visits and must stay  · Discussed plan with CM  ·

## 2023-07-17 NOTE — ASSESSMENT & PLAN NOTE
Missing several teeth, teeth are worn down, and several dental cavities,  · Ct of facial bone with contrast to rule out abscess or osteomyelitis  · Augmentin 1 tablet q 12 hours X 10 days  · Continue Chlorhexidine mouth wash swish and swallow BID   · Discussed when to go to ER  · May use Tylenol for pain   · Follow up with Dental on wait list  · May need OMS after ct scan

## 2023-07-17 NOTE — ASSESSMENT & PLAN NOTE
Recently loss 8 lb due to food insecurities. Does use food casillas, food stamps. BMI: 22.7; WT: 181 lb.   · Continue to monitor   · Follow with [address]

## 2023-07-17 NOTE — PROGRESS NOTES
[facility] Nutrition Encounter       Ovidio Villafuerte is a 44 y.o. male  seen by RD in conjunction with PCP f/u    Last nutrition encounter was 4/19/2023 re: Nutrition Diagnosis Problem: unintended weight loss Related to: depression  and stressful life event or living situation As Evidenced By: economic constraints that limit food availability , patient interview  and weight loss     PMHx: mental impairment, poor dentition, PTSD, depression, anxiety       Monitoring/Evaluation:  1.  BW   Wt Readings from Last 3 Encounters:   07/17/23 82.4 kg (181 lb 9.6 oz)   04/19/23 83.2 kg (183 lb 6.4 oz)   03/22/23 86 kg (189 lb 9.6 oz)   -Wt relatively stabilized since last nutrition encounter 3 months ago     2. Dietary patterns   -Not detailed   Food Insecurity: Food Insecurity Present (7/17/2023)    Hunger Vital Sign    • Worried About Running Out of Food in the Last Year: Often true    • Ran Out of Food in the Last Year: Often true         Nutrition Intervention/Recommendations    Nutrition education intervention: reinforced previous education     Nutrition recommendations: work with Eliane Mckeon for [employer] application, visit [employer] choice food pantry down the block, utilize [facility] mobile market voucher     Supplement recommendations: No supplement recommendations at this time      Teaching Method: verbal     Person Educated: patient      Collaboration/referral of nutrition care:    (ongoing support) Mobile Market voucher       Goals:  1. Prevent weight loss   Not met, continue to work on this goal    -SNAP application with    -[employer] choice food pantry; pt reports that he will visit today   -Use [employer] for [employer]      Monitoring/Evaluation:  1.  BW   2. Dietary patterns/food access       Visit Summary  RD was notified by Sara Condon MA that pt screened positive for food insecurity.     Upon further exploration, pt reports that his SNAP benefits did not come in July, he says that he has discussed this with his [facility] CM, and it is being work on. Pt will plan to go to [address] [facility] for assistance as he has used their services in the past.    Florentin Alfaro was also provided with [employer] and printed flyer of locations, pt says that his mom will be able to help him with the addresses as pt cannot read. Continue to monitor and support pt food access, offer nutritional intervention as requested and warranted.       Yvonne Cummins, MS, RD, LDN

## 2023-07-17 NOTE — ASSESSMENT & PLAN NOTE
Still smoking daily for anxiety and depression (2blunts). Smokes almost everyday.   · Recommend smoking cessation

## 2023-07-17 NOTE — ASSESSMENT & PLAN NOTE
Doing well on Tivicay and Descovy with an undetectable VL. Pt reports missing several doses due to running of ART. · Stressed the importance of 100% medication adherence  · Reviewed labs     HIV Counseling:    Viral Load: < 20    CD4 Count: 108      Denies side effects. Stressed the importance of adherence. Continue follow up in the ID clinic with Dr. Nils Wilkinson or Dr. Almaz Morales. Reviewed the most recent labs, including the CD4 and viral load. Discussed the risks and benefits of treatment options, instructions for management, importance of treatment adherence, and reduction of risk factors. Educated on possible medication side effects. Counseled on routes of HIV transmission, including the risk of  infection. Emphasized that viral suppression is the best method to prevent HIV transmission. At this time the pt denies the need for HIV testing of anyone in their life. Total encounter time was greater than 35 minutes. Greater than 20 minutes were spent on counseling and patient education. Pt voices understanding and agreement with treatment plan.

## 2023-07-17 NOTE — ASSESSMENT & PLAN NOTE
Missing several teeth, teeth are worn down, and several dental cavities. Swelling of right lower jaw and tender to palpation. Visible swelling of right side of face and erythema has resolved from outside. Concerning for dental abscess, dental infection, or osteomyelitis.    · Ct of facial bone with contrast to rule out abscess or osteomyelitis  · Augmentin 1 tablet q 12 hours X 10 days  · Continue Chlorhexidine mouth wash swish and swallow BID   · Discussed when to go to ER  · May use Tylenol for pain   · Follow up with Dental on wait list  · May need OMS after ct scan  · Recommend smoking cessation  · Coordinating care with DEVIN Luke to see about accompanying pt to dental appt so he stays

## 2023-07-17 NOTE — PROGRESS NOTES
CM case conference with PCP re: Client's dental needs, referral for facial CT scan and antibiotic prescription meds. CM met with Client. Client met with PCP today. Client will be assisted with CT scan appointment by clinic. Client encouraged to visit [facility] emergency dept if symptoms worsen. CM and Client case conference with Giancarlo Crump ([phone])  at [facility]. Client's recent SSDI application and reported identity theft discussed.  encouraged CM/Client to keep her updated with regards to outcome of SSDI application in case an appeal is needed. Client reported received email reminder from [address] [place] [address] him of steps needed to have his lost cell phone replaced. Client agreed to visit police dept tomorrow to file lost cell phone police reports and bring copy to this CM.

## 2023-07-17 NOTE — PROGRESS NOTES
Client called CM. Client reports called pharmacy to  his meds but was told his insurance is inactive. CM agreed to follow up with SPBP application. CM called SPBP. SPBP application approved effective 7/7/2023 to 8/31/2023. SPBP ID Number: [id number]. CM case conference with clinic reception & MA, provided update on SPBP application approval.  Clinic agreed to follow up with Client re: med needs. CM called Client and provided update. CM reminded Client of tomorrow's scheduled SSI Phone interview application at 60:05 AM.    CM and Client agreed to meet tomorrow to assist with phone interview.

## 2023-07-18 ENCOUNTER — PATIENT OUTREACH (OUTPATIENT)
Dept: SURGERY | Facility: CLINIC | Age: 43
End: 2023-07-18

## 2023-07-19 ENCOUNTER — PATIENT OUTREACH (OUTPATIENT)
Dept: SURGERY | Facility: CLINIC | Age: 43
End: 2023-07-19

## 2023-07-19 ENCOUNTER — APPOINTMENT (OUTPATIENT)
Dept: LAB | Facility: CLINIC | Age: 43
End: 2023-07-19
Payer: COMMERCIAL

## 2023-07-19 PROCEDURE — 87536 HIV-1 QUANT&REVRSE TRNSCRPJ: CPT

## 2023-07-19 NOTE — PROGRESS NOTES
Client's case discussed in huddle. Client called CM to report received letter from [facility] [place]. Client asked to meet with this CM to review letter.     CM and Client agree to meet tomorrow (7/19/23) at 10:30 AM.

## 2023-07-19 NOTE — PROGRESS NOTES
Client called CM to report received letter from [place] [facility] [place]. Client asked to meet with this CM. CM met with Client and revised letter. CM informed Client letter was an acknowledgement from [employer] [place] that they received his SSI application and asked to review the information contained in it and to report any inaccurate information for changes. CM and Client revised information and no errors found. Client reports will visit police dept with his step dad later today and file lost cell phone police report and bring copy to this CM.

## 2023-07-20 ENCOUNTER — PATIENT OUTREACH (OUTPATIENT)
Dept: SURGERY | Facility: CLINIC | Age: 43
End: 2023-07-20

## 2023-07-21 ENCOUNTER — PATIENT OUTREACH (OUTPATIENT)
Dept: SURGERY | Facility: CLINIC | Age: 43
End: 2023-07-21

## 2023-07-21 ENCOUNTER — HOSPITAL ENCOUNTER (OUTPATIENT)
Dept: RADIOLOGY | Facility: HOSPITAL | Age: 43
Discharge: HOME | End: 2023-07-21

## 2023-07-21 DIAGNOSIS — R22.0 JAW SWELLING: ICD-10-CM

## 2023-07-21 DIAGNOSIS — R51.9 RIGHT SIDED FACIAL PAIN: ICD-10-CM

## 2023-07-21 DIAGNOSIS — R68.84 PAIN IN BONE OF JAW: ICD-10-CM

## 2023-07-21 DIAGNOSIS — K08.9 POOR DENTITION: ICD-10-CM

## 2023-07-21 LAB — HIV1 RNA # PLAS NAA DL=20: NOT DETECTED {COPIES}/ML

## 2023-07-21 PROCEDURE — 70487 CT MAXILLOFACIAL W/DYE: CPT

## 2023-07-21 PROCEDURE — G1004 CDSM NDSC: HCPCS

## 2023-07-21 RX ADMIN — IOHEXOL 97 ML: 350 INJECTION, SOLUTION INTRAVENOUS at 12:31

## 2023-07-21 NOTE — PROGRESS NOTES
Client called CM. Client reports unable to read location of Radiology Clinic on referral sheet for CT scan. CM looked in Epic, informed Client he could visit Radiology Clinic located at [address] [facility]. CM encouraged Client to call once there for assistance communicating with staff for directions. Moments later, Client called CM from Radiology Clinic. CM spoke to reception who reported Client's insurance is inactive, Client will be billed if seen today. CM informed reception that Client applied for Medicaid and is waiting for approval. CM agreed to assist Client clearing medical bill with medicaid if received. Client in agreement. CM met with Client. Client reports adherence to CT Scan appointment. CM assisted Client complete SSA forms ([id number] & [id number]). CM case conference with clinic MA and provided update on Client's ST Scan.

## 2023-07-24 ENCOUNTER — PATIENT OUTREACH (OUTPATIENT)
Dept: SURGERY | Facility: CLINIC | Age: 43
End: 2023-07-24

## 2023-07-24 NOTE — PROGRESS NOTES
Client called CM to report received a packet from [place] [facility] office, asked to meet CM. CM met with Client. CM revised packet with Client. CM informed Client SSA needs him to complete forms: [id number] (Work History Report & Function Report). CM assisted Client partially complete forms. CM asked Client to provided information on employment history. Client reported could not recall employment history, agree to look for income tax information and report outcome. CM reminded Client of need to complete facial CT scan. Client agreed to adhere.

## 2023-07-25 ENCOUNTER — PATIENT OUTREACH (OUTPATIENT)
Dept: SURGERY | Facility: CLINIC | Age: 43
End: 2023-07-25

## 2023-07-26 ENCOUNTER — PATIENT OUTREACH (OUTPATIENT)
Dept: SURGERY | Facility: CLINIC | Age: 43
End: 2023-07-26

## 2023-07-26 ENCOUNTER — OFFICE VISIT (OUTPATIENT)
Dept: SURGERY | Facility: CLINIC | Age: 43
End: 2023-07-26
Payer: COMMERCIAL

## 2023-07-26 VITALS
HEART RATE: 62 BPM | SYSTOLIC BLOOD PRESSURE: 112 MMHG | WEIGHT: 181.6 LBS | DIASTOLIC BLOOD PRESSURE: 67 MMHG | OXYGEN SATURATION: 97 % | BODY MASS INDEX: 22.7 KG/M2 | TEMPERATURE: 97.5 F

## 2023-07-26 DIAGNOSIS — R73.03 PREDIABETES: ICD-10-CM

## 2023-07-26 DIAGNOSIS — A53.9 SYPHILIS: ICD-10-CM

## 2023-07-26 DIAGNOSIS — R41.89 COGNITIVE IMPAIRMENT: ICD-10-CM

## 2023-07-26 DIAGNOSIS — Z23 NEED FOR HPV VACCINE: ICD-10-CM

## 2023-07-26 DIAGNOSIS — B20 HIV DISEASE (HCC): Primary | ICD-10-CM

## 2023-07-26 DIAGNOSIS — K04.7 PERIAPICAL ABSCESS WITHOUT SINUS: ICD-10-CM

## 2023-07-26 DIAGNOSIS — E55.9 VITAMIN D DEFICIENCY: ICD-10-CM

## 2023-07-26 DIAGNOSIS — R93.89 ABNORMAL FINDING ON CT SCAN: Primary | ICD-10-CM

## 2023-07-26 DIAGNOSIS — R22.0 SWELLING OF MANDIBLE: ICD-10-CM

## 2023-07-26 DIAGNOSIS — D72.810 LYMPHOPENIA: ICD-10-CM

## 2023-07-26 DIAGNOSIS — K08.9 POOR DENTITION: ICD-10-CM

## 2023-07-26 DIAGNOSIS — M27.40 CYST OF JAW: ICD-10-CM

## 2023-07-26 DIAGNOSIS — K08.9 CHRONIC DENTAL PAIN: ICD-10-CM

## 2023-07-26 DIAGNOSIS — T65.222D TOXIC EFFECT OF TOBACCO CIGARETTE, INTENTIONAL SELF-HARM, SUBSEQUENT ENCOUNTER: ICD-10-CM

## 2023-07-26 DIAGNOSIS — F33.2 SEVERE EPISODE OF RECURRENT MAJOR DEPRESSIVE DISORDER, WITHOUT PSYCHOTIC FEATURES (HCC): ICD-10-CM

## 2023-07-26 DIAGNOSIS — K04.7 DENTAL ABSCESS: ICD-10-CM

## 2023-07-26 DIAGNOSIS — G89.29 CHRONIC DENTAL PAIN: ICD-10-CM

## 2023-07-26 PROCEDURE — 90651 9VHPV VACCINE 2/3 DOSE IM: CPT

## 2023-07-26 PROCEDURE — 90471 IMMUNIZATION ADMIN: CPT

## 2023-07-26 PROCEDURE — 99215 OFFICE O/P EST HI 40 MIN: CPT | Performed by: INTERNAL MEDICINE

## 2023-07-26 NOTE — PROGRESS NOTES
Client called CM. Client reports received call from [place] [facility] office and completed SSI application. Client reports found income tax information from last year and employment information. CM assisted Client complete and mailing SSA forms ([id number] & [id number]). Client also states received [facility] medical bill for CT scan. Client agree to drop off bill. Client stated not receiving food stamps. CM encouraged Client to visit [address] office, informed them that he submitted affidavit of identity theft and that he is in need of food stamps. Client agreed to visit [address] office with step dad and report outcome.

## 2023-07-26 NOTE — PROGRESS NOTES
Progress Note - Infectious Disease   Marshal Lloyd 44 y.o. male MRN: [id number]  Unit/Bed#:  Encounter: [id number]      Impression/Plan:  1. HIV. Has remained consistently undetectable on Tivicay and Descovy. CD4 count remains in the 100s. Continue ART, recheck labs in 2 months and follow-up in 3 months. Stressed adherence. We will discontinue the Bactrim as the patient has been consistently undetectable with a CD4 count between 100 and 200.     2.  Mental impairment. Patient with ongoing support services directed by her case management.     3.  Vitamin D deficiency. On vitamin D replacement     4. Prediabetes. Hemoglobin A1c at 5.7 which is modestly improved. We will continue to monitor and treat with diet     5. Syphilis. Status post treatment x3 dose of benzathine penicillin and 2021. Follow-up RPR minimally reactive. Will monitor with annual RPR     6. Nicotine dependence. Continue to stressed the importance of complete tobacco cessation     7. Depression. Severe. Behaviorist is arranging formal psychiatric evaluation, testing, and counseling    8. Dental abscess. Seen on CT scan. Continue Augmentin and referred to OMFS. Clinically improved. Patient was provided medication, adherence and prevention education    Subjective:  Routine follow-up for HIV. Patient claims 100% adherence with Tivicay and Descovy. Patient denies any notable side effects. Overall the feeling well. The patient denies any fever chills or sweats, denies any nausea vomiting or diarrhea, denies any cough or shortness of breath. ROS:  A complete review of systems is negative other than that noted above in the subjective    Followup portions patient history reviewed and updated as:   Allergies, current medications, past medical history, past social history, past surgical history, and the problem list    Objective:  Vitals:  Vitals:    07/26/23 1704   BP: 112/67   BP Location: Left arm   Patient Position: Sitting   Pulse: 62   Temp: 97.5 °F (36.4 °C)   TempSrc: Tympanic   SpO2: 97%   Weight: 82.4 kg (181 lb 9.6 oz)       Physical Exam:   General Appearance:  Alert, interactive, appearing well,  nontoxic, no acute distress. Neck:   Supple without lymphadenopathy, no thyromegaly or masses   Throat: Oropharynx moist without lesions. Lungs:   Clear to auscultation bilaterally; no wheezes, rhonchi or rales; respirations unlabored   Heart:  RRR; no murmur, rub or gallop   Abdomen:   Soft, non-tender, non-distended, positive bowel sounds. Extremities: No clubbing, cyanosis or edema   Skin: No new rashes or lesions. No draining wounds noted. Labs, Imaging, & Other studies:   All pertinent labs and imaging studies were personally reviewed    Lab Results   Component Value Date    K 4.0 07/19/2023     (H) 07/19/2023    CO2 28 07/19/2023    BUN 12 07/19/2023    CREATININE 1.22 07/19/2023    GLUF 118 (H) 07/19/2023    CALCIUM 9.3 07/19/2023    AST 27 07/19/2023    ALT 24 07/19/2023    ALKPHOS 87 07/19/2023    EGFR 72 07/19/2023     Lab Results   Component Value Date    WBC 4.82 07/19/2023    WBC 4.8 07/19/2023    HGB 14.0 07/19/2023    HGB 14.1 07/19/2023    HCT 40.4 07/19/2023    HCT 40.3 07/19/2023    MCV 97 07/19/2023    MCV 97 07/19/2023     07/19/2023     07/19/2023     Lab Results   Component Value Date    HEPCAB Non-reactive 01/11/2023     Lab Results   Component Value Date    HAV Reactive (A) 06/15/2021    HEPCAB Non-reactive 01/11/2023     Lab Results   Component Value Date    RPR Minimally Reactive (A) 01/11/2023     CD4 ABS   Date/Time Value Ref Range Status   07/19/2023 09:44  (L) 359 - 1519 /uL Final     HIV-1 RNA by PCR, Qn   Date/Time Value Ref Range Status   04/13/2023 09:15 AM <20 copies/mL Final     Comment:     HIV-1 RNA not detected  The reportable range for this assay is 20 to 10,000,000  copies HIV-1 RNA/mL.      HIV-1 TARGET   Date/Time Value Ref Range Status 07/19/2023 09:44 AM Not Detected Not Detected Final     CT facial bones.   Right first and second molar teeth with erosions and periapical abscesses    Images personally reviewed by me in PACS      Current Outpatient Medications:   •  acetaminophen (TYLENOL) 650 mg CR tablet, Take 1 tablet (650 mg total) by mouth every 8 (eight) hours as needed for mild pain, Disp: 30 tablet, Rfl: 0  •  albuterol (Ventolin HFA) 90 mcg/act inhaler, Inhale 2 puffs every 6 (six) hours as needed for wheezing, Disp: 18 g, Rfl: 2  •  amoxicillin-clavulanate (AUGMENTIN) 875-125 mg per tablet, Take 1 tablet by mouth every 12 (twelve) hours for 10 days, Disp: 20 tablet, Rfl: 0  •  chlorhexidine (PERIDEX) 0.12 % solution, SWISH, GARGLE, AND SPIT AS DIRECTED 15 MLS TO THE MOUTH OR THROAT TWICE A DAY, Disp: 118 mL, Rfl: 2  •  Cholecalciferol 125 MCG (5000 UT) capsule, Take 1 capsule (5,000 Units total) by mouth daily, Disp: 90 capsule, Rfl: 1  •  dolutegravir (Tivicay) 50 MG TABS, Take 1 tablet (50 mg total) by mouth every morning, Disp: 90 tablet, Rfl: 0  •  emtricitabine-tenofovir AF (Descovy) 200-25 MG tablet, Take 1 tablet by mouth every morning, Disp: 90 tablet, Rfl: 0  •  Multiple Vitamin (Tab-A-Maíra) TABS, TAKE 1 TABLET BY MOUTH IN THE MORNING, Disp: 90 tablet, Rfl: 1  •  sulfamethoxazole-trimethoprim (BACTRIM DS) 800-160 mg per tablet, TAKE 1 TABLET BY MOUTH IN THE MORNING, Disp: 30 tablet, Rfl: 5

## 2023-07-27 ENCOUNTER — PATIENT OUTREACH (OUTPATIENT)
Dept: SURGERY | Facility: CLINIC | Age: 43
End: 2023-07-27

## 2023-07-27 ENCOUNTER — TELEPHONE (OUTPATIENT)
Dept: SURGERY | Facility: CLINIC | Age: 43
End: 2023-07-27

## 2023-07-27 NOTE — TELEPHONE ENCOUNTER
Patient made aware of CT results and follow up scheduled with OMS that was scheduled by Diana Lazar. Patient verbalizes understanding.

## 2023-07-28 ENCOUNTER — PATIENT OUTREACH (OUTPATIENT)
Dept: SURGERY | Facility: CLINIC | Age: 43
End: 2023-07-28

## 2023-07-31 NOTE — PROGRESS NOTES
CM met with Client. Client reports unable to visit [facility] because his step-dad was unable to take him. CM checked Client's DHS case in [facility] online portal and confirmed his benefits were discontinued. CM assisted Client complete/sbumit new Medicaid/SNAP benefit application and uploading supportive documents. Client pleased with assistance, agree to keep CM up to date in case call is received from [facility].

## 2023-07-31 NOTE — PROGRESS NOTES
Client called CM to report received medical bill from [facility] and letter from [employer]. Client asked to meet with this CM to review letter. CM met with Client. CM reviewed assurance [employer] letter. CM called [employer] and assisted Client in submitting cell phone replacement application. Client will received new cell phone via mail within 7 business days. Client also provided DHS denial letter for medicaid/snap. CM assisted Client completed and submit via mail copy of hearing request.    Client please with assistance, agree to keep CM up to date.

## 2023-07-31 NOTE — PROGRESS NOTES
CM case conference with clinic receptionist Jermain Urbano) re: Client's recent PCP visit, medical order and referral to [facility].  reported was able to schedule dental appt for Client with [facility] for September 14, at 4:10 PM.     CM agreed to remind Client of appointment. CM called Client and provided update.

## 2023-08-02 ENCOUNTER — PATIENT OUTREACH (OUTPATIENT)
Dept: SURGERY | Facility: CLINIC | Age: 43
End: 2023-08-02

## 2023-08-03 ENCOUNTER — PATIENT OUTREACH (OUTPATIENT)
Dept: SURGERY | Facility: CLINIC | Age: 43
End: 2023-08-03

## 2023-08-03 NOTE — PROGRESS NOTES
Client called CM. Client reports received letters from social security administration and new cell phone from [address] [place] [address]. Client agreed to drop off letters. Client also reports spoke to [facility] worker and was told that his benefits will be reinstated by tomorrow. Moments later, CM received SSA letter. CM revised letters, called Client and informed him that Carlos notifies him that they are working on his disability claim and will be informed once a decision is made.    Client is also being scheduled for a phone interview for August 28th at 1:30 PM.     Another SSA letter encouraged Client to apply online for SS long-term and Disability benefits and apply for Extra Help with Medicare Prescription Drug Plan cost.

## 2023-08-04 ENCOUNTER — PATIENT OUTREACH (OUTPATIENT)
Dept: SURGERY | Facility: CLINIC | Age: 43
End: 2023-08-04

## 2023-08-08 ENCOUNTER — PATIENT OUTREACH (OUTPATIENT)
Dept: SURGERY | Facility: CLINIC | Age: 43
End: 2023-08-08

## 2023-08-08 NOTE — PROGRESS NOTES
CM called [employer] tech department in attempt to activate cell phone. After multiple unsuccessful attempts, [employer] agreed to mail a new ESPINO replacement card by mail, which should resolve the issue. CM called Client and provided update. Client reports received letter from [place] [facility] [place] and [facility], agree to bring it next week for review. CM and Client discussed and completed Epic due assessment and updated Goals.

## 2023-08-08 NOTE — PROGRESS NOTES
Client called CM, asked for assistance setting up new cell phone. CM met with Client. CM attempted to setting up new cell phone but unable to due to tech difficulties. CM called [facility] [employer] for tech assistance but there was no response. CM agreed to try again later. Epic goals discussed and updated.

## 2023-08-09 ENCOUNTER — PATIENT OUTREACH (OUTPATIENT)
Dept: SURGERY | Facility: CLINIC | Age: 43
End: 2023-08-09

## 2023-08-11 ENCOUNTER — PATIENT OUTREACH (OUTPATIENT)
Dept: SURGERY | Facility: CLINIC | Age: 43
End: 2023-08-11

## 2023-08-11 NOTE — PROGRESS NOTES
CM met Client. Client provided new pacheco card received via mail to activate cell phone replacement from [employer]. CM assisted Client activating cell phone by contacting [employer] ([phone]) and inserting pacheco card. Client pleased with assistance.

## 2023-08-11 NOTE — PROGRESS NOTES
CM met with Client, reviewed letters received from [facility] and [facility]. CM informed Client he has [facility] phone hearing scheduled for August 28, between the hours of 9 AM to 12 PM.   CM also assisted Client complete and mail Yellow Card acknowledgement receiving and agreeing to hearing. Client also received [facility] bill. Client asked for assistance in resolving bill issue. CM called [facility] billing department and informed them of bill received. Billing dept agreed to update Client's insurance information and revise file. CM encouraged to call later in the week. CM and Client agree to meet on August 28 to participate in [facility] and SSA hearing.

## 2023-08-15 ENCOUNTER — PATIENT OUTREACH (OUTPATIENT)
Dept: SURGERY | Facility: CLINIC | Age: 43
End: 2023-08-15
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
## 2023-08-16 ENCOUNTER — PATIENT OUTREACH (OUTPATIENT)
Dept: SURGERY | Facility: CLINIC | Age: 43
End: 2023-08-16

## 2023-08-17 ENCOUNTER — PATIENT OUTREACH (OUTPATIENT)
Dept: SURGERY | Facility: CLINIC | Age: 43
End: 2023-08-17

## 2023-08-17 NOTE — PROGRESS NOTES
Client called CM to report in need of food and clothing, asked for information/referral.    CM provided Client with information on the [address] and [employer]. CM also encouraged Client to contact clinic RD (Sanket Del Toro) for food assistance. CM transferred Client to clinic.
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
## 2023-08-17 NOTE — PROGRESS NOTES
CM met with Client. Client provided CM with copy of DHS letters received. CM revise letters, informed Client DHS requesting confirmation via witting of hearing scheduled for August 28, one for Client and one for this CM. CM assisted Client complete hearing confirmation page and mailing it. CM also completed hearing confirmation page and mailed it.

## 2023-08-18 NOTE — PROGRESS NOTES
CM called [employer] to follow up with referral.  CM spoke to  who took down Client's information and discussed identity theft issue and need to follow up with SSI case. Referral worker agreed to pass information to some of the  staff and to reach out to client. CM called Client and provided update.

## 2023-08-21 ENCOUNTER — PATIENT OUTREACH (OUTPATIENT)
Dept: SURGERY | Facility: CLINIC | Age: 43
End: 2023-08-21
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
## 2023-08-25 ENCOUNTER — PATIENT OUTREACH (OUTPATIENT)
Dept: SURGERY | Facility: CLINIC | Age: 43
End: 2023-08-25

## 2023-08-28 ENCOUNTER — PATIENT OUTREACH (OUTPATIENT)
Dept: SURGERY | Facility: CLINIC | Age: 43
End: 2023-08-28

## 2023-08-29 ENCOUNTER — PATIENT OUTREACH (OUTPATIENT)
Dept: SURGERY | Facility: CLINIC | Age: 43
End: 2023-08-29
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
## 2023-09-07 ENCOUNTER — PATIENT OUTREACH (OUTPATIENT)
Dept: SURGERY | Facility: CLINIC | Age: 43
End: 2023-09-07

## 2023-09-08 ENCOUNTER — PATIENT OUTREACH (OUTPATIENT)
Dept: SURGERY | Facility: CLINIC | Age: 43
End: 2023-09-08

## 2023-09-08 NOTE — PROGRESS NOTES
CM met with Client. Client provided letter received from [facility] [facility]. CM reviewed letter and informed Client he was being encouraged to apply for Disability. CM received letter from [address] of appeal hearing adjudication and resolution. Need to submit identity theft and police report discussed with Client. CM assisted Client in [place] form to Carol at [email or web address]. Response pending. CM looked online for the company's information tied to Client's identity theft. CM assisted Client complete Identity theft report. CM emailed copy of Bharath Desai form and Identity theft report to [address] office via fax. Fax confirmation page received. Client pleased with assistance.

## 2023-09-08 NOTE — PROGRESS NOTES
CM met with Client who reported received letter. CM reviewed letter and informed Client he received same letter as this CM yesterday re: August 28th [facility] Hearing on his Identity theft, case adjudication and Resolution. (see attachment). CM updated Client on fax sent yesterday to [facility] with copy of identity theft report, Emil Espinoza request.    Client reports not receiving any information by phone and in witting re: missed SSDI hearing last month. CM called [employer] office ([phone]). CM and Client case conference with [facility] worker re: SSI and SSDI application. SSA worker reported that Client's request for SSI benefit was approved. Client will receive a SSA letter in the mail requesting non-medical update information. [facility] worker also reported [facility] office tried calling Client on 8/28/2023 to discuss SSDI case but there was no response. New Phone interview appointment scheduled for October 5, 2023 at 9:30 AM.  This CM provided his contact office number to [facility] and added as secondary contact number for scheduled appointment in case Client cannot be reached. Client pleased with assistance and agreed to keep CM up to date.

## 2023-09-11 ENCOUNTER — PATIENT OUTREACH (OUTPATIENT)
Dept: SURGERY | Facility: CLINIC | Age: 43
End: 2023-09-11

## 2023-09-11 DIAGNOSIS — E55.9 VITAMIN D DEFICIENCY: ICD-10-CM

## 2023-09-11 DIAGNOSIS — B20 SYMPTOMATIC HIV INFECTION (HCC): ICD-10-CM

## 2023-09-11 DIAGNOSIS — B20 AIDS (ACQUIRED IMMUNE DEFICIENCY SYNDROME) (HCC): ICD-10-CM

## 2023-09-11 RX ORDER — EMTRICITABINE AND TENOFOVIR ALAFENAMIDE 200; 25 MG/1; MG/1
1 TABLET ORAL EVERY MORNING
Qty: 90 TABLET | Refills: 0 | Status: SHIPPED | OUTPATIENT
Start: 2023-09-11

## 2023-09-12 ENCOUNTER — PATIENT OUTREACH (OUTPATIENT)
Dept: SURGERY | Facility: CLINIC | Age: 43
End: 2023-09-12

## 2023-09-13 ENCOUNTER — PATIENT OUTREACH (OUTPATIENT)
Dept: SURGERY | Facility: CLINIC | Age: 43
End: 2023-09-13

## 2023-09-14 ENCOUNTER — PATIENT OUTREACH (OUTPATIENT)
Dept: SURGERY | Facility: CLINIC | Age: 43
End: 2023-09-14

## 2023-09-14 NOTE — PROGRESS NOTES
CM met with Client. Client provided CM with DHS letters received. CM informed Client DHS letter is a notice for appeals hearing schedule for September 27th at 10:30 AM.    CM also assisted Client complete and mail appeal/hearing confirmation yellow card to [facility] at:  [address] [facility] and [address] [employer], [address], [address]    Client reports no mail received re: SSI approval.    CM and Client discussed employment interest and OVR referral.  Client has not received any information/calls from [facility]. CM called assigned OVR counselor (Bruce Shelby: [phone]) and left message asking for call back. CM also emailed OVR Supervisor Genny Bergman: [email or web address]) requesting status on referral and call back from assigned counselor. CM  TITUS Ortiz) included in email. OVR Supervisor replied by stating that she forwarded the email to the counselor asking him to reach out to this CM. Counselor's email provided: [email or web address]. Moments later, CM received call from counselor. Referral status and Client's interest in employment assistance services discussed. Counselor agreed to reach out to Client.

## 2023-09-15 ENCOUNTER — PATIENT OUTREACH (OUTPATIENT)
Dept: SURGERY | Facility: CLINIC | Age: 43
End: 2023-09-15
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
## 2023-09-15 NOTE — PROGRESS NOTES
Client called CM. Client reports has not received HIV med refills, has enough medication for one week, asked CM for assistance obtaining refills. Client also reports no information received re: SSI application status. CM case conference with clinic STEVE Perez) and discussed Client's need for HIV medication refills. Moments later, MA reports spoke to PCP re: HIV med refills. Refills sent to pharmacy. CM called Client and provided update.

## 2023-09-18 NOTE — PROGRESS NOTES
CM called Client to follow up with OVR referral.  Client reports not receiving call from [facility] worker. CM called OVR worker (Shamir Briones: [phone]) to follow up with referral, message left asking for call back. Client reports feeling depression like symptoms: lack of appetite, persistent sadness, unable to concentrate. Client asked CM to status on Psych referral.    CM called [address] [place] [address] ([phone]) and inquired about referral status. CM spoke to reception who reports Client's referral was received and added to wait list.   Wait list is about six months long. CM called Client and provided update.
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
## 2023-09-27 ENCOUNTER — PATIENT OUTREACH (OUTPATIENT)
Dept: SURGERY | Facility: CLINIC | Age: 43
End: 2023-09-27

## 2023-09-27 DIAGNOSIS — B20 AIDS (ACQUIRED IMMUNE DEFICIENCY SYNDROME) (HCC): ICD-10-CM

## 2023-09-27 DIAGNOSIS — B20 SYMPTOMATIC HIV INFECTION (HCC): ICD-10-CM

## 2023-09-27 RX ORDER — EMTRICITABINE AND TENOFOVIR ALAFENAMIDE 200; 25 MG/1; MG/1
1 TABLET ORAL EVERY MORNING
Qty: 90 TABLET | Refills: 0 | Status: SHIPPED | OUTPATIENT
Start: 2023-09-27

## 2023-09-27 RX ORDER — DOLUTEGRAVIR SODIUM 50 MG/1
50 TABLET, FILM COATED ORAL EVERY MORNING
Qty: 90 TABLET | Refills: 0 | Status: SHIPPED | OUTPATIENT
Start: 2023-09-27

## 2023-09-27 RX ORDER — MULTIVITAMIN WITH FOLIC ACID 400 MCG
1 TABLET ORAL EVERY MORNING
Qty: 90 TABLET | Refills: 1 | Status: SHIPPED | OUTPATIENT
Start: 2023-09-27

## 2023-10-02 ENCOUNTER — PATIENT OUTREACH (OUTPATIENT)
Dept: SURGERY | Facility: CLINIC | Age: 43
End: 2023-10-02

## 2023-10-02 NOTE — PROGRESS NOTES
Client called CM. Client reports received letter from [facility], agreed to stop by later to review it. Moments later, CM met with Client. Client provided letter to [facility] for review. CM informed Client of DHS letter informing him of acknowledgement of appeal withdrawal. No further steps needed. Client reports receiving text from social security office informing of phone interview scheduled for today at 2 PM.  Client agree to contact this CM if call is received for assistance with interpretation. Moments later, CM called Client to follow up with [facility] phone appointment. Client reports no call was received, agree to keep CM up to date.

## 2023-10-05 ENCOUNTER — PATIENT OUTREACH (OUTPATIENT)
Dept: SURGERY | Facility: CLINIC | Age: 43
End: 2023-10-05

## 2023-10-05 NOTE — PROGRESS NOTES
Client called CM, reported received letter from [facility] re: Medicaid. Client also reports in need of HIV med refills. CM transferred Client to clinic to request med refills and encouraged to bring letter for review. Moments later, CM met with Client. Client was able to talk to clinic and place med refills. CM reviewed [facility] letter, [address] Client he is eligible for medical assistance (see attachment). OVR referral discussed. CM and Client called OVR Counselor (Mathieu Lynn: [phone]) and discussed Client's need for vocational and employment services. OVR counselor reports Client's case recently closed. OVR counselor agree to place request for services, Client should receive letter via mail informing of his reassignment for services. Once letter is received, Client encouraged to call OVR counselor. Client in agreement. Clinet reminded of [facility] phone interview scheduled for 10/5/2023 at 9:30 am.     Client agreed to met with this CM on such date for assistance with SSDI interview.

## 2023-10-05 NOTE — PROGRESS NOTES
CM met with Client. CM assisted Client and served as  during today's scheduled SSDI phone interview appointment with [place] [facility] office. CM, Client and SSA worker discussed Client's income, family composition, employment/incarceration history and identity theft. Once application/interview completed, Client was asked to visit the local [facility] office and sign a income statement recognizing that he was not employed during the years 2020, 2021, 2022 and 2023. Client agreed to visit local [facility] [place] office and sign income statment ([address] [place] [address] St.). Client provided CM with letters received from [facility] new insurance card, OVR referral acceptance letter and latest [employer] Report (see attachment).

## 2023-10-09 ENCOUNTER — PATIENT OUTREACH (OUTPATIENT)
Dept: SURGERY | Facility: CLINIC | Age: 43
End: 2023-10-09

## 2023-10-09 NOTE — PROGRESS NOTES
CM met with Client. Client provided CM with copy of SSA-795 form (Statement of Claimant) pertaining to his application for disability (see attachment). CM revise form and informed Client that by signing this form he was acknowledging that he was employed during 2020 & 2021 for the following companies: [employer], [employer], and [employer]. Client acknowledged not working in 2020 & 2021 for [employer]. Client verbalized understanding and in agreement with the signed statement. Client reminded of PCP appt scheduled for 10/23/23. Client pleased with assistance, no other needs reported.

## 2023-10-11 ENCOUNTER — PATIENT OUTREACH (OUTPATIENT)
Dept: SURGERY | Facility: CLINIC | Age: 43
End: 2023-10-11

## 2023-10-11 NOTE — PROGRESS NOTES
Client called CM to report received letter from [place] [facility] [place] office. Client agreed to drop off letters for CM to review. Moments later, CM received letter and called Client. CM informed Client that [place] [facility] mailed him a copy of the disability phone interview summary for his records. No additional steps needed. Client pleased with assistance, no other needs discussed/reported.

## 2023-10-13 ENCOUNTER — TELEPHONE (OUTPATIENT)
Dept: SURGERY | Facility: CLINIC | Age: 43
End: 2023-10-13

## 2023-10-13 NOTE — TELEPHONE ENCOUNTER
LVM for patient to call me back. Wanted to remind him to get labs done for his upcoming ID markel on 10/25.

## 2023-10-16 ENCOUNTER — APPOINTMENT (OUTPATIENT)
Dept: LAB | Facility: CLINIC | Age: 43
End: 2023-10-16
Payer: COMMERCIAL

## 2023-10-16 ENCOUNTER — PATIENT OUTREACH (OUTPATIENT)
Dept: SURGERY | Facility: CLINIC | Age: 43
End: 2023-10-16

## 2023-10-16 ENCOUNTER — TELEPHONE (OUTPATIENT)
Dept: SURGERY | Facility: CLINIC | Age: 43
End: 2023-10-16

## 2023-10-16 DIAGNOSIS — A53.9 SYPHILIS: ICD-10-CM

## 2023-10-16 LAB
ALBUMIN SERPL BCP-MCNC: 4.5 G/DL (ref 3.5–5)
ALP SERPL-CCNC: 76 U/L (ref 34–104)
ALT SERPL W P-5'-P-CCNC: 14 U/L (ref 7–52)
ANION GAP SERPL CALCULATED.3IONS-SCNC: 9 MMOL/L
AST SERPL W P-5'-P-CCNC: 21 U/L (ref 13–39)
BASOPHILS # BLD AUTO: 0.03 THOUSANDS/ÂΜL (ref 0–0.1)
BASOPHILS NFR BLD AUTO: 1 % (ref 0–1)
BILIRUB SERPL-MCNC: 0.38 MG/DL (ref 0.2–1)
BUN SERPL-MCNC: 12 MG/DL (ref 5–25)
CALCIUM SERPL-MCNC: 10 MG/DL (ref 8.4–10.2)
CHLORIDE SERPL-SCNC: 103 MMOL/L (ref 96–108)
CO2 SERPL-SCNC: 28 MMOL/L (ref 21–32)
CREAT SERPL-MCNC: 1.03 MG/DL (ref 0.6–1.3)
EOSINOPHIL # BLD AUTO: 0.07 THOUSAND/ÂΜL (ref 0–0.61)
EOSINOPHIL NFR BLD AUTO: 1 % (ref 0–6)
ERYTHROCYTE [DISTWIDTH] IN BLOOD BY AUTOMATED COUNT: 13.2 % (ref 11.6–15.1)
GFR SERPL CREATININE-BSD FRML MDRD: 89 ML/MIN/1.73SQ M
GLUCOSE P FAST SERPL-MCNC: 95 MG/DL (ref 65–99)
HCT VFR BLD AUTO: 43.3 % (ref 36.5–49.3)
HGB BLD-MCNC: 14.6 G/DL (ref 12–17)
IMM GRANULOCYTES # BLD AUTO: 0.01 THOUSAND/UL (ref 0–0.2)
IMM GRANULOCYTES NFR BLD AUTO: 0 % (ref 0–2)
LYMPHOCYTES # BLD AUTO: 0.95 THOUSANDS/ÂΜL (ref 0.6–4.47)
LYMPHOCYTES NFR BLD AUTO: 18 % (ref 14–44)
MCH RBC QN AUTO: 32.7 PG (ref 26.8–34.3)
MCHC RBC AUTO-ENTMCNC: 33.7 G/DL (ref 31.4–37.4)
MCV RBC AUTO: 97 FL (ref 82–98)
MONOCYTES # BLD AUTO: 0.46 THOUSAND/ÂΜL (ref 0.17–1.22)
MONOCYTES NFR BLD AUTO: 9 % (ref 4–12)
NEUTROPHILS # BLD AUTO: 3.82 THOUSANDS/ÂΜL (ref 1.85–7.62)
NEUTS SEG NFR BLD AUTO: 71 % (ref 43–75)
NRBC BLD AUTO-RTO: 0 /100 WBCS
PLATELET # BLD AUTO: 224 THOUSANDS/UL (ref 149–390)
PMV BLD AUTO: 10.1 FL (ref 8.9–12.7)
POTASSIUM SERPL-SCNC: 4.3 MMOL/L (ref 3.5–5.3)
PROT SERPL-MCNC: 7.8 G/DL (ref 6.4–8.4)
RBC # BLD AUTO: 4.46 MILLION/UL (ref 3.88–5.62)
SODIUM SERPL-SCNC: 140 MMOL/L (ref 135–147)
WBC # BLD AUTO: 5.34 THOUSAND/UL (ref 4.31–10.16)

## 2023-10-16 PROCEDURE — 36415 COLL VENOUS BLD VENIPUNCTURE: CPT

## 2023-10-16 PROCEDURE — 86780 TREPONEMA PALLIDUM: CPT

## 2023-10-16 PROCEDURE — 86592 SYPHILIS TEST NON-TREP QUAL: CPT

## 2023-10-16 PROCEDURE — 86593 SYPHILIS TEST NON-TREP QUANT: CPT

## 2023-10-17 ENCOUNTER — DOCUMENTATION (OUTPATIENT)
Dept: SURGERY | Facility: CLINIC | Age: 43
End: 2023-10-17

## 2023-10-17 ENCOUNTER — PATIENT OUTREACH (OUTPATIENT)
Dept: SURGERY | Facility: CLINIC | Age: 43
End: 2023-10-17

## 2023-10-17 LAB
BASOPHILS # BLD AUTO: 0 X10E3/UL (ref 0–0.2)
BASOPHILS NFR BLD AUTO: 1 %
CD3+CD4+ CELLS # BLD: 110 /UL (ref 359–1519)
CD3+CD4+ CELLS NFR BLD: 12.2 % (ref 30.8–58.5)
EOSINOPHIL # BLD AUTO: 0.1 X10E3/UL (ref 0–0.4)
EOSINOPHIL NFR BLD AUTO: 2 %
ERYTHROCYTE [DISTWIDTH] IN BLOOD BY AUTOMATED COUNT: 12.7 % (ref 11.6–15.4)
HCT VFR BLD AUTO: 43.9 % (ref 37.5–51)
HGB BLD-MCNC: 14.7 G/DL (ref 13–17.7)
IMM GRANULOCYTES # BLD: 0 X10E3/UL (ref 0–0.1)
IMM GRANULOCYTES NFR BLD: 0 %
LYMPHOCYTES # BLD AUTO: 0.9 X10E3/UL (ref 0.7–3.1)
LYMPHOCYTES NFR BLD AUTO: 17 %
MCH RBC QN AUTO: 33 PG (ref 26.6–33)
MCHC RBC AUTO-ENTMCNC: 33.5 G/DL (ref 31.5–35.7)
MCV RBC AUTO: 99 FL (ref 79–97)
MONOCYTES # BLD AUTO: 0.4 X10E3/UL (ref 0.1–0.9)
MONOCYTES NFR BLD AUTO: 7 %
NEUTROPHILS # BLD AUTO: 3.8 X10E3/UL (ref 1.4–7)
NEUTROPHILS NFR BLD AUTO: 73 %
PLATELET # BLD AUTO: 212 X10E3/UL (ref 150–450)
RBC # BLD AUTO: 4.45 X10E6/UL (ref 4.14–5.8)
RPR SER QL: REACTIVE
RPR SER-TITR: ABNORMAL {TITER}
TREPONEMA PALLIDUM IGG+IGM AB [PRESENCE] IN SERUM OR PLASMA BY IMMUNOASSAY: REACTIVE
WBC # BLD AUTO: 5.2 X10E3/UL (ref 3.4–10.8)

## 2023-10-17 NOTE — PROGRESS NOTES
Data: A referral was submitted to the [address] [place] [address], and MAT department per pt's request for co-occurrent services to address cannabis use, and mood dysregulations. The MAT program can't extend services at the time due to language barrier, and the psychiatric intake department can't accept pts that haven't sustain sobriety for 6 months. A case consult was developed with pt's assigned CM, Laura Alfred, to explore suitable options. CM advised to wait until tomorrow after pt's interview with the [facility] to explore assistance regarding co-occurrent services.

## 2023-10-18 ENCOUNTER — PATIENT OUTREACH (OUTPATIENT)
Dept: SURGERY | Facility: CLINIC | Age: 43
End: 2023-10-18

## 2023-10-18 ENCOUNTER — TELEPHONE (OUTPATIENT)
Dept: SURGERY | Facility: CLINIC | Age: 43
End: 2023-10-18

## 2023-10-18 LAB
HIV1 RNA # PLAS NAA DL=20: ABNORMAL {COPIES}/ML
T PALLIDUM AB SER QL IF: REACTIVE

## 2023-10-18 NOTE — TELEPHONE ENCOUNTER
Spoke wit patient and let him know PCP didn't not order nicotine patches do to an allergy he has. Patient verbalizes understanding.

## 2023-10-23 ENCOUNTER — OFFICE VISIT (OUTPATIENT)
Dept: SURGERY | Facility: CLINIC | Age: 43
End: 2023-10-23

## 2023-10-23 ENCOUNTER — PATIENT OUTREACH (OUTPATIENT)
Dept: SURGERY | Facility: CLINIC | Age: 43
End: 2023-10-23

## 2023-10-23 ENCOUNTER — DOCUMENTATION (OUTPATIENT)
Dept: SURGERY | Facility: CLINIC | Age: 43
End: 2023-10-23

## 2023-10-23 VITALS
SYSTOLIC BLOOD PRESSURE: 123 MMHG | HEART RATE: 65 BPM | RESPIRATION RATE: 16 BRPM | WEIGHT: 189 LBS | DIASTOLIC BLOOD PRESSURE: 57 MMHG | BODY MASS INDEX: 23.5 KG/M2 | TEMPERATURE: 96.7 F | OXYGEN SATURATION: 100 % | HEIGHT: 75 IN

## 2023-10-23 DIAGNOSIS — A53.9 SYPHILIS: ICD-10-CM

## 2023-10-23 DIAGNOSIS — B20 CURRENTLY ASYMPTOMATIC HIV INFECTION, WITH HISTORY OF HIV-RELATED ILLNESS (HCC): ICD-10-CM

## 2023-10-23 DIAGNOSIS — F33.2 SEVERE EPISODE OF RECURRENT MAJOR DEPRESSIVE DISORDER, WITHOUT PSYCHOTIC FEATURES (HCC): ICD-10-CM

## 2023-10-23 DIAGNOSIS — F12.10 MARIJUANA ABUSE: ICD-10-CM

## 2023-10-23 DIAGNOSIS — R41.89 COGNITIVE IMPAIRMENT: ICD-10-CM

## 2023-10-23 DIAGNOSIS — T65.222D TOXIC EFFECT OF TOBACCO CIGARETTE, INTENTIONAL SELF-HARM, SUBSEQUENT ENCOUNTER: ICD-10-CM

## 2023-10-23 DIAGNOSIS — K06.1 GUM HYPERPLASIA: ICD-10-CM

## 2023-10-23 DIAGNOSIS — Z23 NEED FOR INFLUENZA VACCINATION: Primary | ICD-10-CM

## 2023-10-23 DIAGNOSIS — K08.9 POOR DENTITION: ICD-10-CM

## 2023-10-23 PROBLEM — Z29.89 NEED FOR PNEUMOCYSTIS PROPHYLAXIS: Status: RESOLVED | Noted: 2021-06-24 | Resolved: 2023-10-23

## 2023-10-23 RX ORDER — CHLORHEXIDINE GLUCONATE ORAL RINSE 1.2 MG/ML
15 SOLUTION DENTAL 2 TIMES DAILY
Qty: 473 ML | Refills: 2 | Status: SHIPPED | OUTPATIENT
Start: 2023-10-23

## 2023-10-23 NOTE — PROGRESS NOTES
[facility]OPE Annual Nutrition Assessment    Millie Bravo is a 44 y.o. male  seen by RD in conjunction with PCP f/u   Keviniris Dean is established patient (last annual was 10/5/2022)    PMHx:  poor dentition, depression, PTSD, anxiety, mental impairment, prediabetes       Clinical Data/Client History    CD4 count:  110  Viral load:  <20  ART:   Descovy and Tivicay      , Patient Navigator: Dhruv Valencia Coordinator: n/a    Food assistance: SNAP and Food pantry    Living situation: [employer] or apartment  and in [facility] factors: Depression  anxiety  cognitive delay PTSD    Mobility:  self ambulates    Physical activity: Exercises most days for 2-3 hours (I.e. strength training, boxing, basketball)       Oral health concerns:  Poor dentition, working with CM to link in with dental care       Typical food/beverage intake:  Reports eating out ~2x/week, otherwise cooks or mom cooks     Breakfast nothing   Lunch main meal: I.e. pasta, meat, vianda  Dinner (combined with lunch)   Snacks cereal with milk sometimes   Beverages coffee with cream and sugar, water   Also mentioned fish, carrots, plantains     Appetite: Fluctuating    OTC vitamin, mineral, herbal supplements: OTC protein shake from [employer] (could not specify), 1 scoop/serving per day     Oral/enteral nutrition supplements:  n/a    GI problems: denied n/v/d/c    Food allergies/intolerances:  NKFA    Weight history:  183# (Apr-23)  181# (Jul-23)      Current body weight:  189# (85.7 kg)  Height:  6' 3" (1.905 m)  BMI:  23.62  IBW:  196# (89.1 kg)  %IBW  96%    Weight change: 8# increase x 3 months     Time period of weight change: 3 months      Nutrition-related labs:    Lab Results   Component Value Date    HGBA1C 5.6 01/11/2023    HGBA1C 5.6 01/11/2022    HGBA1C 5.9 (H) 06/15/2021     Lab Results   Component Value Date    GLUF 95 10/16/2023    [facility] 90 01/11/2023    CREATININE 1.03 10/16/2023     Lab Results   Component Value Date    CHOLESTEROL 164 01/11/2023    CHOLESTEROL 180 01/11/2022    CHOLESTEROL 132 06/15/2021     Lab Results   Component Value Date    HDL 62 01/11/2023    HDL 49 01/11/2022    HDL 40 06/15/2021     Lab Results   Component Value Date    TRIG 62 01/11/2023    TRIG 78 01/11/2022    TRIG 94 06/15/2021     Lab Results   Component Value Date    NONHDLC 102 01/11/2023    [address] 131 01/11/2022         Nutrition-related medications:     Current Outpatient Medications:     acetaminophen (TYLENOL) 650 mg CR tablet, Take 1 tablet (650 mg total) by mouth every 8 (eight) hours as needed for mild pain, Disp: 30 tablet, Rfl: 0    albuterol (Ventolin HFA) 90 mcg/act inhaler, Inhale 2 puffs every 6 (six) hours as needed for wheezing, Disp: 18 g, Rfl: 2    chlorhexidine (PERIDEX) 0.12 % solution, Apply 15 mL to the mouth or throat 2 (two) times a day, Disp: 473 mL, Rfl: 2    Cholecalciferol 125 MCG (5000 UT) capsule, Take 1 capsule (5,000 Units total) by mouth daily, Disp: 90 capsule, Rfl: 1    dolutegravir (Tivicay) 50 MG TABS, Take 1 tablet (50 mg total) by mouth every morning, Disp: 90 tablet, Rfl: 0    emtricitabine-tenofovir AF (Descovy) 200-25 MG tablet, Take 1 tablet by mouth every morning, Disp: 90 tablet, Rfl: 0    Multiple Vitamin (Tab-A-María) TABS, Take 1 tablet by mouth every morning, Disp: 90 tablet, Rfl: 1      Physical findings/skin integrity:  n/a      Nutrition Diagnosis    Problem: poor nutrition quality of life     Related to: depression  perception that time, interpersonal or financial constraints prevent changes  stressful life event or living situation     As Evidenced By: diet recall  patient interview  demonstrates inability to apply food/nutrition related information       Estimated Nutritional Needs    [facility] REE: 1845 kcal    ~6358-3974 kcal (based on: REE x 1.6, 30 kcal/kg BW)  ~90 g protein (based on: 1 g/kg BW)  ~3000 ml fluid (based on: 35 ml/kg BW)      Current intake estimation: not meeting needs      Nutrition Intervention/Recommendations    Nutrition education intervention: reinforced previous education     Nutrition recommendations: small frequent meals/snacks throughout the day for energy and weight maintenance, include fruits/vegetables, f/u with [facility] food pantry for food assistance     Supplement recommendations: Separate supplements at least 6 hrs from ART      Teaching Method: verbal     Person Educated: patient      Comprehension: fair    Receptivity: fair    Expected compliance: fair      Collaboration/referral of nutrition care:    (SNAP)  and (dental)  Nutrition f/u as needed as pt presents to clinic, recommend that he f/u with food pantry       Goals:  Avoid weight loss   Small frequent meals during the day   Balanced diet including fruits/vegetables   Calorie/protein dense snacks     Monitoring/Evaluation:  BW   Dietary patterns   Food security     Visit Summary    RD met with Zuleika Acevedo during scheduled PCP appt. Annual nutrition assessment was completed as per [employer] requirements. Zuleika Acevedo continues to c/o ongoing fluctuating appetite, sometimes he eats "a lot", while other days he eats very little. Again he was advised to include small frequent meals/snacks throughout the day, particularly considering how active he says he is. Recommended balanced diet and including fruits and vegetables, consider canned or frozen options for financial savings. He was concerned about weight; RD advised him that weight has desirably increased and weight is within healthy range. Zuleika Acevedo admits that he is "shy" about going to the food pantry. Reassurances were provided to pt that food pantry is open to those in need. Encouraged that he f/u with the pantry to acquire food for himself, which he was willing to consider.       Philippe Lloyd advised that he does not drink water due to hearing something on the radio that it is not safe. RD advised that he consider bottled water if he does not feel comfortable drinking tap, stressed importance of good hydration, and listening to local health authorities. Continue to monitor weight status, offer education and reminders as needed.       Chelsi Garibay, MS, RD, LDN

## 2023-10-23 NOTE — PROGRESS NOTES
Assessment/Plan:  Note addend to sign for flu vaccine given during visit   Currently asymptomatic HIV infection, with history of HIV-related illness ([address])  Doing well on Descovy and Tivicay with an undetectable VL. Pt reports 100% medication compliance on HAART. Stressed the importance of 100% medication adherence. HIV Counseling:    Viral Load: 20 copies    CD4 Count: 110      Denies side effects. Stressed the importance of adherence. Continue follow up in the ID clinic with Dr. Adonis Herbert or Dr. Gregoria Mercedes. Reviewed the most recent labs, including the CD4 and viral load. Discussed the risks and benefits of treatment options, instructions for management, importance of treatment adherence, and reduction of risk factors. Educated on possible medication side effects. Counseled on routes of HIV transmission, including the risk of  infection. Emphasized that viral suppression is the best method to prevent HIV transmission. At this time the pt denies the need for HIV testing of anyone in their life. Total encounter time was greater than 35 minutes. Greater than 20 minutes were spent on counseling and patient education. Pt voices understanding and agreement with treatment plan. Toxic effect of tobacco cigarette  Wishes to stop smoking. Smoking 3 cigarettes per day. He wants to stop smoking but was allergic to nicotine patches. He did not tolerate Wellbutrin due to side effects of wanting to harm himself. Do not think he is a good candidate for Chantix given black warning and MH concerns. Recommend not buying cigarettes   Will refer to [facility] for smoking cessation  does not endorse any fever, chills, nausea, vomiting, cough, SOB, diarrhea, or night sweats. Severe episode of recurrent major depressive disorder, without psychotic features ([address])  Denies any SI or HI. Has not heard from [facility] services.   F/u with [facility]   Place new referral to [facility]       Poor dentition  Denies any dental pain. Missed dental appt despite being called by CM and reminding of appt that day. Reorder Chlorhexidine  Follow up with dental  Reschedule dental appt    Syphilis  S/P treatment. Last RPR remains serofast at 1:2 dils  No need further treatment at this time  Monitor RPR       Marijuana abuse  Still continues to smoke marijuana daily. He is traumatized from father and witnessing brothers death over 27 years ago. Recommend smoking cessation since this is not helping pt and still feeling anxious  He feels like everyone his loves in life is gone except for his mother    Cognitive impairment  Requesting physical for learner's permit. Discuss concern about pt being illiterate and how he will be able to drive safely  Discussed with CM and pt was referred to [facility] and will let them handle his testing for cognition and ability to drive safely and observe driving signs            Diagnoses and all orders for this visit:    Currently asymptomatic HIV infection, with history of HIV-related illness ([address])    Toxic effect of tobacco cigarette, intentional self-harm, subsequent encounter    Severe episode of recurrent major depressive disorder, without psychotic features ([address])    Poor dentition  -     chlorhexidine (PERIDEX) 0.12 % solution; Apply 15 mL to the mouth or throat 2 (two) times a day    Gum hyperplasia  -     chlorhexidine (PERIDEX) 0.12 % solution; Apply 15 mL to the mouth or throat 2 (two) times a day    Syphilis    Marijuana abuse    Cognitive impairment          Subjective:      Patient ID: Xiao Genao is a 44 y.o. male. HPI  Pt is being seen for follow up visit for management of HIV and chronic health care conditions. Pt reports he is doing ok and does not have any concerns. He is asking for his learning permit. He does not endorse any fever, chills, nausea, vomiting, cough, SOB, diarrhea, or night sweats.     The following portions of the patient's history were reviewed and updated as appropriate: allergies, current medications, past family history, past medical history, past social history, past surgical history, and problem list.    Review of Systems   Constitutional: Negative. HENT: Negative. Respiratory: Negative. Cardiovascular: Negative. Gastrointestinal: Negative. Genitourinary: Negative. Neurological: Negative. Psychiatric/Behavioral: Negative. Objective:      /57 (BP Location: Right arm, Patient Position: Sitting, Cuff Size: Large)   Pulse 65   Temp (!) 96.7 °F (35.9 °C) (Tympanic)   Resp 16   Ht 6' 3" (1.905 m)   Wt 85.7 kg (189 lb)   SpO2 100%   BMI 23.62 kg/m²          Physical Exam  Vitals and nursing note reviewed. Constitutional:       General: He is not in acute distress. Appearance: Normal appearance. He is not ill-appearing. HENT:      Right Ear: Tympanic membrane normal.      Left Ear: Tympanic membrane normal.      Nose: Nose normal.      Mouth/Throat:      Mouth: Mucous membranes are moist.      Pharynx: Oropharynx is clear. No oropharyngeal exudate or posterior oropharyngeal erythema. Eyes:      Extraocular Movements: Extraocular movements intact. Pupils: Pupils are equal, round, and reactive to light. Cardiovascular:      Rate and Rhythm: Normal rate and regular rhythm. Chest Wall: PMI is not displaced. Heart sounds: Normal heart sounds. Pulmonary:      Effort: Pulmonary effort is normal.      Breath sounds: Normal breath sounds. Abdominal:      General: Bowel sounds are normal.      Palpations: Abdomen is soft. Musculoskeletal:         General: Normal range of motion. Lymphadenopathy:      Cervical: No cervical adenopathy. Skin:     General: Skin is warm and dry. Capillary Refill: Capillary refill takes less than 2 seconds. Neurological:      Mental Status: He is alert and oriented to person, place, and time. Mental status is at baseline.       Comments: Concern for cognitive decline since birth. Concern for illiteracy. Psychiatric:         Mood and Affect: Mood normal.         Behavior: Behavior normal.         Thought Content:  Thought content normal.         Judgment: Judgment normal.

## 2023-10-23 NOTE — ASSESSMENT & PLAN NOTE
Still continues to smoke marijuana daily. He is traumatized from father and witnessing brothers death over 27 years ago.   Recommend smoking cessation since this is not helping pt and still feeling anxious  He feels like everyone his loves in life is gone except for his mother

## 2023-10-23 NOTE — ASSESSMENT & PLAN NOTE
Doing well on Descovy and Tivicay with an undetectable VL. Pt reports 100% medication compliance on HAART. Stressed the importance of 100% medication adherence. HIV Counseling:    Viral Load: 20 copies    CD4 Count: 110      Denies side effects. Stressed the importance of adherence. Continue follow up in the ID clinic with Dr. Xenia Blanco or Dr. John Panda. Reviewed the most recent labs, including the CD4 and viral load. Discussed the risks and benefits of treatment options, instructions for management, importance of treatment adherence, and reduction of risk factors. Educated on possible medication side effects. Counseled on routes of HIV transmission, including the risk of  infection. Emphasized that viral suppression is the best method to prevent HIV transmission. At this time the pt denies the need for HIV testing of anyone in their life. Total encounter time was greater than 35 minutes. Greater than 20 minutes were spent on counseling and patient education. Pt voices understanding and agreement with treatment plan.

## 2023-10-23 NOTE — PROGRESS NOTES
Assessment/Plan: Pt was approached by [facility] before PCP's appt to explore multidimensional stability by completing the PHQ-9 screening. During today's contact, pt disclosed been struggling with fulfilling his social needs, describing been feeling helpless and hopeless. Pt described current living circumstances, along with lack of employment, and emotional instability. It was explored pt's coping mechanism which he disclosed been smoking cannabis to regulate his mood's instability. Pt became emotionally reactive while describing his desire to get multi-dimensionally stabilized. Pt's emotions and cognitions were validated throughout contact, along with receiving positive reinforcements for his awareness and self-sensitivity. A brief psycho-educational conversation was developed regarding co-occurrent services, and current difficulties to acquire assistance for him. Before session's completion, pt completed the PHQ-9 assessment, scoring 19  as a display of moderately severe depressive traits  with disclosures of helplessness, and hopelessness. No plans of self-harm were disclosed. On license of [facility]     Today patient present with   Chief Complaint   Patient presents with    3 m f/u     Patient would likely benefit from: Referral to co-occurrent services; referral to OVR, referral to ICM; smoking cessation counseling and sources. Consider/focus/continue: Monitoring pt's emotional, cognitive, and behavioral health's stability. Stage of change: Contemplation  Plan/ Behavioral Recommendations: Ongoing  interventions per pt's coordinated care, and/or pt's request of services.        Diagnoses and all orders for this visit:    Currently asymptomatic HIV infection, with history of HIV-related illness ([address])    Toxic effect of tobacco cigarette, intentional self-harm, subsequent encounter    Severe episode of recurrent major depressive disorder, without psychotic features ([address])    Poor dentition  -     chlorhexidine (PERIDEX) 0.12 % solution; Apply 15 mL to the mouth or throat 2 (two) times a day    Gum hyperplasia  -     chlorhexidine (PERIDEX) 0.12 % solution; Apply 15 mL to the mouth or throat 2 (two) times a day    Syphilis    Marijuana abuse    Cognitive impairment          Discussion:     Patient can reach out to [facility] if they experiences changes in their behavioral health. Subjective:     Patient ID: Zuleika Acevedo is a 44 y.o. male. HPI    History of Present Illness: The patient is seeing the [facility] today for a routine behavioral health follow up. Review of Systems      Objective:     Physical Exam      [facility]    Orientation     Person: yes    Place: yes    Time: yes    Appearance    Well Developed: yes healthy    Uncomfortable: yes    Normal Body Odor: yes    Smells of Feces: no    Smells of Urine: no    Disheveled: no    Well Nourished: yes weight WNL of ideal    Grooming Unkempt: yes    Poor Eye Contact: yes    Hirsute: yes    Looks Tired: yes    Acutely Exhausted: noappears older    Mood and Affect:     Appropriate: yes    Euthymicyes    Irritable: no    Angry: no    Anxious: yes    Depressed:yes    Blunted:no    Labile: yes    Restricted: no    Harm to Self or Others: Helplessness and hopelessness manifested without ideas of self-harm. Substance Abuse: Cannabis Use Disorder, Severe; Tobacco Use Disorder, Severe.

## 2023-10-23 NOTE — ASSESSMENT & PLAN NOTE
S/P treatment.   Last RPR remains serofast at 1:2 dils  No need further treatment at this time  Monitor RPR

## 2023-10-23 NOTE — ASSESSMENT & PLAN NOTE
Denies any dental pain. Missed dental appt despite being called by CM and reminding of appt that day.   Reorder Chlorhexidine  Follow up with dental  Reschedule dental appt

## 2023-10-23 NOTE — ASSESSMENT & PLAN NOTE
Requesting physical for learner's permit.   Discuss concern about pt being illiterate and how he will be able to drive safely  Discussed with CM and pt was referred to [facility] and will let them handle his testing for cognition and ability to drive safely and observe driving signs

## 2023-10-23 NOTE — ASSESSMENT & PLAN NOTE
Denies any SI or HI. Has not heard from [address] [place] [address] services.   F/u with [address] [place] [address] Dr new referral to [address] [place] [address]

## 2023-10-23 NOTE — ASSESSMENT & PLAN NOTE
Wishes to stop smoking. Smoking 3 cigarettes per day. He wants to stop smoking but was allergic to nicotine patches. He did not tolerate Wellbutrin due to side effects of wanting to harm himself. Do not think he is a good candidate for Chantix given black warning and  concerns. Recommend not buying cigarettes   Will refer to [facility] for smoking cessation  does not endorse any fever, chills, nausea, vomiting, cough, SOB, diarrhea, or night sweats.

## 2023-10-24 ENCOUNTER — PATIENT OUTREACH (OUTPATIENT)
Dept: SURGERY | Facility: CLINIC | Age: 43
End: 2023-10-24

## 2023-10-25 ENCOUNTER — PATIENT OUTREACH (OUTPATIENT)
Dept: SURGERY | Facility: CLINIC | Age: 43
End: 2023-10-25

## 2023-10-25 NOTE — PROGRESS NOTES
CM assisted Client complete and mail ACP program application and attached copy of Client's ID, Medicaid insurance and SNAP benefit card. (See attachment).

## 2023-10-25 NOTE — PROGRESS NOTES
CM met with Client. Client reports adherence to today's PCP appointment. Client states receiving HIV med refills and taking as prescribed, no adverse side effects reported. Client reports having connection issues with his cell phone, called the ACP (affordable connectivity program) to troubleshoot and was told he does not have internet access and told he needs to complete and submit ACP application to have access to internet. Client bought copy of letter and CM assisted Client in completing it. Client also reports his mother is interested in applyig for home internet. CM texted copy of ACP website link to Client's mother and provided instructions on how to apply. CM also case conference with Client and [facility] re: psych referral needs. PHQ-9 completed. CM and [facility] discussed possible referral to [facility] program for psych/mental health and residential service. CM agreed to inquire. CM also case conference with PCP re: Client's interest in 's learner permit.  CM updated PCP on OVR referral and how they can assist.

## 2023-10-27 ENCOUNTER — TELEPHONE (OUTPATIENT)
Dept: SURGERY | Facility: CLINIC | Age: 43
End: 2023-10-27

## 2023-10-27 NOTE — TELEPHONE ENCOUNTER
Data: Pt called at 7:40 a.m. requesting to reach out to [facility]. Pt was contacted at 8:40 a.m. to explore pt's needs at the time. Pt requested assistance to acquire winter clothes, toiletries, a gift card, to explore the benefits of full or partial disability, along with inquiring about therapeutic services. It was mentioned to pt that his needs will be discussed with assigned CM to explore extended support if available. No SI or HI were disclosed throughout contact.

## 2023-10-30 ENCOUNTER — PATIENT OUTREACH (OUTPATIENT)
Dept: SURGERY | Facility: CLINIC | Age: 43
End: 2023-10-30

## 2023-10-31 ENCOUNTER — PATIENT OUTREACH (OUTPATIENT)
Dept: SURGERY | Facility: CLINIC | Age: 43
End: 2023-10-31

## 2023-10-31 NOTE — PROGRESS NOTES
CM case conference with S (PAUL Lopez) re: Client interest in winter coal referral, target gift card and cable service for low income individuals. CM agreed to reach out to Client and follow up with needs.

## 2023-10-31 NOTE — PROGRESS NOTES
Client's case discussed during huddle. CM met with Client. Client brought letters received for review. CM reviewed letter and informed Client he was being notified by [employer] office that they have completed the renew of his earning records and made changes to reflect the new information, copy send to Client for records (see attachment). Client also received a medical bill from [facility] on the amount of $246. CM, Client and [facility] discussed Client's mental health and psych referral status. [facility] agreed to submit referral to [facility] program.  CM agreed to discuss psych referral options with OVR program.     [facility] interview call was not received. CM called OVR worker (Oleary Laisha: [phone]) but there was no response, message left asking for call back.

## 2023-10-31 NOTE — PROGRESS NOTES
CM case conference with PCP ANNA Sanchez) who reported receiving call from Client stating dental pain and swelling. CM agreed to follow up with Client. CM called Client and discussed dental needs. Client reports woke up thi morning with swollen jaw due to dental pain and swelling. Client fears has an infection. CM encouraged Client to visit [facility] located at [address] for emergency dental.  Client agreed to do so.

## 2023-11-02 ENCOUNTER — PATIENT OUTREACH (OUTPATIENT)
Dept: SURGERY | Facility: CLINIC | Age: 43
End: 2023-11-02

## 2023-11-02 NOTE — PROGRESS NOTES
Client called CM. Client reports concerns over lack of contact received from [employer] office regarding his SSI and SSDI application. CM encouraged Client to contact [facility] office and inquire. DEVIN provided Client with [employer] office number: [phone]. Moments later, Client called to report spoke to [employer] worker and was told he needs to provide bank account information for his benefits to be sent there. Client agree to open bank account tomorrow with assistance from his step father and provide information to [employer] office. O[facility] referral status discussed. Client reports has not heard from [address] [place] [address] Dr. BELTRAN agreed to reach out to [facility] counselor and inquire. DEVIN emailed OVR Counselor ( Parr Frost, [email or web address]') informing of this CM's availability next week Monday, Wednesday and Thursday from 9:30 am to 11:30 am to complete Client's OVR phone interview. Response pending.

## 2023-11-03 NOTE — PROGRESS NOTES
CM called Client and inquired of any calls received from [facility] program.  Client reports has not received any calls. Client expressed concern because he has not received any notices re: SSI/SSDI application. Client also reports in need of med refills. CM encouraged Client to contact [facility] office and inquire about his SSI/SSDI case. DEVIN case conference with clinic MA and informed of Client's need for med refills. Client call transferred to [facility].

## 2023-11-03 NOTE — PROGRESS NOTES
Client called CM. Client reports receiving OVR referral approval letter. CM agreed to contact OVR counselor and coordinate phone interview appointment. CM called OVR counselor (Gloria Cobb: [phone]) and reported Client received referral aproval letter. OVR interview appt scheuled for tomorrow (10/17/23) at 9 am.    CM called Cleint and provided update. CM and Client agreed to meet tomorrow for interview. CM case conference with [facility], discussed Client's MH status and OVR referral.  CM agreed to update [facility].

## 2023-11-06 ENCOUNTER — PATIENT OUTREACH (OUTPATIENT)
Dept: SURGERY | Facility: CLINIC | Age: 43
End: 2023-11-06

## 2023-11-07 ENCOUNTER — PATIENT OUTREACH (OUTPATIENT)
Dept: SURGERY | Facility: CLINIC | Age: 43
End: 2023-11-07

## 2023-11-07 ENCOUNTER — TELEPHONE (OUTPATIENT)
Dept: SURGERY | Facility: CLINIC | Age: 43
End: 2023-11-07

## 2023-11-07 NOTE — TELEPHONE ENCOUNTER
Data: Pt contacted [facility] at 10:45 a.m. to inquire abut opening a bank account for SS purposes, and his willingness to bring copies of the bank account. Pt was advised to provide documentation to assigned CM. Pt proceeded to request assistance about independent living placement, along with wanting a gift card to buy winter clothes. 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 Pt was advised about the importance of following the rules of the assisted living opportunity, along with reminding pt that the gifts cards are provided to new admissions in need of assistance. Pt requested to coordinate [id number] [place] [address] services upon his stabilization. No SI or HI were disclosed within brief contact. A brief case consult was developed with pt's assigned CM, exploring the  possibility of applying for an ICM for pt. The benefit may be discussed with pt upon his next f/u.

## 2023-11-08 ENCOUNTER — PATIENT OUTREACH (OUTPATIENT)
Dept: SURGERY | Facility: CLINIC | Age: 43
End: 2023-11-08

## 2023-11-08 NOTE — PROGRESS NOTES
Client called CM. Client reports was able to submit new bank information to [facility] office but has not received any updated re: case status. CM encouraged Client to call [facility] office in [place] and inquire. Client agreed to do so and report outcome.

## 2023-11-08 NOTE — PROGRESS NOTES
DEVIN case conference with [facility] re: Client's mental health, social security benefit and housing status. CM and [facility] agree to continue to work and assist Client whenever possible.

## 2023-11-13 ENCOUNTER — PATIENT OUTREACH (OUTPATIENT)
Dept: SURGERY | Facility: CLINIC | Age: 43
End: 2023-11-13

## 2023-11-13 NOTE — PROGRESS NOTES
CM called Client. OVR and SSA status discussed. CM called OVR worker (Marva Blowers: [phone]) along with Client to follow up with referral status. OVR working not available and message left asking for call back. Client reports called SSA [place] office to follow up with SSDI/SSI case status but unable to reach anyone. CM encouraged Client to contact the main [facility] office number: [phone] to inquire. Client agreed to call and report outcome.

## 2023-11-14 ENCOUNTER — PATIENT OUTREACH (OUTPATIENT)
Dept: SURGERY | Facility: CLINIC | Age: 43
End: 2023-11-14

## 2023-11-14 NOTE — PROGRESS NOTES
Client called CM. Client reports in need of winter coat. Client reports received letter from [facility] office, asked to meet this CM to review letter. CM informed Client that [address] [place] [address] is provide winter coats for adults for free, encouraged Client to visit program.  Client agreed to visit program and later met with this CM. Moments later, CM met with Client. Client able to obtain winter coat from the [address] [place] [address]. CM reviewed SSA letter and informed Client he has a phone appointment with [facility] office at 2:30 pm to review his case and bank account information to start receiving SSI payments. Letter as Client's mother cell phone number as the registered contact for the phone interview. Client reports mother got into an argument with his partner and he broke her cell phone. Mother's number cannot be used. Client was able to get new cell phone for his mother and asked CM for assistance in calling SSA office to update number. CM called [facility] office ([phone]) and updated Client's phone contact information. CM and Client agree to meet tomorrow at 2:30 pm to assist with phone interview.

## 2023-11-17 ENCOUNTER — PATIENT OUTREACH (OUTPATIENT)
Dept: SURGERY | Facility: CLINIC | Age: 43
End: 2023-11-17

## 2023-11-20 ENCOUNTER — PATIENT OUTREACH (OUTPATIENT)
Dept: SURGERY | Facility: CLINIC | Age: 43
End: 2023-11-20

## 2023-11-20 NOTE — PROGRESS NOTES
Client called CM. Client reports was able to complete phone interview with SSA. Client was able to confirm and update SSA re: income and bank account information. Client reports told his info was updated and will receive his first SSI check within a few days. Housing needs discussed. CM reminded Client that once he starts receiving SSI benefits he can apply for public housing.   Client agreed to update CM once benefit is received to work on housing goal.

## 2023-11-22 ENCOUNTER — PATIENT OUTREACH (OUTPATIENT)
Dept: SURGERY | Facility: CLINIC | Age: 43
End: 2023-11-22

## 2023-11-28 ENCOUNTER — PATIENT OUTREACH (OUTPATIENT)
Dept: SURGERY | Facility: CLINIC | Age: 43
End: 2023-11-28

## 2023-11-28 NOTE — PROGRESS NOTES
CM case conference with  [facility] [place]) re: [facility] [place] annual screening update. CM updated Client's annual screening in [facility].

## 2023-11-29 ENCOUNTER — PATIENT OUTREACH (OUTPATIENT)
Dept: SURGERY | Facility: CLINIC | Age: 43
End: 2023-11-29

## 2023-11-29 NOTE — PROGRESS NOTES
Client called CM. Client reports receiving multiple letters and asked CM for assistance reviewing letter. Client agree to drop off letters with reception. Moments later, CM received letters and review them. CM called Client and reported the following:  - SSA letter informing Client that his SSI application was denied due to excess income. However, Client does not have income and letter does not state which income this decision is based on. CM encouraged Client to file a Request for Reconsideration (SSA-561 form). Cleint agreed to stop by tomorrow to sign form. - CM informed Client that his application for the internet [email or web address] program was denied. Client encouraged to apply again if interested. - CM informed Client that his was approved for Counts include 234 beds at the [facility] as his preferred Medicaid plan. - CM informed Client of SSA letter received informing that of [address] Order to collect overpayment of social security benefits paid to [facility] employer. Client explained not aware of what's being stated on this letter and asked CM for assistance in contacting [facility] office to inquire further. CM and Client agree to meet tomorrow.

## 2023-12-04 ENCOUNTER — PATIENT OUTREACH (OUTPATIENT)
Dept: SURGERY | Facility: CLINIC | Age: 43
End: 2023-12-04

## 2023-12-04 NOTE — PROGRESS NOTES
CM met with Client. CM assisted Client complete SNAP/CHIP renewal application. CM mailed application for [facility] office:   [address] [place] [address] Dr Lakhani ([place]), [address] [facility]    CM assisted Client complete SSA Appeals and Reconsideration form (CIT-654) and mailed form to the local [employer] office:  [address] [place] [address]    Client reports not receiving any information on SSDI benefits, asked CM for assistance contacting local SSA office to inquire. CM called local [facility] office ([phone]) along with client and discussed case status with SSA worker. Worker asked for information on Client's history of incarceration. CM informed worker of information previously submitted in person and via fax. [facility] worker asked CM to submit any information re: incarceration history via fax: [phone]. CM faxed copy of incarceration history letter provided by [facility] dept of corrections. Fax confirmation page received. Client pleased with assistance, agree to update CM if any information is received in response to SSI appeal and SNAP/CHIP case.

## 2023-12-14 ENCOUNTER — PATIENT OUTREACH (OUTPATIENT)
Dept: SURGERY | Facility: CLINIC | Age: 43
End: 2023-12-14

## 2023-12-15 DIAGNOSIS — Z11.3 ENCOUNTER FOR SCREENING FOR BACTERIAL SEXUALLY TRANSMITTED DISEASE: ICD-10-CM

## 2023-12-15 DIAGNOSIS — A53.9 SYPHILIS: ICD-10-CM

## 2023-12-15 DIAGNOSIS — B20 AIDS (ACQUIRED IMMUNE DEFICIENCY SYNDROME) (HCC): ICD-10-CM

## 2023-12-15 DIAGNOSIS — D72.89 OTHER SPECIFIED DISORDERS OF WHITE BLOOD CELLS: ICD-10-CM

## 2023-12-15 DIAGNOSIS — Z13.6 ENCOUNTER FOR LIPID SCREENING FOR CARDIOVASCULAR DISEASE: ICD-10-CM

## 2023-12-15 DIAGNOSIS — D72.810 LYMPHOPENIA: ICD-10-CM

## 2023-12-15 DIAGNOSIS — Z72.89 OTHER PROBLEMS RELATED TO LIFESTYLE: ICD-10-CM

## 2023-12-15 DIAGNOSIS — Z13.220 ENCOUNTER FOR LIPID SCREENING FOR CARDIOVASCULAR DISEASE: ICD-10-CM

## 2023-12-15 DIAGNOSIS — Z20.2 CONTACT WITH AND (SUSPECTED) EXPOSURE TO INFECTIONS WITH A PREDOMINANTLY SEXUAL MODE OF TRANSMISSION: ICD-10-CM

## 2023-12-15 DIAGNOSIS — B20 HIV DISEASE (HCC): Primary | ICD-10-CM

## 2023-12-15 DIAGNOSIS — Z11.1 SCREENING-PULMONARY TB: ICD-10-CM

## 2023-12-15 DIAGNOSIS — R73.03 PREDIABETES: ICD-10-CM

## 2023-12-15 DIAGNOSIS — B20 SYMPTOMATIC HIV INFECTION (HCC): ICD-10-CM

## 2023-12-15 RX ORDER — DOLUTEGRAVIR SODIUM 50 MG/1
50 TABLET, FILM COATED ORAL EVERY MORNING
Qty: 90 TABLET | Refills: 0 | Status: SHIPPED | OUTPATIENT
Start: 2023-12-15 | End: 2023-12-21

## 2023-12-15 RX ORDER — EMTRICITABINE AND TENOFOVIR ALAFENAMIDE 200; 25 MG/1; MG/1
1 TABLET ORAL EVERY MORNING
Qty: 90 TABLET | Refills: 0 | Status: SHIPPED | OUTPATIENT
Start: 2023-12-15 | End: 2023-12-21

## 2023-12-18 ENCOUNTER — PATIENT OUTREACH (OUTPATIENT)
Dept: SURGERY | Facility: CLINIC | Age: 43
End: 2023-12-18

## 2023-12-18 NOTE — PROGRESS NOTES
Client called CM.    Client reports received his first SSDI benefit payment last week via direct deposit.     Client reports pleased with outcome.    Client reports receive multiple letter from DHS and SSA office and asked to meet this CM to review letters.    CM and Client agree to meet tomorrow at 10 am to review letter and discussed public housing referral now that he has income.

## 2023-12-20 NOTE — PROGRESS NOTES
Client called CM.  Client reports feeling anxious and anger over no response received from [facility] re: SSDI application and financial struggles.    Client reports he has been able to visit the [facility] for food boxes and winter coat but does not want to rely of programs, instead would like to have financial situation in order.     Client encouraged Client to reach out to S to discuss emotional status and copying skills. Client declined.     CM also encouraged Client to contact [facility] local office at [phone] and inquire about case status.  Client agreed to call and report outcome.

## 2023-12-21 DIAGNOSIS — B20 AIDS (ACQUIRED IMMUNE DEFICIENCY SYNDROME) (HCC): ICD-10-CM

## 2023-12-21 DIAGNOSIS — B20 SYMPTOMATIC HIV INFECTION (HCC): ICD-10-CM

## 2023-12-21 RX ORDER — EMTRICITABINE AND TENOFOVIR ALAFENAMIDE 200; 25 MG/1; MG/1
1 TABLET ORAL EVERY MORNING
Qty: 90 TABLET | Refills: 1 | Status: SHIPPED | OUTPATIENT
Start: 2023-12-21

## 2023-12-21 RX ORDER — DOLUTEGRAVIR SODIUM 50 MG/1
50 TABLET, FILM COATED ORAL EVERY MORNING
Qty: 90 TABLET | Refills: 1 | Status: SHIPPED | OUTPATIENT
Start: 2023-12-21

## 2024-01-04 ENCOUNTER — PATIENT OUTREACH (OUTPATIENT)
Dept: SURGERY | Facility: CLINIC | Age: 44
End: 2024-01-04

## 2024-01-04 NOTE — PROGRESS NOTES
Client called to report in need of assistance with vocational and employment services.    Client has not heard from OVR counselor re: employment services for people with disabilities.    CM assisted Client by login into his [employer] account to checked OVR case status. Client's OVR case closed.    CM assisted Client complete and submit new OVR referral.    DEVIN informed Client he should received a letter from OVR program within 7 to 10 business days informing of assigned OVR counselor.  Client pleased with assistance.   No other needs reported.

## 2024-01-08 ENCOUNTER — PATIENT OUTREACH (OUTPATIENT)
Dept: SURGERY | Facility: CLINIC | Age: 44
End: 2024-01-08

## 2024-01-08 NOTE — PROGRESS NOTES
CM met with Client.  Client reports receiving SSA and DHS letter, asked CM for assistance reviewing letters.    CM reviewed letters, informed Client SSA is reporting that they took $13,788.79 of his retroactive benefits as a form of recoupment.   Client will receive $3,806.21 on December 22, 2023 and $1,094 thereafter beginning January 3, 2024.    CM also informed Client of DHS letter stating that his snap benefits were being discontinued because he failed to provide previously requested information.   Client claims he has not received such notice requesting additional information.    CM assisted Client with submitting [facility] appeal on snap benefits.   CM faxed appeal to the [place] [facility] office located at [address] [place] [address] in [place]. Confirmation page received.  CM also mail appeal form.  Client pleased with assistance.

## 2024-01-09 ENCOUNTER — TELEPHONE (OUTPATIENT)
Dept: SURGERY | Facility: CLINIC | Age: 44
End: 2024-01-09

## 2024-01-10 ENCOUNTER — PATIENT OUTREACH (OUTPATIENT)
Dept: SURGERY | Facility: CLINIC | Age: 44
End: 2024-01-10

## 2024-01-11 NOTE — PROGRESS NOTES
Client called CM.  Client reports received medical bill, not aware why and asked CM for assistance with addressing issue.    Client also inquired about OVR referral status.    CM informed Client OVR referral was placed and he should have received a approval letter along with counselor assigned to him.  Client reports has not received such letter but agreed to inform CM if received.    CM encouraged Client to bring copy of medical bill for review.  Client agreed to stop by leave copy with reception.    Client also reports spoke to clinic and told that he needs to do blood work.    CM reminded Client of upcoming ID appt on 1/17/2024 at 5:30 pm.  Client agreed to do blood work prior to appointment.   No other needs discussed.

## 2024-01-16 ENCOUNTER — PATIENT OUTREACH (OUTPATIENT)
Dept: SURGERY | Facility: CLINIC | Age: 44
End: 2024-01-16

## 2024-01-19 ENCOUNTER — PATIENT OUTREACH (OUTPATIENT)
Dept: SURGERY | Facility: CLINIC | Age: 44
End: 2024-01-19

## 2024-01-22 ENCOUNTER — PATIENT OUTREACH (OUTPATIENT)
Dept: SURGERY | Facility: CLINIC | Age: 44
End: 2024-01-22

## 2024-01-22 NOTE — PROGRESS NOTES
Client called CM.    Client reports has not received food stamps for two months not aware of status of SSI appeal.    CM reminded Client of snap appeals form submitted via fax on 1/8/2024 and SSI appeal submitted on 12/4/2023.    CM encouraged Client to contact DHS office, SSA office and inquire.    CM and Client agreed to meet tomorrow after his scheduled PCP appointment to review letters received.

## 2024-01-23 ENCOUNTER — OFFICE VISIT (OUTPATIENT)
Dept: SURGERY | Facility: CLINIC | Age: 44
End: 2024-01-23
Payer: COMMERCIAL

## 2024-01-23 ENCOUNTER — PATIENT OUTREACH (OUTPATIENT)
Dept: SURGERY | Facility: CLINIC | Age: 44
End: 2024-01-23

## 2024-01-23 VITALS
TEMPERATURE: 97 F | BODY MASS INDEX: 22.7 KG/M2 | HEART RATE: 80 BPM | SYSTOLIC BLOOD PRESSURE: 126 MMHG | OXYGEN SATURATION: 100 % | DIASTOLIC BLOOD PRESSURE: 74 MMHG | HEIGHT: 75 IN | RESPIRATION RATE: 18 BRPM | WEIGHT: 182.6 LBS

## 2024-01-23 DIAGNOSIS — K06.1 GUM HYPERPLASIA: ICD-10-CM

## 2024-01-23 DIAGNOSIS — A53.9 SYPHILIS: ICD-10-CM

## 2024-01-23 DIAGNOSIS — B20 CURRENTLY ASYMPTOMATIC HIV INFECTION, WITH HISTORY OF HIV-RELATED ILLNESS (HCC): Primary | ICD-10-CM

## 2024-01-23 DIAGNOSIS — T65.222D TOXIC EFFECT OF TOBACCO CIGARETTE, INTENTIONAL SELF-HARM, SUBSEQUENT ENCOUNTER: ICD-10-CM

## 2024-01-23 DIAGNOSIS — R06.2 WHEEZING WITHOUT DIAGNOSIS OF ASTHMA: ICD-10-CM

## 2024-01-23 DIAGNOSIS — K08.9 POOR DENTITION: ICD-10-CM

## 2024-01-23 PROCEDURE — 99214 OFFICE O/P EST MOD 30 MIN: CPT | Performed by: NURSE PRACTITIONER

## 2024-01-23 RX ORDER — ALBUTEROL SULFATE 90 UG/1
2 AEROSOL, METERED RESPIRATORY (INHALATION) EVERY 6 HOURS PRN
Qty: 18 G | Refills: 2 | Status: SHIPPED | OUTPATIENT
Start: 2024-01-23

## 2024-01-23 RX ORDER — CHLORHEXIDINE GLUCONATE ORAL RINSE 1.2 MG/ML
15 SOLUTION DENTAL 2 TIMES DAILY
Qty: 473 ML | Refills: 2 | Status: SHIPPED | OUTPATIENT
Start: 2024-01-23

## 2024-01-23 RX ORDER — MOMETASONE FUROATE AND FORMOTEROL FUMARATE DIHYDRATE 50; 5 UG/1; UG/1
2 AEROSOL RESPIRATORY (INHALATION)
Qty: 39 G | Refills: 5 | Status: SHIPPED | OUTPATIENT
Start: 2024-01-23 | End: 2024-01-24 | Stop reason: DRUGHIGH

## 2024-01-23 NOTE — ASSESSMENT & PLAN NOTE
He has not been dentist yet.  Continue to Chlorhexidine mouthwash  He needs someone to accompany him to dentist

## 2024-01-23 NOTE — ASSESSMENT & PLAN NOTE
Remains serofast at 1:2 dils down from 1:4 dils.  Previous treated with bicillin X 3.   Continue to monitor RPR every 6 months  Recommend 100% condom use

## 2024-01-23 NOTE — ASSESSMENT & PLAN NOTE
Feels like chest is tight and he needs to use his rescue inhaler.   Continue albuterol as needed   Will add Dulera inhaler with reminder to rinse mouth after use  Reviewed instructions with Moises in Jamaican

## 2024-01-23 NOTE — PROGRESS NOTES
CM met with Client.  Client reports adherence to today's PCP appointment.  Client discussed need for Medical Marijuana and dental referral.  CM case conference with PCP, who provided information and guidance on how to place medical marijuana referral via online through [place].    CM assisted Client with referral by contacting [facility] at [phone] and placing referral.  Client was informed of case number: [id number] and told will receive an email with instructions on how to complete registration.    Client provided CM with letters received:  - OVR approval letter and counselor assigned: Rony Husain ([phone]).  - [facility] Pre Hearing Request acknowledgement letter.    CM informed Client he will be getting another letter from [facility] informing of assigned hearing date/time.    CM called OVR counselor but was not available. CM left message asking for call back to initiate vocational/employment assistance services.    Dental needs discussed.  Client reports continues dental issues.  CM encourage Client to visit [facility] early in the morning as a walk-in and that he will need to wait to be seen if he wants to successfully address dental needs. Client agreed to visit dental clinic tomorrow morning.  No other needs reported.

## 2024-01-23 NOTE — ASSESSMENT & PLAN NOTE
Still smoking about 5 cigarettes/QD.  He reported allergic reaction to nicotine patches but was able to use patches without any reaction.   He wants to stop smoking.  Stressed the importance of 100% smoking cessation  Does not qualify for LDCT due to age   Agreed to nicotine patches 7 mg/24 hours  Nicotine Dependency - Primary    Counseled for greater than 15 minutes on the importance of smoking cessation.  Education was given regarding the cardiovascular effects of how nicotine affects the heart, lungs, kidneys, and peripheral vascular system.  Referred to [facility] for enrollment in smoking cessation program.

## 2024-01-23 NOTE — PROGRESS NOTES
Millie Lloyd presented for PCP f/u appt.  ANNA discussed case with GAUDENCIO, pt screened positive for food insecurity, lost 7# and lost SNAP.  RD was unable to meet with pt at time of PCP visit, however RD was able to briefly approach pt during his appt with DEVIN [place].  GAUDENCIO provided pt with emergency food donation and gift card to [employer].

## 2024-01-23 NOTE — PROGRESS NOTES
Assessment/Plan:    Currently asymptomatic HIV infection, with history of HIV-related illness (HCC)  Doing well on Descovy and Tivicay with an undetectable VL.  Pt reports 100% medication compliance on HAART.  Stressed the importance of 100% medication adherence  Monitor CD4. HIV RNA, CMP, and CBCD for virologic response and drug toxicities  Follow up with Dr. Parkinson or Dr. Billy   HIV Counseling:    Viral Load: detected at 20 copies    CD4 Count: 110      Denies side effects.  Stressed the importance of adherence.  Continue follow up in the ID clinic with Dr. Parkinson or Dr. Billy.    Reviewed the most recent labs, including the CD4 and viral load.  Discussed the risks and benefits of treatment options, instructions for management, importance of treatment adherence, and reduction of risk factors.  Educated on possible medication side effects.    Counseled on routes of HIV transmission, including the risk of  infection.  Emphasized that viral suppression is the best method to prevent HIV transmission.  At this time the pt denies the need for HIV testing of anyone in their life.    Total encounter time was greater than 35 minutes.  Greater than 20 minutes were spent on counseling and patient education.  Pt voices understanding and agreement with treatment plan.        Syphilis  Remains serofast at 1:2 dils down from 1:4 dils.  Previous treated with bicillin X 3.   Continue to monitor RPR every 6 months  Recommend 100% condom use     Toxic effect of tobacco cigarette  Still smoking about 5 cigarettes/QD.  He reported allergic reaction to nicotine patches but was able to use patches without any reaction.   He wants to stop smoking.  Stressed the importance of 100% smoking cessation  Does not qualify for LDCT due to age   Agreed to nicotine patches 7 mg/24 hours  Nicotine Dependency - Primary    Counseled for greater than 15 minutes on the importance of smoking cessation.  Education was given regarding the  cardiovascular effects of how nicotine affects the heart, lungs, kidneys, and peripheral vascular system.  Referred to [facility] for enrollment in smoking cessation program.       Poor dentition  He has not been dentist yet.  Continue to Chlorhexidine mouthwash  He needs someone to accompany him to dentist    Wheezing without diagnosis of asthma  Feels like chest is tight and he needs to use his rescue inhaler.   Continue albuterol as needed   Will add Dulera inhaler with reminder to rinse mouth after use  Reviewed instructions with Moises in Arabic       Diagnoses and all orders for this visit:    Currently asymptomatic HIV infection, with history of HIV-related illness (HCC)    Syphilis    Poor dentition  -     chlorhexidine (PERIDEX) 0.12 % solution; Apply 15 mL to the mouth or throat 2 (two) times a day    Gum hyperplasia  -     chlorhexidine (PERIDEX) 0.12 % solution; Apply 15 mL to the mouth or throat 2 (two) times a day    Wheezing without diagnosis of asthma  -     albuterol (Ventolin HFA) 90 mcg/act inhaler; Inhale 2 puffs every 6 (six) hours as needed for wheezing  -     Mometasone Furo-Formoterol Fum (Dulera) 50-5 MCG/ACT AERO; Inhale 2 puffs Daily at 2am Rinse mouth after use.    Toxic effect of tobacco cigarette, intentional self-harm, subsequent encounter  -     nicotine (NICODERM CQ) 7 mg/24hr TD 24 hr patch; Place 1 patch on the skin over 24 hours every 24 hours        Lab Results   Component Value Date    K 4.3 10/16/2023     10/16/2023    CO2 28 10/16/2023    BUN 12 10/16/2023    CREATININE 1.03 10/16/2023    GLUF 95 10/16/2023    CALCIUM 10.0 10/16/2023    AST 21 10/16/2023    ALT 14 10/16/2023    ALKPHOS 76 10/16/2023    EGFR 89 10/16/2023     Lab Results   Component Value Date    WBC 5.34 10/16/2023    WBC 5.2 10/16/2023    HGB 14.6 10/16/2023    HGB 14.7 10/16/2023    HCT 43.3 10/16/2023    HCT 43.9 10/16/2023    MCV 97 10/16/2023    MCV 99 (H) 10/16/2023     10/16/2023      "10/16/2023        Subjective:      Patient ID: Millie Lloyd is a 43 y.o. male.    HPI  Millie presents for 3 month follow up visit for management of HIV and chronic health care conditions.  Millie reports he has been ok.  He has lost 7 lb since he lost his food stamps.  He was stopped by policy on the street for smoking marijuana on the street was told to find out from his doctor so he did not get a ticket.  He was awarded social security disability. He has been working out and running.  He plans to get his labs done tomorrow.     He does not endorse any fever, chills, nausea, vomiting, cough, SOB, diarrhea, or night sweats.      The following portions of the patient's history were reviewed and updated as appropriate: allergies, current medications, past family history, past medical history, past social history, and problem list.    Review of Systems   Constitutional: Negative.    HENT: Negative.     Respiratory:  Positive for wheezing.    Cardiovascular: Negative.    Gastrointestinal: Negative.    Musculoskeletal: Negative.    Skin: Negative.    Neurological: Negative.    Psychiatric/Behavioral: Negative.           Objective:      /74 (BP Location: Right arm, Patient Position: Sitting, Cuff Size: Standard)   Pulse 80   Temp (!) 97 °F (36.1 °C) (Tympanic)   Resp 18   Ht 6' 3\" (1.905 m)   Wt 82.8 kg (182 lb 9.6 oz)   SpO2 100%   BMI 22.82 kg/m²          Physical Exam  Vitals and nursing note reviewed.   Constitutional:       General: He is not in acute distress.     Appearance: Normal appearance. He is not ill-appearing.   HENT:      Mouth/Throat:      Dentition: Abnormal dentition. Dental tenderness, gingival swelling, dental caries and gum lesions present.   Cardiovascular:      Rate and Rhythm: Normal rate and regular rhythm.      Chest Wall: PMI is not displaced.      Pulses: Normal pulses.      Heart sounds: Normal heart sounds.   Pulmonary:      Effort: Pulmonary effort is normal.      " Breath sounds: Wheezing present.      Comments: Expiratory wheezing in b/l bases.  Musculoskeletal:         General: Normal range of motion.   Skin:     General: Skin is warm and dry.      Capillary Refill: Capillary refill takes less than 2 seconds.   Neurological:      Mental Status: He is alert and oriented to person, place, and time.   Psychiatric:         Mood and Affect: Mood normal.         Behavior: Behavior normal.         Thought Content: Thought content normal.         Judgment: Judgment normal.

## 2024-01-23 NOTE — ASSESSMENT & PLAN NOTE
Doing well on Descovy and Tivicay with an undetectable VL.  Pt reports 100% medication compliance on HAART.  Stressed the importance of 100% medication adherence  Monitor CD4. HIV RNA, CMP, and CBCD for virologic response and drug toxicities  Follow up with Dr. Parkinson or Dr. Billy   HIV Counseling:    Viral Load: detected at 20 copies    CD4 Count: 110      Denies side effects.  Stressed the importance of adherence.  Continue follow up in the ID clinic with Dr. Parkinson or Dr. Billy.    Reviewed the most recent labs, including the CD4 and viral load.  Discussed the risks and benefits of treatment options, instructions for management, importance of treatment adherence, and reduction of risk factors.  Educated on possible medication side effects.    Counseled on routes of HIV transmission, including the risk of  infection.  Emphasized that viral suppression is the best method to prevent HIV transmission.  At this time the pt denies the need for HIV testing of anyone in their life.    Total encounter time was greater than 35 minutes.  Greater than 20 minutes were spent on counseling and patient education.  Pt voices understanding and agreement with treatment plan.

## 2024-01-24 DIAGNOSIS — R06.2 WHEEZING WITHOUT DIAGNOSIS OF ASTHMA: Primary | ICD-10-CM

## 2024-01-24 RX ORDER — MOMETASONE FUROATE AND FORMOTEROL FUMARATE DIHYDRATE 100; 5 UG/1; UG/1
2 AEROSOL RESPIRATORY (INHALATION) 2 TIMES DAILY
Qty: 13 G | Refills: 5 | Status: SHIPPED | OUTPATIENT
Start: 2024-01-24

## 2024-01-25 ENCOUNTER — PATIENT OUTREACH (OUTPATIENT)
Dept: SURGERY | Facility: CLINIC | Age: 44
End: 2024-01-25

## 2024-01-26 ENCOUNTER — TELEPHONE (OUTPATIENT)
Dept: SURGERY | Facility: CLINIC | Age: 44
End: 2024-01-26

## 2024-01-26 NOTE — PROGRESS NOTES
Client called CM.  Client reports got into an argument with his step father and was kicked out of his house.  Client is now homeless and asked CM for assistance with housing referral.  CM provided Client with information on [place] Emergency hotline: 211 and encouraged to call and report housing needs.    CM also provided Client with information on [facility] ([phone]) and encouraged him to call for assistance/referral for emergency housing.    Client agreed to call and inquire.

## 2024-01-29 ENCOUNTER — PATIENT OUTREACH (OUTPATIENT)
Dept: SURGERY | Facility: CLINIC | Age: 44
End: 2024-01-29

## 2024-01-29 ENCOUNTER — APPOINTMENT (OUTPATIENT)
Dept: LAB | Facility: CLINIC | Age: 44
End: 2024-01-29
Payer: COMMERCIAL

## 2024-01-29 ENCOUNTER — TELEPHONE (OUTPATIENT)
Dept: SURGERY | Facility: CLINIC | Age: 44
End: 2024-01-29

## 2024-01-29 DIAGNOSIS — B20 HIV DISEASE (HCC): ICD-10-CM

## 2024-01-29 DIAGNOSIS — Z11.3 ENCOUNTER FOR SCREENING FOR BACTERIAL SEXUALLY TRANSMITTED DISEASE: ICD-10-CM

## 2024-01-29 DIAGNOSIS — Z20.2 CONTACT WITH AND (SUSPECTED) EXPOSURE TO INFECTIONS WITH A PREDOMINANTLY SEXUAL MODE OF TRANSMISSION: ICD-10-CM

## 2024-01-29 DIAGNOSIS — D72.89 OTHER SPECIFIED DISORDERS OF WHITE BLOOD CELLS: ICD-10-CM

## 2024-01-29 DIAGNOSIS — A53.9 SYPHILIS: ICD-10-CM

## 2024-01-29 DIAGNOSIS — Z72.89 OTHER PROBLEMS RELATED TO LIFESTYLE: ICD-10-CM

## 2024-01-29 LAB
ALBUMIN SERPL BCP-MCNC: 4.3 G/DL (ref 3.5–5)
ALP SERPL-CCNC: 76 U/L (ref 34–104)
ALT SERPL W P-5'-P-CCNC: 12 U/L (ref 7–52)
AMORPH URATE CRY URNS QL MICRO: NORMAL
ANION GAP SERPL CALCULATED.3IONS-SCNC: 7 MMOL/L
AST SERPL W P-5'-P-CCNC: 19 U/L (ref 13–39)
BACTERIA UR QL AUTO: NORMAL /HPF
BASOPHILS # BLD AUTO: 0.05 THOUSANDS/ÂΜL (ref 0–0.1)
BASOPHILS NFR BLD AUTO: 1 % (ref 0–1)
BILIRUB SERPL-MCNC: 0.37 MG/DL (ref 0.2–1)
BILIRUB UR QL STRIP: NEGATIVE
BUN SERPL-MCNC: 15 MG/DL (ref 5–25)
CALCIUM SERPL-MCNC: 10.2 MG/DL (ref 8.4–10.2)
CHLORIDE SERPL-SCNC: 104 MMOL/L (ref 96–108)
CHOLEST SERPL-MCNC: 149 MG/DL
CLARITY UR: ABNORMAL
CO2 SERPL-SCNC: 31 MMOL/L (ref 21–32)
COLOR UR: YELLOW
CREAT SERPL-MCNC: 1.02 MG/DL (ref 0.6–1.3)
EOSINOPHIL # BLD AUTO: 0.09 THOUSAND/ÂΜL (ref 0–0.61)
EOSINOPHIL NFR BLD AUTO: 2 % (ref 0–6)
ERYTHROCYTE [DISTWIDTH] IN BLOOD BY AUTOMATED COUNT: 13.4 % (ref 11.6–15.1)
EST. AVERAGE GLUCOSE BLD GHB EST-MCNC: 126 MG/DL
GFR SERPL CREATININE-BSD FRML MDRD: 90 ML/MIN/1.73SQ M
GLUCOSE P FAST SERPL-MCNC: 92 MG/DL (ref 65–99)
GLUCOSE UR STRIP-MCNC: NEGATIVE MG/DL
HBA1C MFR BLD: 6 %
HCT VFR BLD AUTO: 42.3 % (ref 36.5–49.3)
HCV AB SER QL: NORMAL
HDLC SERPL-MCNC: 53 MG/DL
HGB BLD-MCNC: 14.2 G/DL (ref 12–17)
HGB UR QL STRIP.AUTO: NEGATIVE
IMM GRANULOCYTES # BLD AUTO: 0.03 THOUSAND/UL (ref 0–0.2)
IMM GRANULOCYTES NFR BLD AUTO: 1 % (ref 0–2)
KETONES UR STRIP-MCNC: NEGATIVE MG/DL
LDLC SERPL CALC-MCNC: 79 MG/DL (ref 0–100)
LEUKOCYTE ESTERASE UR QL STRIP: NEGATIVE
LYMPHOCYTES # BLD AUTO: 1.07 THOUSANDS/ÂΜL (ref 0.6–4.47)
LYMPHOCYTES NFR BLD AUTO: 18 % (ref 14–44)
MCH RBC QN AUTO: 33.2 PG (ref 26.8–34.3)
MCHC RBC AUTO-ENTMCNC: 33.6 G/DL (ref 31.4–37.4)
MCV RBC AUTO: 99 FL (ref 82–98)
MONOCYTES # BLD AUTO: 0.52 THOUSAND/ÂΜL (ref 0.17–1.22)
MONOCYTES NFR BLD AUTO: 9 % (ref 4–12)
NEUTROPHILS # BLD AUTO: 4.32 THOUSANDS/ÂΜL (ref 1.85–7.62)
NEUTS SEG NFR BLD AUTO: 69 % (ref 43–75)
NITRITE UR QL STRIP: NEGATIVE
NON-SQ EPI CELLS URNS QL MICRO: NORMAL /HPF
NONHDLC SERPL-MCNC: 96 MG/DL
NRBC BLD AUTO-RTO: 0 /100 WBCS
PH UR STRIP.AUTO: 7.5 [PH]
PLATELET # BLD AUTO: 194 THOUSANDS/UL (ref 149–390)
PMV BLD AUTO: 10.1 FL (ref 8.9–12.7)
POTASSIUM SERPL-SCNC: 4.5 MMOL/L (ref 3.5–5.3)
PROT SERPL-MCNC: 7.4 G/DL (ref 6.4–8.4)
PROT UR STRIP-MCNC: ABNORMAL MG/DL
RBC # BLD AUTO: 4.28 MILLION/UL (ref 3.88–5.62)
RBC #/AREA URNS AUTO: NORMAL /HPF
SODIUM SERPL-SCNC: 142 MMOL/L (ref 135–147)
SP GR UR STRIP.AUTO: 1.02 (ref 1–1.03)
TRIGL SERPL-MCNC: 85 MG/DL
UROBILINOGEN UR STRIP-ACNC: <2 MG/DL
WBC # BLD AUTO: 6.08 THOUSAND/UL (ref 4.31–10.16)
WBC #/AREA URNS AUTO: NORMAL /HPF

## 2024-01-29 PROCEDURE — 86780 TREPONEMA PALLIDUM: CPT

## 2024-01-29 PROCEDURE — 86593 SYPHILIS TEST NON-TREP QUANT: CPT

## 2024-01-29 PROCEDURE — 86592 SYPHILIS TEST NON-TREP QUAL: CPT

## 2024-01-30 DIAGNOSIS — E55.9 VITAMIN D DEFICIENCY: ICD-10-CM

## 2024-01-30 DIAGNOSIS — B20 SYMPTOMATIC HIV INFECTION (HCC): ICD-10-CM

## 2024-01-30 LAB
BASOPHILS # BLD AUTO: 0.1 X10E3/UL (ref 0–0.2)
BASOPHILS NFR BLD AUTO: 1 %
C TRACH DNA SPEC QL NAA+PROBE: NEGATIVE
CD3+CD4+ CELLS # BLD: 116 /UL (ref 359–1519)
CD3+CD4+ CELLS NFR BLD: 11.6 % (ref 30.8–58.5)
EOSINOPHIL # BLD AUTO: 0.1 X10E3/UL (ref 0–0.4)
EOSINOPHIL NFR BLD AUTO: 1 %
ERYTHROCYTE [DISTWIDTH] IN BLOOD BY AUTOMATED COUNT: 13.4 % (ref 11.6–15.4)
GAMMA INTERFERON BACKGROUND BLD IA-ACNC: <0 IU/ML
HCT VFR BLD AUTO: 25.6 % (ref 37.5–51)
HGB BLD-MCNC: 8.6 G/DL (ref 13–17.7)
IMM GRANULOCYTES # BLD: 0 X10E3/UL (ref 0–0.1)
IMM GRANULOCYTES NFR BLD: 0 %
LYMPHOCYTES # BLD AUTO: 1 X10E3/UL (ref 0.7–3.1)
LYMPHOCYTES NFR BLD AUTO: 16 %
M TB IFN-G BLD-IMP: NEGATIVE
M TB IFN-G CD4+ BCKGRND COR BLD-ACNC: 0 IU/ML
M TB IFN-G CD4+ BCKGRND COR BLD-ACNC: 0 IU/ML
MCH RBC QN AUTO: 33.2 PG (ref 26.6–33)
MCHC RBC AUTO-ENTMCNC: 33.6 G/DL (ref 31.5–35.7)
MCV RBC AUTO: 99 FL (ref 79–97)
MITOGEN IGNF BCKGRD COR BLD-ACNC: 10 IU/ML
MONOCYTES # BLD AUTO: 0.6 X10E3/UL (ref 0.1–0.9)
MONOCYTES NFR BLD AUTO: 10 %
N GONORRHOEA DNA SPEC QL NAA+PROBE: NEGATIVE
NEUTROPHILS # BLD AUTO: 4.6 X10E3/UL (ref 1.4–7)
NEUTROPHILS NFR BLD AUTO: 72 %
PLATELET # BLD AUTO: 275 X10E3/UL (ref 150–450)
RBC # BLD AUTO: 2.59 X10E6/UL (ref 4.14–5.8)
RPR SER QL: REACTIVE
RPR SER-TITR: ABNORMAL {TITER}
TREPONEMA PALLIDUM IGG+IGM AB [PRESENCE] IN SERUM OR PLASMA BY IMMUNOASSAY: REACTIVE
WBC # BLD AUTO: 6.3 X10E3/UL (ref 3.4–10.8)

## 2024-01-31 ENCOUNTER — TELEPHONE (OUTPATIENT)
Dept: SURGERY | Facility: CLINIC | Age: 44
End: 2024-01-31

## 2024-01-31 LAB — HIV1 RNA # PLAS NAA DL=20: NOT DETECTED {COPIES}/ML

## 2024-01-31 RX ORDER — MULTIVITAMIN WITH FOLIC ACID 400 MCG
1 TABLET ORAL EVERY MORNING
Qty: 90 TABLET | Refills: 1 | Status: SHIPPED | OUTPATIENT
Start: 2024-01-31

## 2024-01-31 NOTE — TELEPHONE ENCOUNTER
LVM for patient to call back. He has an markel today and he hasn't showed up, wanted to know if he was still coming.

## 2024-02-01 ENCOUNTER — PATIENT OUTREACH (OUTPATIENT)
Dept: SURGERY | Facility: CLINIC | Age: 44
End: 2024-02-01

## 2024-02-01 NOTE — PROGRESS NOTES
"CM met with Client.  Client reports receiving letters from [facility] re: medicaid and snap benefits.  Client asked CM for assistance with interpretation/translation of letters.    CM revised letters, informed Client he was being notified by [facility] that he is still eligible for Medicaid, no action required.  Client also being notified about snap semi-annual change report form in which he is required to notify DHS about any changes in household composition and income. No action required since no changes occurred.     OVR referral discussed.  Client reports no recent notifications received.  CM has not received a response from message left to assigned OVR worker Rony Husain ([phone]).    CM emailed OVR Supervisor: Griselda Harley ([email or web address] stating:  \"Hi there. I've been trying to reach out assigned OVR counselor Rony Husain ([phone]) leaving messages with no response. Are you able to assign a new counselor to this Client?   Attached find copy of OVR counselor assignment letter and NICHOLE. Let me know if you have any questions.\"    Response pending.     No other needs reported.  "

## 2024-02-06 ENCOUNTER — TELEPHONE (OUTPATIENT)
Dept: SURGERY | Facility: CLINIC | Age: 44
End: 2024-02-06

## 2024-02-07 ENCOUNTER — OFFICE VISIT (OUTPATIENT)
Dept: SURGERY | Facility: CLINIC | Age: 44
End: 2024-02-07
Payer: COMMERCIAL

## 2024-02-07 VITALS
BODY MASS INDEX: 23.3 KG/M2 | WEIGHT: 187.4 LBS | DIASTOLIC BLOOD PRESSURE: 72 MMHG | TEMPERATURE: 97.5 F | OXYGEN SATURATION: 97 % | SYSTOLIC BLOOD PRESSURE: 119 MMHG | HEART RATE: 88 BPM | HEIGHT: 75 IN | RESPIRATION RATE: 18 BRPM

## 2024-02-07 DIAGNOSIS — D72.810 LYMPHOPENIA: ICD-10-CM

## 2024-02-07 DIAGNOSIS — R41.89 COGNITIVE IMPAIRMENT: ICD-10-CM

## 2024-02-07 DIAGNOSIS — R73.03 PREDIABETES: ICD-10-CM

## 2024-02-07 DIAGNOSIS — E55.9 VITAMIN D DEFICIENCY: ICD-10-CM

## 2024-02-07 DIAGNOSIS — B20 HIV DISEASE (HCC): Primary | ICD-10-CM

## 2024-02-07 DIAGNOSIS — A53.9 SYPHILIS: ICD-10-CM

## 2024-02-07 DIAGNOSIS — K08.9 POOR DENTITION: ICD-10-CM

## 2024-02-07 PROCEDURE — 99214 OFFICE O/P EST MOD 30 MIN: CPT | Performed by: INTERNAL MEDICINE

## 2024-02-07 NOTE — PROGRESS NOTES
Progress Note - Infectious Disease   Millie Lloyd 43 y.o. male MRN: [id number]  Unit/Bed#:  Encounter: [id number]      Impression/Plan:  HIV. Continues to be undetectable on Tivicay and Descovy. CD4 count 116 which is near his previous levels. Continue ART, recheck labs in 5 months, follow-up in 6 months. Stressed adherence to ART.     2. Mental Impairment. Ongoing support services directed by case management.     3. Prediabetes. Recent A1c 6.0, up from 5.7. Continue to monitor and treat with lifestyle modifications.    4. Syphilis. S/p treatment with 3 doses of benzathine pencillin in 2021. Recent RPR positive/reactive to 2 dilutions. Continue to monitor yearly.     5. Poor dentition. Previous abscess in July 2023. Has been trying to get in with a dentist. Encourage dental follow up.     6. Nicotine dependence. Has not had a cigarette for several days, Congratulate patient on quitting and encouraged continue cessation.     7. Depression/Anxiety. Talks to counselor regularly. Is using marijuana as well. Encourage reduction of marijuana as well as cessation.     8. Vitamin D deficiency on supplementation.     9. Trace proteinuria. Continue to monitor.       Patient was provided medication, adherence and prevention education    Subjective:  Routine follow-up for HIV.  Patient claims 100% adherence with Tivicay and Descovy  . Patient denies any notable side effects.  Overall the feeling well.  The patient denies any fever chills or sweats, denies any nausea vomiting or diarrhea, denies any cough or shortness of breath. No recent illnesses. He notes he recently quit smoking cigarettes and has been using nicotine patches in their place as needed.     ROS:  A complete review of systems is negative other than that noted above in the subjective    Followup portions patient history reviewed and updated as:  Allergies, current medications, past medical history, past social history, past surgical history, and the  "problem list    Objective:  Vitals:  Vitals:    02/07/24 1605   BP: 119/72   BP Location: Right arm   Patient Position: Sitting   Cuff Size: Standard   Pulse: 88   Resp: 18   Temp: 97.5 °F (36.4 °C)   TempSrc: Tympanic   SpO2: 97%   Weight: 85 kg (187 lb 6.4 oz)   Height: 6' 3\" (1.905 m)       Physical Exam:   General Appearance:  Alert, interactive, appearing well,  nontoxic, no acute distress.   Neck:   Supple without lymphadenopathy, no thyromegaly or masses   Throat: Oropharynx moist without lesions.    Lungs:   Clear to auscultation bilaterally; no wheezes, rhonchi or rales; respirations unlabored   Heart:  RRR; no murmur, rub or gallop   Abdomen:   Soft, non-tender, non-distended, positive bowel sounds.     Extremities: No clubbing, cyanosis or edema   Skin: No new rashes or lesions. No draining wounds noted.       Labs, Imaging, & Other studies:   All pertinent labs and imaging studies were personally reviewed    Lab Results   Component Value Date    K 4.5 01/29/2024     01/29/2024    CO2 31 01/29/2024    BUN 15 01/29/2024    CREATININE 1.02 01/29/2024    GLUF 92 01/29/2024    CALCIUM 10.2 01/29/2024    AST 19 01/29/2024    ALT 12 01/29/2024    ALKPHOS 76 01/29/2024    EGFR 90 01/29/2024     Lab Results   Component Value Date    WBC 6.08 01/29/2024    WBC 6.3 01/29/2024    HGB 14.2 01/29/2024    HGB 8.6 (L) 01/29/2024    HCT 42.3 01/29/2024    HCT 25.6 (L) 01/29/2024    MCV 99 (H) 01/29/2024    MCV 99 (H) 01/29/2024     01/29/2024     01/29/2024     Lab Results   Component Value Date    HEPCAB Non-reactive 01/29/2024     Lab Results   Component Value Date    HAV Reactive (A) 06/15/2021    HEPCAB Non-reactive 01/29/2024     Lab Results   Component Value Date    RPR Reactive (A) 01/29/2024    RPR Reactive 2 dils (A) 01/29/2024     CD4 ABS   Date/Time Value Ref Range Status   01/29/2024 10:35  (L) 359 - 1519 /uL Final     HIV-1 RNA by PCR, Qn   Date/Time Value Ref Range Status "   04/13/2023 09:15 AM <20 copies/mL Final     Comment:     HIV-1 RNA not detected  The reportable range for this assay is 20 to 10,000,000  copies HIV-1 RNA/mL.     HIV-1 TARGET   Date/Time Value Ref Range Status   01/29/2024 10:35 AM Not Detected Not Detected Final           Current Outpatient Medications:     acetaminophen (TYLENOL) 650 mg CR tablet, Take 1 tablet (650 mg total) by mouth every 8 (eight) hours as needed for mild pain, Disp: 30 tablet, Rfl: 0    albuterol (Ventolin HFA) 90 mcg/act inhaler, Inhale 2 puffs every 6 (six) hours as needed for wheezing, Disp: 18 g, Rfl: 2    chlorhexidine (PERIDEX) 0.12 % solution, Apply 15 mL to the mouth or throat 2 (two) times a day, Disp: 473 mL, Rfl: 2    Cholecalciferol (Vitamin D3) 125 MCG (5000 UT) CAPS, TAKE 1 CAPSULE BY MOUTH DAILY, Disp: 90 capsule, Rfl: 1    dolutegravir (Tivicay) 50 MG TABS, TAKE 1 TABLET BY MOUTH IN THE MORNING, Disp: 90 tablet, Rfl: 1    emtricitabine-tenofovir AF (Descovy) 200-25 MG tablet, TAKE 1 TABLET BY MOUTH IN THE MORNING, Disp: 90 tablet, Rfl: 1    mometasone-formoterol (Dulera) 100-5 MCG/ACT inhaler, Inhale 2 puffs 2 (two) times a day Rinse mouth after use., Disp: 13 g, Rfl: 5    Multiple Vitamin (Tab-A-María) TABS, TAKE 1 TABLET BY MOUTH EVERY MORNING, Disp: 90 tablet, Rfl: 1    nicotine (NICODERM CQ) 7 mg/24hr TD 24 hr patch, Place 1 patch on the skin over 24 hours every 24 hours, Disp: 28 patch, Rfl: 5

## 2024-02-16 ENCOUNTER — PATIENT OUTREACH (OUTPATIENT)
Dept: SURGERY | Facility: CLINIC | Age: 44
End: 2024-02-16

## 2024-02-16 NOTE — PROGRESS NOTES
Client called CM to report received letter from [facility] and asked to meet with this CM to review such letter.    CM and Client agreed to meet next Monday (2/19/2024) at 11 am to review letter.    CM received call from OVR counselor (Rony Husain: [phone]) and discussed need to complete intake with Client.    Counselor also reports in need of medical records showing disability.     CM and OVR counselor agree to conference call next Wednesday (2/21/2024) at 11 AM along with client to complete intake.

## 2024-02-20 ENCOUNTER — PATIENT OUTREACH (OUTPATIENT)
Dept: SURGERY | Facility: CLINIC | Age: 44
End: 2024-02-20

## 2024-02-20 NOTE — PROGRESS NOTES
CM called Client.    CM reminded Client of tomorrow's phone interview appt with OVR counselor at 1 pm.     Client agreed to meet with this CM and conference call to complete OVR intake.    Client also reports received various letter which he will bring tomorrow to review. CM in agreement.

## 2024-02-21 ENCOUNTER — PATIENT OUTREACH (OUTPATIENT)
Dept: SURGERY | Facility: CLINIC | Age: 44
End: 2024-02-21

## 2024-02-21 NOTE — PROGRESS NOTES
CM met with Client.    CM called OVR worker (PAUL Husain: [phone]) along with Client and completed intake over the phone.    Client was provided with information on OVR services, such as:  - Sustained Employment Assistance  - Job Mentorship  - Intermittent Job Support  -   - WEXA program: Work Experience for Adults  - Assistance obtaining 's License  - Neuropsychological Evaluation    CM asked that Client be referred for neuropsychological evaluation, due to cognitive challenges.     Client's medical diagnosis, medication prescribed, incarceration and employment history discussed.    CM asked to provide copy of latest medical note containing medical diagnosis and medications prescribed and sent via fax at [phone].  Once medical information is received and entered into the OVR system, Client will received a notice via mail with new appointment.    Client pleased with referral.

## 2024-02-22 ENCOUNTER — PATIENT OUTREACH (OUTPATIENT)
Dept: SURGERY | Facility: CLINIC | Age: 44
End: 2024-02-22

## 2024-02-23 ENCOUNTER — PATIENT OUTREACH (OUTPATIENT)
Dept: SURGERY | Facility: CLINIC | Age: 44
End: 2024-02-23

## 2024-02-26 ENCOUNTER — PATIENT OUTREACH (OUTPATIENT)
Dept: SURGERY | Facility: CLINIC | Age: 44
End: 2024-02-26

## 2024-02-26 NOTE — PROGRESS NOTES
"CM case conference with OVR worker (PAUL Husain: [phone]).     OVR worker agree to email copy of OVR \"Statement of Customer's Rights and Responsibilities\" form for Client to sing and asked for copy of latest medical note with Client's list of medical diagnosis and medications prescribed.    Moments later, CM received OVR form via email.    CM called Client and asked to meet to discuss OVR referral and sign form.    Client agree to stop by on Monday (2/26/24) at 10:30 am.  "

## 2024-02-26 NOTE — PROGRESS NOTES
"CM met with Client.    CM signed OVR's \"statement of customers rights and responsibilities\" form.    Client also brought in DHS letter received for review.    CM reviewed letter and informed Client he received Notice of Hearing Date/Time letter informing of schedule snap hearing for March 4th, between 1 PM to 4  PM via phone with  Pina Zhu.     CM assisted Client complete appellant's reply to the bureau of hearings and appeals agreeing to scheduled hearing.   Client signed form.  CM mailed form to:  [facility] [employer]  [address] [place] [address].    Housing needs discussed.  CM informed Client that [facility]'s ([facility]) wait list for housing application is temporarily open and encouraged Client to visit their office and request an application.  Client encouraged to meet with this CM once application is obtained for assistance in completing it.    CM provided Client with [facility]'s address:  [address] [place] [address], [address] [place] [address]    Client reports aware of location and able to get there.    CM also mailed OVR form and needed supportive documents to:  [facility]  [facility]  [facility] Office   [address]  [place] [address]  "

## 2024-02-26 NOTE — PROGRESS NOTES
CM met with Client.    Client brought letters received for review.    CM reviewed letters received from [facility] and [facility] office.  CM informed Client of [facility] notice informing him that he continues to qualify for Medicaid. No action required.    CM also informed Client of [facility] letter received informing him that he qualifies for Medicare Part-B and encouraged to complete and mail enrollment form.   CM explained to Client that this form is required for people who have Medicare Part-A in place and since he doesn't have it he is not required to submit it.   Client verbalized understanding.    SSI appeal case discussed.  Client asked CM for assistance contact local SSA office to follow up with case status.  CM and Client called local SSA office and spoke to worker.  Client was informed that since his disability income is more than $943, he does not qualify for SSI.   Client attempted to dispute issue and was encouraged to wait for response re: SSI appeal to make his case.  Client in agreement.  No other needs reported.

## 2024-02-27 ENCOUNTER — PATIENT OUTREACH (OUTPATIENT)
Dept: SURGERY | Facility: CLINIC | Age: 44
End: 2024-02-27

## 2024-02-27 NOTE — PROGRESS NOTES
CM case conference with OVR worker (PAUL Husain) re: Referral status.    CM informed OVR worker of documents mail and emailed copy of signed NICHOLE, OVR forms, Client ID/Insurance Card and SSDI Statement of benefits.    OVR worker agree to reach out to this CM once original forms are received via mail.

## 2024-03-07 ENCOUNTER — PATIENT OUTREACH (OUTPATIENT)
Dept: SURGERY | Facility: CLINIC | Age: 44
End: 2024-03-07

## 2024-03-08 NOTE — PROGRESS NOTES
CM called Client.  OVR referral discussed.    Client reports has not heard from OVR counselor.    Client also reports visited [facility] to request an appointment but was told they are not accepting new application and wait list is closed.    Client reports receiving letter from [facility], agree to drop off letter for this CM to review.   CM in agreement.

## 2024-03-21 ENCOUNTER — PATIENT OUTREACH (OUTPATIENT)
Dept: SURGERY | Facility: CLINIC | Age: 44
End: 2024-03-21

## 2024-03-22 NOTE — PROGRESS NOTES
Client called CM.    Client reports tooth ache and interest in finding employment through OVR services.   Client explained he has not heard from OVR counselor.    CM encouraged Client to visit [facility] located on [address] early tomorrow morning to be seen for dental emergency.    CM also encouraged Client to contact OVR counselor and inquire about employment assistance services. Client agreed.    Moments later, Client called CM and reported spoke to OVR counselor who explained that his case is under review for employment assistance services and will notify when once is approved.     Client agree to visit dental clinic and report outcome.

## 2024-03-26 ENCOUNTER — PATIENT OUTREACH (OUTPATIENT)
Dept: SURGERY | Facility: CLINIC | Age: 44
End: 2024-03-26

## 2024-03-28 ENCOUNTER — PATIENT OUTREACH (OUTPATIENT)
Dept: SURGERY | Facility: CLINIC | Age: 44
End: 2024-03-28

## 2024-03-29 NOTE — PROGRESS NOTES
Client called CM.  Client reports spoke to OVR counselor and was asked to meet him in person at this office to further discuss employment assistance program.    Client reports not being able to get to OVR program location due to cognitive barriers to using public transportation and asked CM for assistance.    CM agree to contact OVR worker, obtain his office location and coordinate lyft.    CM called OVR worker but was not available. Message left asking for call back.

## 2024-04-03 ENCOUNTER — PATIENT OUTREACH (OUTPATIENT)
Dept: SURGERY | Facility: CLINIC | Age: 44
End: 2024-04-03

## 2024-04-03 NOTE — PROGRESS NOTES
Client called CM.  Client expressed concern re: difficulty finding employment and asked CM for assistance contacting OVR counselor to coordinate visit to meet him.    CM called OVR worker (NIKUNJQuinn Husain: [phone]) but was no available.  CM left message informing of Client's interest in meeting him to work on employment assistance and asked for date/time for Client to visit him.    CM updated Client.  CM agree to assist Client with coordination of LYFT once OVR response with a date/time to meet.

## 2024-04-05 ENCOUNTER — PATIENT OUTREACH (OUTPATIENT)
Dept: SURGERY | Facility: CLINIC | Age: 44
End: 2024-04-05

## 2024-04-05 NOTE — PROGRESS NOTES
CM called OVR worker.  CM and worker discussed Client's interest in pre/vocational services and employment assistance.    OVR reports needs to meet with Client in the office to further discuss need and work on plan.    CM agree to assist Client with coordination of transportation to his office and asked for a conference call with Client to discuss such visit.    Case conference with OVR worker scheduled for Wednesday, April 10, at 1 PM.    CM called Client and provided update.  Client agree to meet this CM on such date for conference call.

## 2024-04-08 ENCOUNTER — PATIENT OUTREACH (OUTPATIENT)
Dept: SURGERY | Facility: CLINIC | Age: 44
End: 2024-04-08

## 2024-04-09 ENCOUNTER — PATIENT OUTREACH (OUTPATIENT)
Dept: SURGERY | Facility: CLINIC | Age: 44
End: 2024-04-09

## 2024-04-09 NOTE — PROGRESS NOTES
CM called Client to remind of OVR conference call scheduled for this Wednesday (4/10/24) at 1 PM.    Client not available and message left asking for call back.

## 2024-04-09 NOTE — PROGRESS NOTES
CM called Client to remind of OVR conference call scheduled for tomorrow (4/10/24) at 1 PM.    Client not available and message left asking for call back.

## 2024-04-10 ENCOUNTER — PATIENT OUTREACH (OUTPATIENT)
Dept: SURGERY | Facility: CLINIC | Age: 44
End: 2024-04-10

## 2024-04-11 ENCOUNTER — PATIENT OUTREACH (OUTPATIENT)
Dept: SURGERY | Facility: CLINIC | Age: 44
End: 2024-04-11

## 2024-04-11 NOTE — PROGRESS NOTES
CM case conference with OVR worker re: referral.  CM received copy of OVR forms for Client to sign as part of WEXA program admission.    CM met with Client.  Client singed OVR forms.  CM emailed copy of signed form to OVR worker.    Client expressed interest in signing up for [facility] VIP program since he was approved for Medicare.  VIP program provides benefits such $150 per month in food and $350 annually to help pay for utility bills.    CM called [facility] and referred Client for VIP program.  Client completed intake but was later told he does not qualify.     Housing needs discussed.  CM provided Client with copy of [facility] [employer] [facility] What's Trending Application and asked to provide the following documents needed:  - Bank Statement  - SSI Award letter  - State ID  - SS Card    Client agree to look for such docs and provided them to this CM.

## 2024-04-11 NOTE — PROGRESS NOTES
CM met with Client.  CM and Client case conference with OVR worker (PAUL Husain) as previously agreed.  Client's employment, education and incarceration history discussed.    Client was provided with an explanation of OVR services including WEXA, employment counseling, case management and benefits counseling services.    Client expressed interest in WEXA (work experience for adults program).    OVR agree to email copy of forms needed for Client to sign in for admission into WEXA program.     Dental needs discussed.  Client continues to endorse dental pain/aches, will visit [facility] to schedule dental appointment.   No other needs discussed.

## 2024-04-12 ENCOUNTER — PATIENT OUTREACH (OUTPATIENT)
Dept: SURGERY | Facility: CLINIC | Age: 44
End: 2024-04-12

## 2024-04-15 ENCOUNTER — PATIENT OUTREACH (OUTPATIENT)
Dept: SURGERY | Facility: CLINIC | Age: 44
End: 2024-04-15

## 2024-04-15 NOTE — PROGRESS NOTES
CM case conference with PCP (ANNA Bullock) re: medical marijuana referral process.    PCP explained [facility] does not sponsor medical marijuana referrals and this is something Client has to do on his own.    CM called Client and discussed medical marijuana referral.  CM assisted Client in creating medical marijuana account.  CM searched for provider on the medical marijuana website.  CM called Neurologist (Augusto Lopez MD: [phone]), spoke to reception who reports such provider no longer works there.     CM called Client and provided update.    CM mailed copy of April Wellness Newsletter to Client.

## 2024-04-16 NOTE — PROGRESS NOTES
Client called CM.  Client reports receiving letter from [facility] office and asked CM for assistance reviewing letter.  CM encouraged Client to bring letter.  Client dropped off letter in this CM's mailbox.    CM reviewed letter and informed Client that [facility] letter was about information regarding employment assistance services available.    CM informed Client that such services is no longer needed because he is now connected to OVR program.    Client verbalized understanding.    Dental needs discussed.  Client reports has not visited [facility] but will do so this week and report outcome.  No other needs reported.

## 2024-04-17 ENCOUNTER — PATIENT OUTREACH (OUTPATIENT)
Dept: SURGERY | Facility: CLINIC | Age: 44
End: 2024-04-17

## 2024-04-17 NOTE — PROGRESS NOTES
Client called CM.  Client expressed concern over need for med refills.  Client asked CM for [facility] contact info to follow up with refill needs.  CM provided Client with [facility] Specialty Care Representative (Bonifacio Reagan: [phone]) and encouraged to call.    Client asked CM for assistance obtaining Medical Marijuana Card.  CM attempted to assist Client several times login into Medical Marijuana website but received error message.    CM called [facility] Medical Marijuana Helpdesk ([phone]) and assisted Client addressing log in issue.    [facility] worker agree to send email to Client in order to reset account, once logged in, Client will be able to complete registration and request card.  Client does not need to pay $50 fee for card since he is a snap recipient.  Client agree to contact  once email is received.  Client agree to contact [facility] and report outcome.    CM called [facility] program ([phone]) to request appt for medical marijuana evaluation.  CM spoke to reception who transferred call to Psych department (ext. [phone]) but there was no response.  CM left message asking for call back.

## 2024-04-18 DIAGNOSIS — K08.9 POOR DENTITION: ICD-10-CM

## 2024-04-18 DIAGNOSIS — K06.1 GUM HYPERPLASIA: ICD-10-CM

## 2024-04-18 RX ORDER — CHLORHEXIDINE GLUCONATE ORAL RINSE 1.2 MG/ML
15 SOLUTION DENTAL 2 TIMES DAILY
Qty: 473 ML | Refills: 2 | Status: SHIPPED | OUTPATIENT
Start: 2024-04-18

## 2024-04-19 ENCOUNTER — PATIENT OUTREACH (OUTPATIENT)
Dept: SURGERY | Facility: CLINIC | Age: 44
End: 2024-04-19

## 2024-04-22 ENCOUNTER — PATIENT OUTREACH (OUTPATIENT)
Dept: SURGERY | Facility: CLINIC | Age: 44
End: 2024-04-22

## 2024-04-23 NOTE — PROGRESS NOTES
Client called CM.  Client inquired as to documents needed for housing application.  CM reminded Client of the need for the following docs:  - Bank Statement  - SSI Award Letter  - Birth Certificate    Client agree to visit SSA office today and bring docs to this CM.    Client's interest in employment and obtaining 's licence discussed.  CM encouraged Client to contact OVR worker to follow up with employment assistance program and assistance provided towards obtaining driving license.   Client agree to do so.    Moments later, CM and Client met during office visit.  Client provided this CM with copy of support docs for housing application.  CM assisted Client complete [facility] housing application.  CM attempted to fax application/support docs but not working.  CM called  Erikhillary Brockoney  ([place]: [phone]) and discussed Client's interest in submitting application.  CM encouraged to email application/support docs [email or web address].  CM emailed forms and confirmation received.    CM called Client and provided update on housing referral.

## 2024-04-24 DIAGNOSIS — B20 SYMPTOMATIC HIV INFECTION (HCC): ICD-10-CM

## 2024-04-24 DIAGNOSIS — B20 AIDS (ACQUIRED IMMUNE DEFICIENCY SYNDROME) (HCC): ICD-10-CM

## 2024-04-24 RX ORDER — EMTRICITABINE AND TENOFOVIR ALAFENAMIDE 200; 25 MG/1; MG/1
1 TABLET ORAL EVERY MORNING
Qty: 90 TABLET | Refills: 1 | Status: SHIPPED | OUTPATIENT
Start: 2024-04-24

## 2024-04-24 RX ORDER — DOLUTEGRAVIR SODIUM 50 MG/1
50 TABLET, FILM COATED ORAL EVERY MORNING
Qty: 90 TABLET | Refills: 1 | Status: SHIPPED | OUTPATIENT
Start: 2024-04-24

## 2024-04-26 NOTE — PROGRESS NOTES
Client called CM.  Client reports has not received any information re: outcome of medical marijuana program since he created online account.    CM explained to Client steps needed to obtain medical marijuana card.  First, client need to register online, which this CM already assisted with.    Second, Client needs to be evaluated by a [place] certified medical practitioner. Client reports not aware of practitioner at [facility].  CM case conference with PCP.  PCP reports [facility] does not provide medical evaluations for medical marijuana.  CM encouraged to visit [place] medical [place] for information on available physicians.  CM provided Client with update, agree to assist.

## 2024-04-29 ENCOUNTER — PATIENT OUTREACH (OUTPATIENT)
Dept: SURGERY | Facility: CLINIC | Age: 44
End: 2024-04-29

## 2024-04-30 ENCOUNTER — TREATMENT (OUTPATIENT)
Dept: SURGERY | Facility: CLINIC | Age: 44
End: 2024-04-30

## 2024-04-30 ENCOUNTER — PATIENT OUTREACH (OUTPATIENT)
Dept: SURGERY | Facility: CLINIC | Age: 44
End: 2024-04-30

## 2024-04-30 ENCOUNTER — DOCUMENTATION (OUTPATIENT)
Dept: SURGERY | Facility: CLINIC | Age: 44
End: 2024-04-30

## 2024-04-30 ENCOUNTER — OFFICE VISIT (OUTPATIENT)
Dept: SURGERY | Facility: CLINIC | Age: 44
End: 2024-04-30
Payer: COMMERCIAL

## 2024-04-30 VITALS
RESPIRATION RATE: 17 BRPM | TEMPERATURE: 97 F | HEART RATE: 61 BPM | DIASTOLIC BLOOD PRESSURE: 59 MMHG | SYSTOLIC BLOOD PRESSURE: 120 MMHG | WEIGHT: 183 LBS | OXYGEN SATURATION: 98 % | HEIGHT: 75 IN | BODY MASS INDEX: 22.75 KG/M2

## 2024-04-30 DIAGNOSIS — K04.6 PERIAPICAL ABSCESS OF TOOTH WITH FISTULA: ICD-10-CM

## 2024-04-30 DIAGNOSIS — F33.2 SEVERE EPISODE OF RECURRENT MAJOR DEPRESSIVE DISORDER, WITHOUT PSYCHOTIC FEATURES (HCC): ICD-10-CM

## 2024-04-30 DIAGNOSIS — F33.1 MODERATE EPISODE OF RECURRENT MAJOR DEPRESSIVE DISORDER (HCC): Primary | ICD-10-CM

## 2024-04-30 DIAGNOSIS — K04.7 PERIAPICAL ABSCESS WITH FACIAL INVOLVEMENT: ICD-10-CM

## 2024-04-30 DIAGNOSIS — K03.2 EROSION OF TEETH: ICD-10-CM

## 2024-04-30 DIAGNOSIS — K06.8 PAIN IN GUMS: ICD-10-CM

## 2024-04-30 DIAGNOSIS — Z12.5 SCREENING FOR PROSTATE CANCER: ICD-10-CM

## 2024-04-30 DIAGNOSIS — R73.03 PREDIABETES: ICD-10-CM

## 2024-04-30 DIAGNOSIS — K04.7 DENTAL ABSCESS: ICD-10-CM

## 2024-04-30 DIAGNOSIS — R41.89 COGNITIVE IMPAIRMENT: ICD-10-CM

## 2024-04-30 DIAGNOSIS — F41.9 ANXIETY: ICD-10-CM

## 2024-04-30 DIAGNOSIS — R93.89 ABNORMAL CT SCAN: ICD-10-CM

## 2024-04-30 DIAGNOSIS — K08.9 POOR DENTITION: ICD-10-CM

## 2024-04-30 DIAGNOSIS — F12.10 MARIJUANA ABUSE: ICD-10-CM

## 2024-04-30 DIAGNOSIS — B20 CURRENTLY ASYMPTOMATIC HIV INFECTION, WITH HISTORY OF HIV-RELATED ILLNESS (HCC): Primary | ICD-10-CM

## 2024-04-30 DIAGNOSIS — R80.8 OTHER PROTEINURIA: ICD-10-CM

## 2024-04-30 DIAGNOSIS — F40.298 FEAR OF DEATH: ICD-10-CM

## 2024-04-30 DIAGNOSIS — Z00.00 ANNUAL PHYSICAL EXAM: ICD-10-CM

## 2024-04-30 PROCEDURE — 99396 PREV VISIT EST AGE 40-64: CPT | Performed by: NURSE PRACTITIONER

## 2024-04-30 RX ORDER — AMOXICILLIN AND CLAVULANATE POTASSIUM 875; 125 MG/1; MG/1
1 TABLET, FILM COATED ORAL EVERY 12 HOURS SCHEDULED
Qty: 20 TABLET | Refills: 0 | Status: SHIPPED | OUTPATIENT
Start: 2024-04-30 | End: 2024-05-10

## 2024-04-30 RX ORDER — AMOXICILLIN AND CLAVULANATE POTASSIUM 875; 125 MG/1; MG/1
1 TABLET, FILM COATED ORAL EVERY 12 HOURS SCHEDULED
Qty: 20 TABLET | Refills: 0 | Status: SHIPPED | OUTPATIENT
Start: 2024-04-30 | End: 2024-04-30

## 2024-04-30 NOTE — ASSESSMENT & PLAN NOTE
New dx.  Most likely in the setting of HIV or prediabetes.  BP at goal  A1C: 6.0  Recheck UA if persistent will send to Nephrology

## 2024-04-30 NOTE — ASSESSMENT & PLAN NOTE
Left upper gum: area is indented with erythema and painful to palpation .  Unclear if pt ha tunneling into the gum line.    No puss noted but area was swollen.  CT facial 7/23:  IMPRESSION:     Moderate size radicular cyst/periapical abscesses associated with the right first and second molar teeth with erosion of the buccal cortex and mild adjacent perigingival/perimandibular soft tissue thickening. No discrete fluid collection or abscess is   identified at this time. Follow-up with the oromaxillofacial surgical service is recommended.  Has not followed up with oral surgery.  CT facial to rule out abscess/tunneling/infection or inflammation  Augmentin q 12 hours X 10 days  Referral to Oral surgeon  Plan for CM to accompany pt to [facility]

## 2024-04-30 NOTE — PROGRESS NOTES
Assessment/Plan: Pt was approached by S after completing session with assigned PCP to explore multidimensional stability. Pt's assigned PCP developed a brief case consult to address his MH's instability per pt's disclosure. PCP assessed the (PHQ-9=20) displaying moderately severe depressive traits with passive SI. During today's contact, pt disclosed experiencing extended vaults of depressive traits associated to his stepfather mae's instability were allegedly he suffered from a brain aneurism recently, along with being concerned of his mother's financial welfare. Pt described past passive SI were he consider self-harm by cutting due to experiencing helplessness, and hopelessness at the time.     Pt's emotions and cognitions were validated throughout contact, along with receiving positive reinforcements for his willingness to address his MH's instability. A brief psycho-educational conversation was developed, addressing the concepts of helplessness, and hopelessness as triggers of situational depressive traits, along with reviewing assertive and healthy coping sources towards harm reduction from cannabis use. Pt was therapeutically challenged to consider being referred to co-occurrent services which he agreed to contact his mother's MH provider to receive assistance. At the end of contact, pt was encouraged to benefit from ongoing  services which he agreed to consider. No SI or HI were disclosed nor displayed throughout contact.     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        [facility]     Today patient present with No chief complaint on file.    Patient would likely benefit from: Co-occurrent Tx with psychotropic MM and intense psychotherapeutic interventions; Smoking cessation sources and counseling.   Consider/focus/continue: Monitoring pt's  emotional, cognitive, and behavioral health's stability.   Stage of change: Pre-contemplation  Plan/ Behavioral Recommendations: Ongoing  interventions per pt's coordinated care, and/or pt's request of services.       There are no diagnoses linked to this encounter.      Discussion:     Patient can reach out to [facility] if they experiences changes in their behavioral health.    Subjective:     Patient ID: Millie Lloyd is a 43 y.o. male.    HPI    History of Present Illness:     The patient is seeing the [facility] today for a routine behavioral health follow up.    Review of Systems      Objective:     Physical Exam      [facility]    Orientation     Person: yes    Place: yes    Time: yes    Appearance    Well Developed: yes healthy    Uncomfortable: no    Normal Body Odor: yes    Smells of Feces: no    Smells of Urine: no    Disheveled: no    Well Nourished: yes weight WNL of ideal    Grooming Unkempt: no    Poor Eye Contact: no    Hirsute: yes    Looks Tired: yes    Acutely Exhausted: noappears older    Mood and Affect:     Appropriate: yes    Euthymicyes    Irritable: no    Angry: no    Anxious: no    Depressed:yes    Blunted:no    Labile: no    Restricted: no    Harm to Self or Others: Passive SI without ideas of reference, nor a plan of execution reflected on PHQ-9.     Substance Abuse: Tobacco Use Disorder, Severe; Cannabis Use Disorder, Severe.

## 2024-04-30 NOTE — ASSESSMENT & PLAN NOTE
Doing well on Descovy and Tivicay with an undetectable VL.  Pt reports 100% medication compliance on HAART.  Stressed the importance of 100% medication adherence  Monitor CD4. HIV RNA, CMP, and CBCD for virologic response and drug toxicities  Follow up with Dr. Parkinson or Dr. Billy   HIV Counseling:    Viral Load: not detected     CD4 Count: 116      Denies side effects.  Stressed the importance of adherence.  Continue follow up in the ID clinic with Dr. Parkinson or Dr. Billy.    Reviewed the most recent labs, including the CD4 and viral load.  Discussed the risks and benefits of treatment options, instructions for management, importance of treatment adherence, and reduction of risk factors.  Educated on possible medication side effects.    Counseled on routes of HIV transmission, including the risk of  infection.  Emphasized that viral suppression is the best method to prevent HIV transmission.  At this time the pt denies the need for HIV testing of anyone in their life.    Total encounter time was greater than 35 minutes.  Greater than 20 minutes were spent on counseling and patient education.  Pt voices understanding and agreement with treatment plan.

## 2024-04-30 NOTE — ASSESSMENT & PLAN NOTE
He has a lot of fear and does not have any to rely on other than his mother.    He is awaiting placement in  program.  Work with Evon from [facility]  Plan to have someone accompany him to some of his appointment

## 2024-04-30 NOTE — PROGRESS NOTES
ADULT ANNUAL PHYSICAL  [facility] - [facility] AT [facility]    NAME: Millie Lloyd  AGE: 43 y.o. SEX: male  : 1980     DATE: 2024     Assessment and Plan:     Problem List Items Addressed This Visit          Digestive    Poor dentition    Dental abscess     Left upper gum: area is indented with erythema and painful to palpation .  Unclear if pt ha tunneling into the gum line.    No puss noted but area was swollen.  CT facial :  IMPRESSION:     Moderate size radicular cyst/periapical abscesses associated with the right first and second molar teeth with erosion of the buccal cortex and mild adjacent perigingival/perimandibular soft tissue thickening. No discrete fluid collection or abscess is   identified at this time. Follow-up with the oromaxillofacial surgical service is recommended.  Has not followed up with oral surgery.  CT facial to rule out abscess/tunneling/infection or inflammation  Augmentin q 12 hours X 10 days  Referral to Oral surgeon  Plan for CM to accompany pt to [facility]    Severe episode of recurrent major depressive disorder, without psychotic features (HCC)     PHQ-9: 20.  Has tried Wellbutrin in past but did not like the way it made him feel.  He is willing to try something else.  Follow with [facility]  F/u in 1 month  Discussed plan about going to ER if he feels like harming himself  No SI or HI at this time  Sertraline 50 mg QD  Pt call office with any side effects          Fear of death     He has a lot of fear and does not have any to rely on other than his mother.    He is awaiting placement in  program.  Work with vEon from [facility]  Plan to have someone accompany him to some of his appointment         Marijuana abuse     Continues to smoke daily.  Recommend cessation of marijuana use            Neurology/Sleep    Cognitive impairment       Other    Currently asymptomatic HIV infection, with history of HIV-related  illness (HCC) - Primary     Doing well on Descovy and Tivicay with an undetectable VL.  Pt reports 100% medication compliance on HAART.  Stressed the importance of 100% medication adherence  Monitor CD4. HIV RNA, CMP, and CBCD for virologic response and drug toxicities  Follow up with Dr. Parkinson or Dr. Billy   HIV Counseling:    Viral Load: not detected     CD4 Count: 116      Denies side effects.  Stressed the importance of adherence.  Continue follow up in the ID clinic with Dr. Parkinson or Dr. Billy.    Reviewed the most recent labs, including the CD4 and viral load.  Discussed the risks and benefits of treatment options, instructions for management, importance of treatment adherence, and reduction of risk factors.  Educated on possible medication side effects.    Counseled on routes of HIV transmission, including the risk of  infection.  Emphasized that viral suppression is the best method to prevent HIV transmission.  At this time the pt denies the need for HIV testing of anyone in their life.    Total encounter time was greater than 35 minutes.  Greater than 20 minutes were spent on counseling and patient education.  Pt voices understanding and agreement with treatment plan.             Prediabetes     Elevated to 6.0 from 5.6.  Discussed starting Jardiance 10 mg QD with food   Recheck A1C 6 months         Relevant Medications    Empagliflozin (Jardiance) 10 MG TABS tablet    Other Relevant Orders    Hemoglobin A1C    Other proteinuria     New dx.  Most likely in the setting of HIV or prediabetes.  BP at goal  A1C: 6.0  Recheck UA if persistent will send to Nephrology         Relevant Orders    UA w Reflex to Microscopic w Reflex to Culture     Other Visit Diagnoses       Annual physical exam        Screening for prostate cancer        Relevant Orders    PSA Total (Reflex To Free)    Periapical abscess of tooth with fistula        Abnormal CT scan        Erosion of teeth        Periapical abscess with  facial involvement        Pain in gums                Immunizations and preventive care screenings were discussed with patient today. Appropriate education was printed on patient's after visit summary.    Discussed risks and benefits of prostate cancer screening. We discussed the controversial history of PSA screening for prostate cancer in the [place] as well as the risk of over detection and over treatment of prostate cancer by way of PSA screening.  The patient understands that PSA blood testing is an imperfect way to screen for prostate cancer and that elevated PSA levels in the blood may also be caused by infection, inflammation, prostatic trauma or manipulation, urological procedures, or by benign prostatic enlargement.    The role of the digital rectal examination in prostate cancer screening was also discussed and I discussed with him that there is large interobserver variability in the findings of digital rectal examination.    Counseling:  Alcohol/drug use: discussed moderation in alcohol intake, the recommendations for healthy alcohol use, and avoidance of illicit drug use.  Dental Health: discussed importance of regular tooth brushing, flossing, and dental visits.  Injury prevention: discussed safety/seat belts, safety helmets, smoke detectors, carbon dioxide detectors, and smoking near bedding or upholstery.  Sexual health: discussed sexually transmitted diseases, partner selection, use of condoms, avoidance of unintended pregnancy, and contraceptive alternatives.  Exercise: the importance of regular exercise/physical activity was discussed. Recommend exercise 3-5 times per week for at least 30 minutes.          Return in 1 month (on 5/30/2024).     Chief Complaint:   Millie presents for annual physical and for management of HIV.  PMH: HIV, marijuana use, depression, anxiety, PTSD, lymphopenia, cognitive impairment, vitamin d deficiency, prediabetes, proteinuria, and smoking. Millie reports he  has been ok but is having pain in his left upper gum line and feels a lump.  Area is very painful and has difficulty eating. He does have a poor appetite but smoke marijuana daily. He does not sleep at night since his mind is racing.  He has many fears and feels isolated and lonely.      He admitted to have a plan to harm himself a few days ago when he was feeling very low but does not have one now and did not act on his plan.  He work through his depression on his own.     He does not endorse any fever, chills, nausea, vomiting, cough, SOB, diarrhea, or night sweats.    Chief Complaint   Patient presents with    Annual Exam      History of Present Illness:     Adult Annual Physical   Patient here for a comprehensive physical exam. The patient reports problems - left upper gum he has lump .    Diet and Physical Activity  Diet/Nutrition: portion control, limited junk food, and limited fruits/vegetables.   Exercise: no formal exercise.      Depression Screening  PHQ-2/9 Depression Screening    Little interest or pleasure in doing things: 2 - more than half the days  Feeling down, depressed, or hopeless: 3 - nearly every day  Trouble falling or staying asleep, or sleeping too much: 3 - nearly every day  Feeling tired or having little energy: 3 - nearly every day  Poor appetite or overeating: 3 - nearly every day  Feeling bad about yourself - or that you are a failure or have let yourself or your family down: 3 - nearly every day  Trouble concentrating on things, such as reading the newspaper or watching television: 1 - several days  Moving or speaking so slowly that other people could have noticed. Or the opposite - being so fidgety or restless that you have been moving around a lot more than usual: 1 - several days  Thoughts that you would be better off dead, or of hurting yourself in some way: 1 - several days  PHQ-9 Score: 20  PHQ-9 Interpretation: Severe depression       General Health  Sleep: sleeps poorly, gets  4-6 hours of sleep on average, and stays awake all night .   Hearing: normal - bilateral.  Vision: vision problems: hard to see has not been to eye doctor in a while .   Dental: no dental visits for >1 year and brushes teeth once daily.        Health  Symptoms include: urinary frequency    Advanced Care Planning  Do you have an advanced directive? no  Do you have a durable medical power of ? no  ACP document given to patient? no     Review of Systems:     Review of Systems   Constitutional: Negative.    HENT:  Positive for dental problem.    Respiratory: Negative.     Cardiovascular: Negative.    Gastrointestinal: Negative.    Genitourinary: Negative.    Musculoskeletal: Negative.    Neurological: Negative.    Psychiatric/Behavioral: Negative.        Past Medical History:     No past medical history on file.   Past Surgical History:     No past surgical history on file.   Family History:     Family History   Problem Relation Age of Onset    Depression Mother       Social History:     Social History     Socioeconomic History    Marital status: Single     Spouse name: None    Number of children: None    Years of education: None    Highest education level: None   Occupational History    None   Tobacco Use    Smoking status: Heavy Smoker     Current packs/day: 1.00     Average packs/day: 0.5 packs/day for 14.0 years (7.0 ttl pk-yrs)     Types: Cigarettes     Start date: 4/23/2024    Smokeless tobacco: Never   Substance and Sexual Activity    Alcohol use: Not Currently     Alcohol/week: 2.0 standard drinks of alcohol     Types: 2 Cans of beer per week    Drug use: Yes     Frequency: 7.0 times per week     Types: Marijuana     Comment: 1 daily    Sexual activity: Not Currently     Partners: Female     Birth control/protection: Condom   Other Topics Concern    None   Social History Narrative    None     Social Determinants of Health     Financial Resource Strain: Not on file   Food Insecurity: Food Insecurity  Present (4/30/2024)    Hunger Vital Sign     Worried About Running Out of Food in the Last Year: Sometimes true     Ran Out of Food in the Last Year: Sometimes true   Transportation Needs: Not on file   Physical Activity: Not on file   Stress: Not on file   Social Connections: Not on file   Intimate Partner Violence: Not on file   Housing Stability: Not on file      Current Medications:     Current Outpatient Medications   Medication Sig Dispense Refill    albuterol (Ventolin HFA) 90 mcg/act inhaler Inhale 2 puffs every 6 (six) hours as needed for wheezing 18 g 2    chlorhexidine (PERIDEX) 0.12 % solution Apply 15 mL to the mouth or throat 2 (two) times a day 473 mL 2    Cholecalciferol (Vitamin D3) 125 MCG (5000 UT) CAPS TAKE 1 CAPSULE BY MOUTH DAILY 90 capsule 1    dolutegravir (Tivicay) 50 MG TABS TAKE 1 TABLET BY MOUTH IN THE MORNING 90 tablet 1    Empagliflozin (Jardiance) 10 MG TABS tablet Take 1 tablet (10 mg total) by mouth every morning 90 tablet 0    emtricitabine-tenofovir AF (Descovy) 200-25 MG tablet TAKE 1 TABLET BY MOUTH IN THE MORNING 90 tablet 1    mometasone-formoterol (Dulera) 100-5 MCG/ACT inhaler Inhale 2 puffs 2 (two) times a day Rinse mouth after use. 13 g 5    Multiple Vitamin (Tab-A-María) TABS TAKE 1 TABLET BY MOUTH EVERY MORNING 90 tablet 1    nicotine (NICODERM CQ) 7 mg/24hr TD 24 hr patch Place 1 patch on the skin over 24 hours every 24 hours 28 patch 5    acetaminophen (TYLENOL) 650 mg CR tablet Take 1 tablet (650 mg total) by mouth every 8 (eight) hours as needed for mild pain (Patient not taking: Reported on 4/30/2024) 30 tablet 0     No current facility-administered medications for this visit.      Allergies:     Allergies   Allergen Reactions    Nicotine Rash    Ra Nicotine Gum [Nicotine Polacrilex] Rash     Pt is also allergic to nicotine patches.       Physical Exam:     /59 (BP Location: Right arm, Patient Position: Sitting, Cuff Size: Large)   Pulse 61   Temp (!) 97 °F  "(36.1 °C) (Tympanic)   Resp 17   Ht 6' 3\" (1.905 m)   Wt 83 kg (183 lb)   SpO2 98%   BMI 22.87 kg/m²     Physical Exam  Vitals and nursing note reviewed.   Constitutional:       General: He is not in acute distress.     Appearance: Normal appearance. He is not ill-appearing.   HENT:      Head: Normocephalic.      Right Ear: There is impacted cerumen.      Left Ear: There is impacted cerumen.      Nose: Nose normal.      Mouth/Throat:      Mouth: Mucous membranes are moist.      Dentition: Abnormal dentition. Dental tenderness and dental abscesses present.      Pharynx: Oropharynx is clear.     Eyes:      Extraocular Movements: Extraocular movements intact.      Pupils: Pupils are equal, round, and reactive to light.   Cardiovascular:      Rate and Rhythm: Normal rate and regular rhythm.      Pulses: Normal pulses.      Heart sounds: Normal heart sounds.   Pulmonary:      Effort: Pulmonary effort is normal.      Breath sounds: Normal breath sounds.   Abdominal:      General: Bowel sounds are normal.      Palpations: Abdomen is soft.   Musculoskeletal:         General: Normal range of motion.   Lymphadenopathy:      Cervical: No cervical adenopathy.   Skin:     General: Skin is warm and dry.      Capillary Refill: Capillary refill takes less than 2 seconds.   Neurological:      Mental Status: He is alert and oriented to person, place, and time.   Psychiatric:         Mood and Affect: Mood normal.         Behavior: Behavior normal.         Thought Content: Thought content normal.         Judgment: Judgment normal.          ANNA Arreola  [facility] AT [facility]  Depression Screening Follow-up Plan: Patient's depression screening was positive with a PHQ-2 score of . Their PHQ-9 score was 20. Patient's depressive symptoms likely due to other medical condition. Would recommend treatment of underlying condition. Will continue to monitor at next office visit.  "

## 2024-04-30 NOTE — PATIENT INSTRUCTIONS
Wellness Visit for Adults   AMBULATORY CARE:   A wellness visit  is when you see your healthcare provider to get screened for health problems. Your healthcare provider will also give you advice on how to stay healthy. Write down your questions so you remember to ask them. Ask your healthcare provider how often you should have a wellness visit.  What happens at a wellness visit:  Your healthcare provider will ask about your health, and your family history of health problems. This includes high blood pressure, heart disease, and cancer. He or she will ask if you have symptoms that concern you, if you smoke, and about your mood. You may also be asked about your intake of medicines, supplements, food, and alcohol. Any of the following may be done:  Your weight  will be checked. Your height may also be checked so your body mass index (BMI) can be calculated. Your BMI shows if you are at a healthy weight.    Your blood pressure  and heart rate will be checked. Your temperature may also be checked.    Blood and urine tests  may be done. Blood tests may be done to check your cholesterol levels. Abnormal cholesterol levels increase your risk for heart disease and stroke. You may also need a blood or urine test to check for diabetes if you are at increased risk. Urine tests may be done to look for signs of an infection or kidney disease.    A physical exam  includes checking your heartbeat and lungs with a stethoscope. Your healthcare provider may also check your skin to look for sun damage.    Screening tests  may be recommended. A screening test is done to check for diseases that may not cause symptoms. The screening tests you may need depend on your age, gender, family history, and lifestyle habits. For example, colorectal screening may be recommended if you are 50 years old or older.    Screening tests you need if you are a woman:   A Pap smear  is used to screen for cervical cancer. Pap smears are usually done every 3 to  5 years depending on your age. You may need them more often if you have had abnormal Pap smear test results in the past. Ask your healthcare provider how often you should have a Pap smear.    A mammogram  is an x-ray of your breasts to screen for breast cancer. Experts recommend mammograms every 2 years starting at age 50 years. You may need a mammogram at age 49 years or younger if you have an increased risk for breast cancer. Talk to your healthcare provider about when you should start having mammograms and how often you need them.    Vaccines you may need:   Get an influenza vaccine  every year. The influenza vaccine protects you from the flu. Several types of viruses cause the flu. The viruses change over time, so new vaccines are made each year.    Get a tetanus-diphtheria (Td) booster vaccine  every 10 years. This vaccine protects you against tetanus and diphtheria. Tetanus is a severe infection that may cause painful muscle spasms and lockjaw. Diphtheria is a severe bacterial infection that causes a thick covering in the back of your mouth and throat.    Get a human papillomavirus (HPV) vaccine  if you are female and aged 19 to 26 or male 19 to 21 and never received it. This vaccine protects you from HPV infection. HPV is the most common infection spread by sexual contact. HPV may also cause vaginal, penile, and anal cancers.    Get a pneumococcal vaccine  if you are aged 65 years or older. The pneumococcal vaccine is an injection given to protect you from pneumococcal disease. Pneumococcal disease is an infection caused by pneumococcal bacteria. The infection may cause pneumonia, meningitis, or an ear infection.    Get a shingles vaccine  if you are 60 or older, even if you have had shingles before. The shingles vaccine is an injection to protect you from the varicella-zoster virus. This is the same virus that causes chickenpox. Shingles is a painful rash that develops in people who had chickenpox or have  been exposed to the virus.    How to eat healthy:  My Plate is a model for planning healthy meals. It shows the types and amounts of foods that should go on your plate. Fruits and vegetables make up about half of your plate, and grains and protein make up the other half. A serving of dairy is included on the side of your plate. The amount of calories and serving sizes you need depends on your age, gender, weight, and height. Examples of healthy foods are listed below:  Eat a variety of vegetables  such as dark green, red, and orange vegetables. You can also include canned vegetables low in sodium (salt) and frozen vegetables without added butter or sauces.    Eat a variety of fresh fruits , canned fruit in 100% juice, frozen fruit, and dried fruit.    Include whole grains.  At least half of the grains you eat should be whole grains. Examples include whole-wheat bread, wheat pasta, brown rice, and whole-grain cereals such as oatmeal.    Eat a variety of protein foods such as seafood (fish and shellfish), lean meat, and poultry without skin (turkey and chicken). Examples of lean meats include pork leg, shoulder, or tenderloin, and beef round, sirloin, tenderloin, and extra lean ground beef. Other protein foods include eggs and egg substitutes, beans, peas, soy products, nuts, and seeds.    Choose low-fat dairy products such as skim or 1% milk or low-fat yogurt, cheese, and cottage cheese.    Limit unhealthy fats  such as butter, hard margarine, and shortening.       Exercise:  Exercise at least 30 minutes per day on most days of the week. Some examples of exercise include walking, biking, dancing, and swimming. You can also fit in more physical activity by taking the stairs instead of the elevator or parking farther away from stores. Include muscle strengthening activities 2 days each week. Regular exercise provides many health benefits. It helps you manage your weight, and decreases your risk for type 2 diabetes,  heart disease, stroke, and high blood pressure. Exercise can also help improve your mood. Ask your healthcare provider about the best exercise plan for you.       General health and safety guidelines:   Do not smoke.  Nicotine and other chemicals in cigarettes and cigars can cause lung damage. Ask your healthcare provider for information if you currently smoke and need help to quit. E-cigarettes or smokeless tobacco still contain nicotine. Talk to your healthcare provider before you use these products.    Limit alcohol.  A drink of alcohol is 12 ounces of beer, 5 ounces of wine, or 1½ ounces of liquor.    Lose weight, if needed.  Being overweight increases your risk of certain health conditions. These include heart disease, high blood pressure, type 2 diabetes, and certain types of cancer.    Protect your skin.  Do not sunbathe or use tanning beds. Use sunscreen with a SPF 15 or higher. Apply sunscreen at least 15 minutes before you go outside. Reapply sunscreen every 2 hours. Wear protective clothing, hats, and sunglasses when you are outside.    Drive safely.  Always wear your seatbelt. Make sure everyone in your car wears a seatbelt. A seatbelt can save your life if you are in an accident. Do not use your cell phone when you are driving. This could distract you and cause an accident. Pull over if you need to make a call or send a text message.    Practice safe sex.  Use latex condoms if are sexually active and have more than one partner. Your healthcare provider may recommend screening tests for sexually transmitted infections (STIs).    Wear helmets, lifejackets, and protective gear.  Always wear a helmet when you ride a bike or motorcycle, go skiing, or play sports that could cause a head injury. Wear protective equipment when you play sports. Wear a lifejacket when you are on a boat or doing water sports.    © Copyright [employer] 2023 Information is for End User's use only and may not be sold, redistributed or  otherwise used for commercial purposes.  The above information is an  only. It is not intended as medical advice for individual conditions or treatments. Talk to your doctor, nurse or pharmacist before following any medical regimen to see if it is safe and effective for you.    Cigarette Smoking and Your Health   AMBULATORY CARE:   Risks to your health if you smoke:  Nicotine and other chemicals found in tobacco and e-cigarettes can damage every cell in your body. Even if you are a light smoker, you have an increased risk for cancer, heart disease, and lung disease. If you are pregnant or have diabetes, smoking increases your risk for complications. Nicotine can affect an adolescent's developing brain. This can lead to trouble thinking, learning, or paying attention.  Benefits to your health if you stop smoking:   You decrease respiratory symptoms such as coughing, wheezing, and shortness of breath.    You reduce your risk for cancers of the lung, mouth, throat, kidney, bladder, pancreas, stomach, and cervix. If you already have cancer, you increase the benefits of chemotherapy. You also reduce your risk for cancer returning or a second cancer from developing.    You reduce your risk for heart disease, blood clots, heart attack, and stroke.    You reduce your risk for lung infections, and diseases such as pneumonia, asthma, chronic bronchitis, and emphysema.    Your circulation improves. More oxygen can be delivered to your body. If you have diabetes, you lower your risk for complications, such as kidney, artery, and eye diseases. You also lower your risk for nerve damage. Nerve damage can lead to amputations, poor vision, and blindness.    You improve your body's ability to heal and to fight infections.    An adolescent can help his or her brain and body develop in a healthy way. Talk to your adolescent about all the health risks of nicotine. If you can, start talking about nicotine when your child  is younger than 12 years. This may make it easier for him or her not to start using nicotine as a teenager or adult. Explain to him or her that it is best never to start. It can be hard to try to quit later.    Benefits to the health of others if you stop smoking:  Tobacco is harmful to nonsmokers who breathe in your secondhand smoke. The following are ways the health of others around you may improve when you stop smoking:  You lower the risks for lung cancer, heart disease, and stroke in nonsmoking adults.    If you are pregnant, you lower the risk for miscarriage, early delivery, low birth weight, and stillbirth. You also lower your baby's risk for SIDS, obesity, developmental delay, and neurobehavioral problems, such as ADHD.    If you have children, you lower their risk for ear infections, colds, pneumonia, bronchitis, and asthma.    Follow up with your doctor as directed:  Write down your questions so you remember to ask them during your visits.  For support and more information:   [employer]  [address] [place] [address]. [place] [address]  Phone: 1- 207 - 236-2996  Phone: 7- 872 - [phone]  Web Address: www.lung.org    Smokefree.gov  Phone: 7- 583 - 368-5789  Web Address: www.smokefree.gov  © Copyright Merative 2023 Information is for End User's use only and may not be sold, redistributed or otherwise used for commercial purposes.  The above information is an  only. It is not intended as medical advice for individual conditions or treatments. Talk to your doctor, nurse or pharmacist before following any medical regimen to see if it is safe and effective for you.    Cholesterol and Your Health   AMBULATORY CARE:   Cholesterol  is a waxy, fat-like substance. Your body uses cholesterol to make hormones and new cells, and to protect nerves. Cholesterol is made by your body. It also comes from certain foods you eat, such as meat and dairy products. Your healthcare provider can help  you set goals for your cholesterol levels. Your provider can help you create a plan to meet your goals.  Cholesterol level goals:  Your cholesterol level goals depend on your risk for heart disease, your age, and your other health conditions. The following are general guidelines:  Total cholesterol  includes low-density lipoprotein (LDL), high-density lipoprotein (HDL), and triglyceride levels. The total cholesterol level should be lower than 200 mg/dL and is best at about 150 mg/dL.    LDL cholesterol  is called bad cholesterol  because it forms plaque in your arteries. As plaque builds up, your arteries become narrow, and less blood flows through. When plaque decreases blood flow to your heart, you may have chest pain. If plaque completely blocks an artery that brings blood to your heart, you may have a heart attack. Plaque can break off and form blood clots. Blood clots may block arteries in your brain and cause a stroke. The level should be less than 130 mg/dL and is best at about 100 mg/dL.         HDL cholesterol  is called good cholesterol  because it helps remove LDL cholesterol from your arteries. It does this by attaching to LDL cholesterol and carrying it to your liver. Your liver breaks down LDL cholesterol so your body can get rid of it. High levels of HDL cholesterol can help prevent a heart attack and stroke. Low levels of HDL cholesterol can increase your risk for heart disease, heart attack, and stroke. The level should be at least 40 mg/dL in males or at least 50 mg/dL in females.    Triglycerides  are a type of fat that store energy from foods you eat. High levels of triglycerides also cause plaque buildup. This can increase your risk for a heart attack or stroke. If your triglyceride level is high, your LDL cholesterol level may also be high. The level should be less than 150 mg/dL.    Any of the following can increase your risk for high cholesterol:   Smoking or drinking large amounts of  alcohol    Having overweight or obesity, or not getting enough exercise    A medical condition such as hypertension (high blood pressure) or diabetes    A family history of high cholesterol    Age older than 65    What you need to know about having your cholesterol levels checked:  Adults 20 to 45 years of age should have their cholesterol levels checked every 4 to 6 years. Adults 45 years or older should have their cholesterol checked every 1 to 2 years. You may need your cholesterol checked more often, or at a younger age, if you have risk factors for heart disease. You may also need to have your cholesterol checked more often if you have other health conditions, such as diabetes. Blood tests are used to check cholesterol levels. Blood tests measure your levels of triglycerides, LDL cholesterol, and HDL cholesterol.  How healthy fats affect your cholesterol levels:  Healthy fats, also called unsaturated fats, help lower LDL cholesterol and triglyceride levels. Healthy fats include the following:  Monounsaturated fats  are found in foods such as olive oil, canola oil, avocado, nuts, and olives.    Polyunsaturated fats,  such as omega 3 fats, are found in fish, such as salmon, trout, and tuna. They can also be found in plant foods such as flaxseed, walnuts, and soybeans.    How unhealthy fats affect your cholesterol levels:  Unhealthy fats increase LDL cholesterol and triglyceride levels. They are found in foods high in cholesterol, saturated fat, and trans fat:  Cholesterol  is found in eggs, dairy, and meat.    Saturated fat  is found in butter, cheese, ice cream, whole milk, and coconut oil. Saturated fat is also found in meat, such as sausage, hot dogs, and bologna.    Trans fat  is found in liquid oils and is used in fried and baked foods. Foods that contain trans fats include chips, crackers, muffins, sweet rolls, microwave popcorn, and cookies.    Treatment  for high cholesterol will also decrease your risk  of heart disease, heart attack, and stroke. Treatment may include any of the following:  Lifestyle changes  may include food, exercise, weight loss, and quitting smoking. You may also need to decrease the amount of alcohol you drink. Your healthcare provider will want you to start with lifestyle changes. Other treatment may be added if lifestyle changes are not enough. Your healthcare provider may recommend you work with a team to manage hyperlipidemia. The team may include medical experts such as a dietitian, an exercise or physical therapist, and a behavior therapist. Your family members may be included in helping you create lifestyle changes.    Medicines  may be given to lower your LDL cholesterol, triglyceride levels, or total cholesterol level. You may need medicines to lower your cholesterol if any of the following is true:    You have a history of stroke, TIA, unstable angina, or a heart attack.    Your LDL cholesterol level is 190 mg/dL or higher.    You are age 40 to 75 years, have diabetes or heart disease risk factors, and your LDL cholesterol is 70 mg/dL or higher.    Supplements  include fish oil, red yeast rice, and garlic. Fish oil may help lower your triglyceride and LDL cholesterol levels. It may also increase your HDL cholesterol level. Red yeast rice may help decrease your total cholesterol level and LDL cholesterol level. Garlic may help lower your total cholesterol level. Do not take any supplements without talking to your healthcare provider.    Food changes you can make to lower your cholesterol levels:  A dietitian can help you create a healthy eating plan. Your dietitian can show you how to read food labels and choose foods low in saturated fat, trans fats, and cholesterol.     Decrease the total amount of fat you eat.  Choose lean meats, fat-free or 1% fat milk, and low-fat dairy products, such as yogurt and cheese. Try to limit or avoid red meats. Limit or do not eat fried foods or baked  goods, such as cookies.    Replace unhealthy fats with healthy fats.  Cook foods in olive oil or canola oil. Choose soft margarines that are low in saturated fat and trans fat. Seeds, nuts, and avocados are other examples of healthy fats.    Eat foods with omega-3 fats.  Examples include salmon, tuna, mackerel, walnuts, and flaxseed. Eat fish 2 times per week. Pregnant women should not eat fish that have high levels of mercury, such as shark, swordfish, and johnathan mackerel.         Increase the amount of high-fiber foods you eat.  High-fiber foods can help lower your LDL cholesterol. Aim to get between 20 and 30 grams of fiber each day. Fruits and vegetables are high in fiber. Eat at least 5 servings each day. Other high-fiber foods are whole-grain or whole-wheat breads, pastas, or cereals, and brown rice. Eat 3 ounces of whole-grain foods each day. Increase fiber slowly. You may have abdominal discomfort, bloating, and gas if you add fiber to your diet too quickly.         Eat healthy protein foods.  Examples include low-fat dairy products, skinless chicken and turkey, fish, and nuts.    Limit foods and drinks that are high in sugar.  Your dietitian or healthcare provider can help you create daily limits for high-sugar foods and drinks. The limit may be lower if you have diabetes or another health condition. Limits can also help you lose weight if needed.  Lifestyle changes you can make to lower your cholesterol levels:   Maintain a healthy weight.  Ask your healthcare provider what a healthy weight is for you. Ask your provider to help you create a weight loss plan if needed. Weight loss can decrease your total cholesterol and triglyceride levels. Weight loss may also help keep your blood pressure at a healthy level.    Be physically active throughout the day.  Physical activity, such as exercise, can help lower your total cholesterol level and maintain a healthy weight. Physical activity can also help increase your  HDL cholesterol level. Work with your healthcare provider to create an program that is right for you. Get at least 30 to 40 minutes of moderate physical activity most days of the week. Examples of exercise include brisk walking, swimming, or biking. Also include strength training at least 2 times each week. Your healthcare providers can help you create a physical activity plan.            Do not smoke.  Nicotine and other chemicals in cigarettes and cigars can raise your cholesterol levels. Ask your healthcare provider for information if you currently smoke and need help to quit. E-cigarettes or smokeless tobacco still contain nicotine. Talk to your healthcare provider before you use these products.         Limit or do not drink alcohol.  Alcohol can increase your triglyceride levels. Ask your healthcare provider before you drink alcohol. Ask how much is okay for you to drink in 24 hours or 1 week.    Follow up with your doctor as directed:  Write down your questions so you remember to ask them during your visits.  © Copyright [employer] 2023 Information is for End User's use only and may not be sold, redistributed or otherwise used for commercial purposes.  The above information is an  only. It is not intended as medical advice for individual conditions or treatments. Talk to your doctor, nurse or pharmacist before following any medical regimen to see if it is safe and effective for you.    La depresión   CUIDADO AMBULATORIO:   La depresión es un trastorno del estado de ánimo que causa sentimientos de tristeza o desesperanza que no desaparecen. La depresión puede causar que usted pierda interés en las cosas que antes disfrutaba. Estos sentimientos pueden llegar a interferir con hankins la nena diaria.  Los signos y síntomas comunes son:  Cambios en el apetito, o aumento o pérdida de peso    Dificultad para dormir o permanecer despierto, o dormir demasiado    Fatiga (estar mental y físicamente cansado) o falta  de energía    Sensación de inquietud, irritabilidad o necesidad de alejarse de los demás    Sentirse inútil, desesperanzado, desanimado o culpable    Dificultad para concentrarse, recordar cosas, hacer tareas diarias o sal decisiones    Sentimientos de hacerse daño o suicidarse    Llame al número de emergencias local (911 en los [place]) si:  usted está pensando en lastimarse o lastimar a otra persona.    Usted ha hecho algo a propósito para hacerse daño.    Llame a hankins terapeuta o médico si:  Tricia síntomas empeoran o no mejoran con el tratamiento.    La depresión marli que realice tricia actividades diarias.    Tiene nuevos síntomas desde hankins última consulta.    Usted tiene preguntas o inquietudes acerca de hankins condición o cuidado.    Los siguientes recursos están disponibles en cualquier momento para ayudarlo, si es necesario:  Comuníquese con neetu organización de prevención del suicidio:       Para la 988 Suicide and Crisis Lifeline (línea de la nena 988 contra el suicidio y la crisis):     Llame o envíe un mensaje de texto al 988     Envíe un mensaje de chat en [email or web address]     Llame al 5-127-494-1120 (1-800-273-TALK)    Para la Suicide Hotline (línea de atención al suicida), llame al 9-136-552-6490 (0-092-QNSQOLU)    Para obtener neetu lista de números internacionales: https://save.org/find-help/international-resources/  El tratamiento para la depresión depende de la gravedad de tricia síntomas. Es posible que usted necesite alguno de los siguientes:  La terapia cognitivo conductual (TCC) le enseña a identificar y cambiar patrones de pensamiento negativos.    Los medicamentos antidepresivos se pueden administrar para disminuir o controlar los síntomas. Puede necesitar sal esta medicina por varias semanas antes de que empiece a hacer efecto.    Cuidados personales:  Hable con alguien sobre hankins depresión. Hankins médico puede sugerirle que reciba consejería. Usted podría sentirse más cómodo hablando con un  familiar o amigo sobre hankins depresión. Elija a alguien que usted sepa que será comprensivo y alentador.    Realice actividad física regularmente. La actividad física puede reducir hankins estrés, mejorar hankins estado de ánimo y ayudarle a dormir mejor. Colabore con hankins médico para crear un plan de ejercicios que usted disfrute.         Establezca un horario regular para dormir. Dorina rutina puede ayudarlo a relajarse antes de irse a dormir. Escuche música, carson o anne marie yoga. Trate de irse a dormir y despertarse al mismo tiempo todos los días. El sueño es importante para la sandi emocional.    Consuma alimentos saludables y variados. Los alimentos saludables incluyen frutas, verduras, panes integrales, productos lácteos descremados, benji magras, pescado y fríjoles. Un plan alimenticio saludable es bajo en grasas, sal y azúcar adicional.         No use alcohol, drogas ni productos de nicotina. [place] puede empeorar la depresión o dificultar hankins control. Hable con hankins terapeuta o médico si usted usa alguno de estos productos y necesita ayuda para dejarlo.    Acuda a adalid consultas de control con hankins terapeuta o médico según le indicaron: Hankins médico vigilará hankins progreso en las citas de seguimiento. Hankins médico también controlará hankins medicación si med antidepresivos y le preguntará si el medicamento le está ayudando. Informe a hankins médico sobre cualquier efecto secundario o problemas que usted presente con los medicamentos. Podría ser necesario cambiar el tipo o cantidad de medicamento. Anote adalid preguntas para que se acuerde de hacerlas raulito adalid visitas.  Para apoyo o más información:  [facility] [place] [facility]  [address]  [place] [address]  Phone: 2- [phone]  Phone: 3- [phone]  Web Address: http://www.quita.org  [facility]  [address]  [place] [address]  Phone: 8- 587 - [phone]  Web Address: http://www.suicidepreventionlifeline.org OR  https://[employer].org/Nutrinsic/    © Copyright Merative 2023 Information is for End User's use only and may not be sold, redistributed or otherwise used for commercial purposes.  Esta información es sólo para uso en educación. Hankins intención no es darle un consejo médico sobre enfermedades o tratamientos. Colsulte con hankins médico, enfermera o farmacéutico antes de seguir cualquier régimen médico para saber si es seguro y efectivo para usted.

## 2024-04-30 NOTE — PROGRESS NOTES
[facility] Nutrition Encounter       Millie Lloyd is a 43 y.o. male seen by RD in conjunction with PCP annual physical     PMHx: prediabetes, depression, anxiety     CD4 count:  1123  Viral load:  <20  ART:   Descovy and Tivicay    Typical food/beverage intake:  -Not provided in detail at present encounter, has been trying to eat softer foods (mentioned boiled fish)  -Difficulty chewing due to poor dentition and oral health     Food access  Food Insecurity: Food Insecurity Present (4/30/2024)    Hunger Vital Sign     Worried About Running Out of Food in the Last Year: Sometimes true     Ran Out of Food in the Last Year: Sometimes true         Weight history:   Wt Readings from Last 3 Encounters:   04/30/24 83 kg (183 lb)   02/07/24 85 kg (187 lb 6.4 oz)   01/23/24 82.8 kg (182 lb 9.6 oz)   10/23/23  189#    4# (2%) unintended weight loss over 3 month period   6# (3%) unintended weight loss over 6 month period   BMI 22.87       Nutrition-related labs:    Lab Results   Component Value Date    HGBA1C 6.0 (H) 01/29/2024    HGBA1C 5.6 01/11/2023    HGBA1C 5.6 01/11/2022     Lab Results   Component Value Date    GLUF 92 01/29/2024    LDLCALC 79 01/29/2024    CREATININE 1.02 01/29/2024         Physical findings/skin integrity:  Per CRNP, oral health concerns, dental abscess         Nutrition Diagnosis    Problem: poor nutrition quality of life     Related to: chewing difficulty , depression , and perception that time, interpersonal or financial constraints prevent changes     As Evidenced By: economic constraints that limit food availability  and poor dentition       Nutrition Intervention/Recommendations    Nutrition education intervention: provided    Nutrition recommendations: F/u with [facility] food pantry, gift card donation to use at [employer] for groceries, soft diet recommendations reviewed, avoid skipping meals     Supplement recommendations: No supplement recommendations at this time      Teaching Method: verbal      Person Educated: patient      Comprehension: fair    Receptivity: good    Expected compliance: fair      Collaboration/referral of nutrition care:   Dental  Food pantry locations  Gift card donation to [employer], Nutrition f/u as needed as pt presents to clinic appts or sooner should he reach out       Goals:  Improve diet quality   Dental soft diet (well cooked/canned/frozen steamed vegetables; canned or sauced fruits; yogurt, milk; ground/stewed tender meats, flaked fish, canned tuna/chicken; rice, mashed beans, cereal or oatmeal soaked in milk, jello or puddings, etc)  Avoid skipping meals, try to eat something every 3-4 hours  F/u with [facility] food pantry, utilize $25 [facility] gift card donation       Monitoring/Evaluation:  BW  Diet patterns   Oral health  Food access       Visit Summary  RD met with Millie Lloyd during PCP appt.  Pt having difficulties chewing due to poor dentition and oral health.  Reviewed dental soft MNT recommendations, to which he was receptive. He says that he has been trying to eat softer foods, including boiled fish.    Millie Lloyd also with some food insecurities, helps his mom at lot, and recently SNAP was reduced from $200 down to $120 per month.  He has been going to food pantry, most recent visit was 2 weeks ago, though he is shy about going.  Pt was offered $25 donated [facility] gift card, advised pt of nearest store at which he could use it.  Pt was receptive.    Pt with a lot of stress at this time, step father in the hospital, pt trying to earn money to support self and mom.    Millie Lloyd was agreeable to plan, will try to eat more regularly, soft foods, and f/u with food pantry.    Continue to check in with pt as he presents to PCP appts, or sooner should he need.    Charley Beck, MS, RD, LDN

## 2024-04-30 NOTE — ASSESSMENT & PLAN NOTE
PHQ-9: 20.  Has tried Wellbutrin in past but did not like the way it made him feel.  He is willing to try something else.  Follow with [facility]   F/u in 1 month  Discussed plan about going to ER if he feels like harming himself  No SI or HI at this time  Sertraline 50 mg QD  Pt call office with any side effects

## 2024-05-01 ENCOUNTER — PATIENT OUTREACH (OUTPATIENT)
Dept: SURGERY | Facility: CLINIC | Age: 44
End: 2024-05-01

## 2024-05-01 ENCOUNTER — TELEPHONE (OUTPATIENT)
Dept: SURGERY | Facility: CLINIC | Age: 44
End: 2024-05-01

## 2024-05-01 NOTE — TELEPHONE ENCOUNTER
Pt stated that someone from this telephone number called him and left him a message. Looked through the chart and did not see anything notated that someone left pt a message. Asked pt if he needed any refills. Pt stated that he did. Informed pt that there was 3 different medications sent to the pharmacy. Medications amoxicillin-clavulanate (AUGMENTIN) 875-125 mg per tablet and sertraline (Zoloft) 50 mg tablet was sent to one pharmacy [facility]. The other medication, Empagliflozin (Jardiance) 10 MG TABS tablet was sent to [facility]. Informed pt that one of them was sent to a different pharmacy. Pt is aware and is ok with that. Pt needed a  too as well. Pt verbalized understanding to everything.

## 2024-05-02 NOTE — PROGRESS NOTES
Client's case discussed during huddle.    CM case conference with PCP who reports met with Client during clinic visit.  Client reports symptoms of depression and suicide ideation.   PCP prescribed anti-depressant and referred to S for counseling.  Client endorsed toothache, once evaluated was found to have abscess of tooth.  Client encouraged to visit dental clinic but admitted not being able to follow through due to anxiety and asked if this CM could assist with escort. CM agreed.  Client also reported food insecurity and visiting the [facility] for food pantry.  Client was evaluated by RD as well.    CM called Client and discussed dental needs and willing to escort to dental clinic.  Client reports at the Hospital, visiting step-father who had a heart attack.  Client agree to call CM once back home to discuss dental needs.

## 2024-05-02 NOTE — PROGRESS NOTES
CM case conference with OVR worker (PAUL Husain) who reports tried calling Client to discuss job fair opportunity but unable to reach him.    Worker reports receiving information on job fair for people with criminal background and asked CM to inform Client.    OVR agree to email copy of job fair flyer.  CM agree to inform Client.    CM called Client but was not available.  Message left asking for call back.    CM received copy of job fair flyer via email.

## 2024-05-02 NOTE — PROGRESS NOTES
CM called [facility] and discussed Client's dental diagnosis (dental abscess, poor senior living, erosion of teeth) and recent PCP referral for oral maxillofacial.    [facility] reception agree to schedule client for a dental evaluation today at 3:45 pm at the [place] location.    CM called Client several times and left messages asking for call back.  CM also texted Client requesting call back but there was no response.

## 2024-05-03 ENCOUNTER — PATIENT OUTREACH (OUTPATIENT)
Dept: SURGERY | Facility: CLINIC | Age: 44
End: 2024-05-03

## 2024-05-06 NOTE — PROGRESS NOTES
CM case conference with Clinic Receptionist (JAXON Marsh) who informed Client called asking for assistance addressing medication refill issue.    CM called Client several times with no response. Message left asking for call back.    CM also texted Client asking for call back with no response.

## 2024-05-07 ENCOUNTER — PATIENT OUTREACH (OUTPATIENT)
Dept: SURGERY | Facility: CLINIC | Age: 44
End: 2024-05-07

## 2024-05-07 NOTE — PROGRESS NOTES
CM called [facility] ([phone]) and discussed medical marijuana referral.    CM spoke to reception who reported that medical marijuana evaluation is $250 cash, not covered by insurance. Patient must fax copy of referral sheet, demographic sheet, medical notes of last 3 pcp visit and any imagine test in the last 3 years.  Fax number: [phone].  Location: [address] [place] [address].    CM called [facility] and discussed Client's need for dental evaluation, recent diagnosis: dental abscess, and insurance information.  Dental evaluation appointment scheduled for tomorrow (5/8/2024) at 3:20 pm at [place] location: [facility].    CM called Client and provided update.  CM and Client agree to meet tomorrow at 10:30 to review information and coordinate LYFT for dental appointment.

## 2024-05-08 ENCOUNTER — PATIENT OUTREACH (OUTPATIENT)
Dept: SURGERY | Facility: CLINIC | Age: 44
End: 2024-05-08

## 2024-05-08 NOTE — PROGRESS NOTES
CM met with Client.  Client was provided with written instruction on steps needed to complete medical marijuana evaluation:  - marijuana medical evaluation fee: $250  - must provide referral & demographic sheet  - PCP medical notes of last 3 visit  - Provide any imagine tests within the last 3 years    Client reports will have money to pay the evaluation once he received his SSI benefit check on 6/5/2024.    Client agree to contact this CM on 6/5/24 to assist with referral.    Dental needs discussed.  CM assisted Client coordinate LYFT to today's dental appointment at 3:20 pm.  Lyft  time: 2:40 pm    OVR referral for employment assistance discussed.  CM called OVR worker (Rony Husain) to follow up with referral; but was not available.  CM left message asking for call back.    Client expressed feeling sad because his step-father lost vision on one of his eyes due to a stroke, but grateful because he is still alive.    Moments later, Client called CM reporting arrives at dental clinic.   CM spoke to reception who help Client complete registration is Belgian language.  Client was evaluated by dentist and schedule for oral surgery this Friday (5/10/2024) at 8 am.  CM agree to assist Client coordinate Lyft transportation to appointment.   CM activated lyft return trip.  Client pleased with assistance.   Goal updated.

## 2024-05-09 ENCOUNTER — PATIENT OUTREACH (OUTPATIENT)
Dept: SURGERY | Facility: CLINIC | Age: 44
End: 2024-05-09

## 2024-05-09 NOTE — PROGRESS NOTES
CM called Client and discussed transportation and need for escort for tomorrow's dental appointment.  Client reports he has no one to go with him to appointment and his mom is caring for her  who recently had a stroke.    CM case conference with clinic staff and inquired about availability of escort to Client's dental appointment but there was none.    CM called Client's mom and discussed urgency for Client to adhere to appointment due to dental abscess and avoid future health problem if issue is addressed.  Client's mom agree to escort Client to dental appointment and ask for a neighbor to look after her .     CM called lyft and coordinated transportation for tomorrow's appointment.  time scheduled for 7:20 am with will call return trip.

## 2024-05-10 ENCOUNTER — PATIENT OUTREACH (OUTPATIENT)
Dept: SURGERY | Facility: CLINIC | Age: 44
End: 2024-05-10

## 2024-05-10 NOTE — PROGRESS NOTES
Client's mother (peace) called CM.  Mother reports escorted Client to appointment today and completed dental surgery.  Client was prescribed medications as part of post surgery treatment. Mother could not say which med was prescribed.    Client has no follow up dental appointment.     CM called [facility] and inquire about dental surgery discharge plan and follow up.  CM spoke to reception who reports Client was prescribed Motrin (600 mg, every 6 hours as needed) and Percocet (5-325 mg, every 4 hours as needed), no follow up appts scheduled. Client encouraged to call back if any issues arises.    CM called Client/mother and provided update on medications prescribed.  Client verbalized understanding, would like to follow up with CM next week to develop plan to address other dental needs: tooth replacement, poor dentition.

## 2024-05-16 ENCOUNTER — PATIENT OUTREACH (OUTPATIENT)
Dept: SURGERY | Facility: CLINIC | Age: 44
End: 2024-05-16

## 2024-05-17 ENCOUNTER — PATIENT OUTREACH (OUTPATIENT)
Dept: SURGERY | Facility: CLINIC | Age: 44
End: 2024-05-17

## 2024-05-17 NOTE — PROGRESS NOTES
CM called Client.  Client reports doing better since dental surgery, taking pain medication and antibiotic as prescribed.    Client reports had five teeth removed and would need assistance coordinating follow up dental appointments.    Medication adherence discussed.  Client reports in need of HIV medication refills, tried calling Bonifacio at [facility] but unable to reach him.  CM agree to assist.    CM called Maximilian at [facility] and left message informing about client need for refills and asking for call back.

## 2024-05-28 ENCOUNTER — PATIENT OUTREACH (OUTPATIENT)
Dept: SURGERY | Facility: CLINIC | Age: 44
End: 2024-05-28

## 2024-05-28 DIAGNOSIS — F33.2 SEVERE EPISODE OF RECURRENT MAJOR DEPRESSIVE DISORDER, WITHOUT PSYCHOTIC FEATURES (HCC): ICD-10-CM

## 2024-05-29 ENCOUNTER — PATIENT OUTREACH (OUTPATIENT)
Dept: SURGERY | Facility: CLINIC | Age: 44
End: 2024-05-29

## 2024-05-29 NOTE — PROGRESS NOTES
Client called CM.  Client reports distress due to lack of employment.  Client reports tried calling OVR worker without response.    Client also reports in need of dental f/u appt due to cracking tooth.    CM called OVR worker (NIKUNJQuinn Husain: [phone]) to discuss Client's services but was not available.  CM left message asking for call back.    CM called [facility] and coordinated f/u appt for September 23, 2024 at 10 AM. Client also added to cancellation wait list in case a sooner appt becomes available.    CM called Client and provided update.

## 2024-05-30 ENCOUNTER — PATIENT OUTREACH (OUTPATIENT)
Dept: SURGERY | Facility: CLINIC | Age: 44
End: 2024-05-30

## 2024-05-30 NOTE — ASSESSMENT & PLAN NOTE
Last PHQ-9: 20.  Started sertraline 50 mg at HS.  Reports he is doing well on sertraline.  Continue to follow with VIJAY Barnard for CBT  Continue Sertraline 50 mg HS  Denies any SI or HI's

## 2024-05-30 NOTE — PROGRESS NOTES
Maximilian, CC[employer] rep called CM in response to message left.    CCN rep and CM discussed Client's mediation refill needs.    CCN rep reports Client's medications were mailed two days ago and should receiving them tomorrow.    CM called Client and provided update.    Dental needs discussed.  Client reports doing better since surgery, would like to meet with dental provider to further assess goals.  CM encouraged Client to visit [facility] dental clinic as a walk-in.  Client agreed to do so and report outcome.

## 2024-05-31 ENCOUNTER — OFFICE VISIT (OUTPATIENT)
Dept: SURGERY | Facility: CLINIC | Age: 44
End: 2024-05-31

## 2024-05-31 ENCOUNTER — PATIENT OUTREACH (OUTPATIENT)
Dept: SURGERY | Facility: CLINIC | Age: 44
End: 2024-05-31

## 2024-05-31 VITALS
DIASTOLIC BLOOD PRESSURE: 60 MMHG | SYSTOLIC BLOOD PRESSURE: 131 MMHG | BODY MASS INDEX: 21.88 KG/M2 | OXYGEN SATURATION: 100 % | HEIGHT: 75 IN | RESPIRATION RATE: 17 BRPM | WEIGHT: 176 LBS | TEMPERATURE: 97 F | HEART RATE: 57 BPM

## 2024-05-31 DIAGNOSIS — F33.2 SEVERE EPISODE OF RECURRENT MAJOR DEPRESSIVE DISORDER, WITHOUT PSYCHOTIC FEATURES (HCC): ICD-10-CM

## 2024-05-31 DIAGNOSIS — R73.03 PREDIABETES: ICD-10-CM

## 2024-05-31 DIAGNOSIS — B20 CURRENTLY ASYMPTOMATIC HIV INFECTION, WITH HISTORY OF HIV-RELATED ILLNESS (HCC): Primary | ICD-10-CM

## 2024-05-31 NOTE — ASSESSMENT & PLAN NOTE
Taking Jardiance 10 mg QD.  Pt reports tolerating medication but does admit to have a few episodes of feeling dizzy.  Unclear if this in from hypoglycemia but he does eat something with sugar and dizziness resolves.   Decrease Jardiance to 5 mg for now  Look at ordering Dexcom sensor  Recheck A!C  Recommend eating meals at regular intervals and not skipping also recommend snack at HS

## 2024-05-31 NOTE — PROGRESS NOTES
CM called Client.    CM reminded Client of need to complete annual RW recert and asked Client to provide copy of following docs:  - SSI statement of benefits  - proof of address  - insurance cards.    Client agree to text copy of docs.  
DC instructions

## 2024-05-31 NOTE — PROGRESS NOTES
CM completed Client's annual rw recert forms:  - RW Part-B Payer of last resort  - RW Part-B Acuity Scale    Annual Review completed in [employer].    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    Application confirmation received email email.

## 2024-06-03 ENCOUNTER — PATIENT OUTREACH (OUTPATIENT)
Dept: SURGERY | Facility: CLINIC | Age: 44
End: 2024-06-03

## 2024-06-05 ENCOUNTER — PATIENT OUTREACH (OUTPATIENT)
Dept: SURGERY | Facility: CLINIC | Age: 44
End: 2024-06-05

## 2024-06-06 ENCOUNTER — PATIENT OUTREACH (OUTPATIENT)
Dept: SURGERY | Facility: CLINIC | Age: 44
End: 2024-06-06

## 2024-06-06 NOTE — PROGRESS NOTES
CM met with Client during office visit.    CM assisted Client complete SSA-1020 form (Extra help with Medicare Prescription Drug Plan).    CM mailed application to:  Social Security Administration  [employer]  [address]  [place] [address]    Epic goal added.

## 2024-06-14 ENCOUNTER — PATIENT OUTREACH (OUTPATIENT)
Dept: SURGERY | Facility: CLINIC | Age: 44
End: 2024-06-14

## 2024-06-14 NOTE — PROGRESS NOTES
Client called CM.    Client reports in distress due to lack of employment.  Client states visited [employer] seeking employment but was told not openings available.     Client also reports has tried calling OVR counselor for assistance with employment but no response received.    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    Client pleased with assistance and agree to inform CM if response is received.

## 2024-06-15 NOTE — PROGRESS NOTES
Client called CM. Client reports called pharmacy to  his HIV med and was told his insurance card is inactive. CM checked Client's medicaid status via [facility] and confirmed insurance was discontinued. CM informed Client of [facility] program and asked to meet to complete application. CM also case conference with medical staff ( and PCP) re: need for [facility] medical form completed and signed. CM met with Client, completed SPBP application. CM able to obtain [employer]  [facility] medical form completed from PCP.     CM emailed copy of SPBP application and supportive documentation to [address] [place] [address] program at [email or web address]
[email or web address]

## 2024-06-17 ENCOUNTER — PATIENT OUTREACH (OUTPATIENT)
Dept: SURGERY | Facility: CLINIC | Age: 44
End: 2024-06-17

## 2024-06-17 NOTE — PROGRESS NOTES
Client called CM.    Client reports lost his food stamp card and asked CM for assistance requesting replacement card and apply for employment via online at [email or web address].    CM called [place] [facility] consumer services ([phone]) and submitted request for snap replacement card.   Client will received card in the mail within 7 business days.    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    CM also assisted Client submit [employer] referral for the following services:  - Education and Training Assistance (Workforce Investment Act) - Job Search Assistance (Labor Exchange).  Client pleased with assistance.

## 2024-06-20 ENCOUNTER — PATIENT OUTREACH (OUTPATIENT)
Dept: SURGERY | Facility: CLINIC | Age: 44
End: 2024-06-20

## 2024-06-21 ENCOUNTER — PATIENT OUTREACH (OUTPATIENT)
Dept: SURGERY | Facility: CLINIC | Age: 44
End: 2024-06-21

## 2024-06-21 NOTE — PROGRESS NOTES
CM case conference with OVR Counselor (PAUL Husain) and discussed Client's employment goals.     OVR worker provided CM with additional resources to share with Client:    -[employer]- [employer]- OVR worker reported this place does work clothing for women however, he spoke with worker (Argelia) who encouraged Client to call and give her the details, she can put together a bag of mens work clothing for the person in need. They have been reaching out to locations to donate when men reach out to their office. [phone].    -[employer] [facility]- They have a shop called [employer]. They provide clothes at no cost to those in need. [phone]    -[employer] [place]- This one is at [facility]- They provide free clothing and household items. [phone]    -[facility] has low cost work clothing and regularly has sales of roughly $2 per item.     -[employer] also offers sales and free items. I am not sure how this one works but one of my coworkers suggested it as they have referred people there before.     -[facility]- This one is a food pantry and clothing bank. [phone].    CM called Client and provided update.

## 2024-06-21 NOTE — PROGRESS NOTES
CM case conference with OVR Counselor (PAUL Husain) and discussed Client's employment goals.    CM asked OVR counselor about available work clothing programs free or at low cost for Client to prepare for any potential job interviews.    OVR worker provided the following resources:  Family Thrift Shoppes - [employer] - [employer] serving [place]  PathSourcep.org    Better [employer]rift Shop - [facility] serving [place]  PathSourcep.org    Thrift Shop - [facility] serving [place]  findSojernp.org    Re-Entry Program - [employer] serving [place]  Elastagen.org This program also helps with job assistance and could be useful in that way to.     [employer]- this store is in [address] in [place]. I've been there and they have a good variety of cheap used clothes    CM called Client and provided update.

## 2024-06-25 ENCOUNTER — PATIENT OUTREACH (OUTPATIENT)
Dept: SURGERY | Facility: CLINIC | Age: 44
End: 2024-06-25

## 2024-06-25 ENCOUNTER — TELEPHONE (OUTPATIENT)
Dept: PSYCHIATRY | Facility: CLINIC | Age: 44
End: 2024-06-25

## 2024-06-25 NOTE — TELEPHONE ENCOUNTER
The patient's  contacted the office, requesting an update on the wait list. The writer informed the  that the patient was currently not placed on the wait list. The  asked the writer to follow up with the patient and place him on the wait list for services.

## 2024-06-25 NOTE — PROGRESS NOTES
Client called CM.    Client reports in distress because he had to use his little money left to pay for his mother and step dad's electric and housing bill.    Client explained he feels being used and manipulated by his parents, specially since step-dad lost his job and had the heart attack.    Client expressed feeling desperate and would like to find his own place to live, even if it's a shelter.     Psych referral status discussed with Client.    Client reports has not heard from psych program since he was added to wait list.     CM encouraged Client to call 211 and to ask for emergency housing referral. Client agree to do so and report outcome.     CM called [facility] and inquired about referral status.     CM spoke to worker (ANA LAURA Crum) and explained psych program reached out to Client on 4/7/2024, in which Client decline services and therefore removed from wait list.    Worker agree to reach out to client by phone and ask if he was interest in services and add him to wait list.   No other needs discussed.

## 2024-06-28 NOTE — PROGRESS NOTES
CM called Client to follow up with medical marijuana goal.    CM reminded Client of $250 fee to cover for medical marijuana evaluation.    Client reports received his SSI benefit this month but had to use it to assist mother paying certain bills.    Client agree to contact CM next month to coordinate medical marijuana fee payment.    Dental goal discussed.  Client reports no longer in pain since surgery, continues to adhere to antibiotic treatment.  Client's next dental appt is scheduled for 9/23/2024 at 10 am.    No other needs discussed.

## 2024-07-02 NOTE — PROGRESS NOTES
CM met with Client.     referral discussed.    Client reports has not received call from  program.    CM called [facility] to follow up with referral status.    CM spoke to  and discussed Client's case.   agree to send message to  informing of this CM's call and request for call back.

## 2024-07-03 ENCOUNTER — TELEPHONE (OUTPATIENT)
Dept: SURGERY | Facility: CLINIC | Age: 44
End: 2024-07-03

## 2024-07-03 ENCOUNTER — PATIENT OUTREACH (OUTPATIENT)
Dept: SURGERY | Facility: CLINIC | Age: 44
End: 2024-07-03

## 2024-07-03 NOTE — PROGRESS NOTES
Client called CM.    Client reports received his SSI check today and asked CM for assistance coordinating medical marijuana evaluation appointment.    Client also wants to follow up with Medicare Savings Program application submitted.    Client also reports frustration at home with mother because he is expected to use all his SSI check to pay for rent, utilities and food, instead of splitting bills among the three of them (Client, mother & step father).    CM provided Client with information on benefits of financial literary and implementing a budget. Free Financial literacy referral discussed but Client declined.    Client explained has only $200 of $250 needed for the medical marijuana evaluation.    Client was encouraged to save the $200 and to reach out to this CM once he receives his next SSI check to coordinate medical marijuana appointment.     CM encouraged Client to visit local SSA office and inquire about Medicare Savings application status.    Client agree to visit SSA office and report outcome.

## 2024-07-03 NOTE — TELEPHONE ENCOUNTER
Spoke with patient and confirmed that he is still using nicotine patches. He states he is still using them.

## 2024-07-10 ENCOUNTER — PATIENT OUTREACH (OUTPATIENT)
Dept: SURGERY | Facility: CLINIC | Age: 44
End: 2024-07-10

## 2024-07-11 ENCOUNTER — PATIENT OUTREACH (OUTPATIENT)
Dept: SURGERY | Facility: CLINIC | Age: 44
End: 2024-07-11

## 2024-07-12 ENCOUNTER — PATIENT OUTREACH (OUTPATIENT)
Dept: SURGERY | Facility: CLINIC | Age: 44
End: 2024-07-12

## 2024-07-15 ENCOUNTER — PATIENT OUTREACH (OUTPATIENT)
Dept: SURGERY | Facility: CLINIC | Age: 44
End: 2024-07-15

## 2024-07-15 NOTE — PROGRESS NOTES
CM case conference with Bonifacio ([facility]) who reports Client's medicaid is inactive and unable to refills his HIV medication.    CM checked Client's medicaid via appssavvy portal and found it was still active.    Medicaid ID number provided to [facility].    [facility] agree to update chart and ran insurance information.    CM called Client and provided update.    Client pleased with update.    Client reports his cell phone broke and no longer working.    Client encouraged to contact [facility] wireless to request a replacement phone.    Client agreed to do so and report outcome.

## 2024-07-16 ENCOUNTER — PATIENT OUTREACH (OUTPATIENT)
Dept: SURGERY | Facility: CLINIC | Age: 44
End: 2024-07-16

## 2024-07-16 NOTE — PROGRESS NOTES
CM met with Client.    Client provided copy of new Medicaid. Medicare Rx [employer] prescription card.    CM called Yoli ([facility] Rep) and provided Medicare card information.  [facility] Rep agree to update Client chart's insurance information.     CM and Client called [employer] worker (Alysa Bennett: [phone]) and discussed Client's interest in program services: job search assistance, ESL classes, resume building, professional clothing referral.     Client's mild cognitive impairment diagnosis discussed. Worker completed phone intake and agree to reach out to programs offering employment and call Client with update.    CM review letters received by Client.    Client reports visited the local SSA office and submitted copy of SSA application for Extra Help with Medicare benefits. SSA placed stamp on application as receipt.     Food insecurity discussed.  CM encouraged Client to visit the [facility] for food pantry. Client in agreement.

## 2024-07-16 NOTE — PROGRESS NOTES
"CM case conference with clinic MA (JO-ANN Martinez) re: Client's prior approval for medication.     CM called [facility], spoke to Yoli ([facility] Rep) who reported unable to approve med refills due to inactive Medicare. CM agree to inquire with Client.    CM called Client and inquire about insurance status.    Client reports received new Medicare card in the mail, agree to bring copy of card tomorrow.    Client also expressed frustration and \"feeling down\" due to lack of work, asked CM for assistance with referral.    CM performed online search and contacted [employer]. CM spoke to worker (Alysa Elliott: [phone]) who reports able to assist Client with job search and referral for free/low cost professional clothing for job interview. Worker asked to speak with Client.    CM agree to  once Client comes into the office to complete phone intake. Worker in agreement.   "

## 2024-07-17 ENCOUNTER — PATIENT OUTREACH (OUTPATIENT)
Dept: SURGERY | Facility: CLINIC | Age: 44
End: 2024-07-17

## 2024-07-18 ENCOUNTER — PATIENT OUTREACH (OUTPATIENT)
Dept: SURGERY | Facility: CLINIC | Age: 44
End: 2024-07-18

## 2024-07-22 ENCOUNTER — PATIENT OUTREACH (OUTPATIENT)
Dept: SURGERY | Facility: CLINIC | Age: 44
End: 2024-07-22

## 2024-07-22 DIAGNOSIS — R73.03 PREDIABETES: ICD-10-CM

## 2024-07-23 ENCOUNTER — PATIENT OUTREACH (OUTPATIENT)
Dept: SURGERY | Facility: CLINIC | Age: 44
End: 2024-07-23

## 2024-07-23 RX ORDER — EMPAGLIFLOZIN 10 MG/1
10 TABLET, FILM COATED ORAL EVERY MORNING
Qty: 90 TABLET | Refills: 1 | Status: SHIPPED | OUTPATIENT
Start: 2024-07-23
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
## 2024-07-23 NOTE — PROGRESS NOTES
Client called CM.    Client reported tried calling [facility] to ask for med refills but unable to reach Rep.    Client is concerned since has enough medication for three days.     CM called and spoke to [facility] Rep (Bonifacio) who agree to send med refills later today, Client should receive it by Friday.    CM called Client and provided update.

## 2024-07-25 ENCOUNTER — PATIENT OUTREACH (OUTPATIENT)
Dept: SURGERY | Facility: CLINIC | Age: 44
End: 2024-07-25

## 2024-07-26 ENCOUNTER — PATIENT OUTREACH (OUTPATIENT)
Dept: SURGERY | Facility: CLINIC | Age: 44
End: 2024-07-26

## 2024-07-26 ENCOUNTER — TELEPHONE (OUTPATIENT)
Age: 44
End: 2024-07-26

## 2024-07-26 NOTE — TELEPHONE ENCOUNTER
Contacted patient off of Medication Management  to verify needs of services in attempts to offer patient an appointment at Available Office. Writer verified N/A - NO ANSWER. Writer spoke with pt and attempted to verify information, writer and  did not hear a response from pt.  attempted to greet again, no response from pt.     1st call attempt    [employer] (Panamanian): [phone]

## 2024-07-26 NOTE — TELEPHONE ENCOUNTER
Patient returned call from  to possibly schedule an appointment.    Writer transferred called to intake line for further assistance.

## 2024-07-29 ENCOUNTER — PATIENT OUTREACH (OUTPATIENT)
Dept: SURGERY | Facility: CLINIC | Age: 44
End: 2024-07-29

## 2024-07-29 NOTE — PROGRESS NOTES
Client and step father (eder) called CM.    Step father reports he also received court papers just like Client and asked for information on pro-daysi legal services.    CM called [employer] along with Client/Step father.    CM inquired about legal assistance for criminal defense and told by [employer] reception that they do not provide such assistance, and is limited to immigration, grider and public benefits.    CM informed Client/step father that once they go to court, they can ask the  to assign a  due to inability to obtain one.     Court hearing is scheduled for August 29 at 10:45 am.

## 2024-07-29 NOTE — PROGRESS NOTES
Client and his mother (peace) called CM.    Client reports experiencing food insecurity since step dad lost his job. Step dad applied for snap and SSD benefits which are pending.    CM provided Client with food pantry information:  - [facility]    - [facility]  Phone:  [phone]   Address: [address], [place] [address]    - [facility]  Phone: [phone]   Address: [address], [place] [address]    Client and mother agree to reach out to program.

## 2024-07-30 ENCOUNTER — PATIENT OUTREACH (OUTPATIENT)
Dept: SURGERY | Facility: CLINIC | Age: 44
End: 2024-07-30

## 2024-07-30 NOTE — PROGRESS NOTES
Client called CM.    Client reports received letters from medicare and asked to meet this CM to review them.    CM and Client agree to meet next Monday, July 29 at 11:30 am.

## 2024-07-30 NOTE — PROGRESS NOTES
CM case conference with [facility] worker (DEE Cross) re: Client's need for medical management appointment and lack of cell phone.    worker encouraged to contact this CM for Client's future appts and treatment needs that require close follow up.    Intake appointment scheduled for September 20, at 10:45 am at [address] [place] [address] in [place] and follow up appt scheduled for October 18 at 10 am.     CM called Client and provided update.

## 2024-07-30 NOTE — PROGRESS NOTES
CM called Yoli ([facility] Rep) re: Client's insurance information.     Worker reports updated Client's info, able to confirm approval of medications and deliver schedule.    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      CM called Client and provided update.

## 2024-08-01 ENCOUNTER — PATIENT OUTREACH (OUTPATIENT)
Dept: SURGERY | Facility: CLINIC | Age: 44
End: 2024-08-01

## 2024-08-02 NOTE — PROGRESS NOTES
CM called [facility] pharmacy rep again to follow up with insurance and medication refills needs.    [facility] Rep ran Client's insurance information again and still inactive.    CM called Client.    Client inquire with Client about receiving new insurance card.    Client reports he may have received a new card, agree to check his mail and update this CM.     referral status discussed.  Client reports aware of him being added to wait-list at [facility]. Client agree to update CM if call is received re: intake.     Client reports in need of food.  CM referred Client to the [facility] for food pantry.  Client agree to visit program.  No other needs discussed.

## 2024-08-05 ENCOUNTER — TELEPHONE (OUTPATIENT)
Dept: SURGERY | Facility: CLINIC | Age: 44
End: 2024-08-05

## 2024-08-05 NOTE — PROGRESS NOTES
CM met with Client.    CM assisted Client with [employer] referral.    CM also assisted Client with MEDI referral (Medicare counseling).    CM case conf with Client and MEDI worker re: additional assistance for medicare recipients.    Client encouraged to visit his local SSA office to follow up with Medicare application for additional assistance.    Client agree to do so and report outcome.

## 2024-08-06 ENCOUNTER — PATIENT OUTREACH (OUTPATIENT)
Dept: SURGERY | Facility: CLINIC | Age: 44
End: 2024-08-06

## 2024-08-06 ENCOUNTER — TELEPHONE (OUTPATIENT)
Dept: PSYCHIATRY | Facility: CLINIC | Age: 44
End: 2024-08-06

## 2024-08-06 ENCOUNTER — TELEPHONE (OUTPATIENT)
Dept: SURGERY | Facility: CLINIC | Age: 44
End: 2024-08-06

## 2024-08-06 DIAGNOSIS — E55.9 VITAMIN D DEFICIENCY: ICD-10-CM

## 2024-08-06 DIAGNOSIS — B20 SYMPTOMATIC HIV INFECTION (HCC): ICD-10-CM

## 2024-08-06 RX ORDER — MULTIVITAMIN WITH FOLIC ACID 400 MCG
1 TABLET ORAL EVERY MORNING
Qty: 90 TABLET | Refills: 1 | Status: SHIPPED | OUTPATIENT
Start: 2024-08-06

## 2024-08-06 NOTE — TELEPHONE ENCOUNTER
One week follow up call for New Patient appointment with   Mae Nelson PA-C   on 09/20/2024 was made on 08/06/2024. Writer informed patient of New Patient paperwork needing to be completed 5 days prior to the appointment. Writer confirmed paperwork has been sent via [employer].    Appointment was made on: 07/30/2024

## 2024-08-08 ENCOUNTER — PATIENT OUTREACH (OUTPATIENT)
Dept: SURGERY | Facility: CLINIC | Age: 44
End: 2024-08-08

## 2024-08-08 DIAGNOSIS — A53.9 SYPHILIS: Primary | ICD-10-CM

## 2024-08-09 NOTE — PROGRESS NOTES
Client called CM.    Client reports food insecurity and asked CM for assistance with food pantry referral.    CM inquired about Client visiting the [facility], to which Client reports went there and received a bag of food but is not enough for him, his mother and step dad.    CM provided Client with information on [facility] ([address] [place] [address]) and encouraged him to visit with his mom and step dad.     Client agreed to do so.  No other needs reported.

## 2024-08-09 NOTE — PROGRESS NOTES
CM called Client to follow up with psych referral.    Client reports spoke to psych provider and was given an appointment for 9/20/2024.    CM revised psych notes and Client was asked to complete intake forms sent to his [facility] prior to appointment.    CM offered to assist Client in completing psych forms.    CM and Client agree to meet next Monday, August 12 at 11 am to complete forms.

## 2024-08-14 ENCOUNTER — PATIENT OUTREACH (OUTPATIENT)
Dept: SURGERY | Facility: CLINIC | Age: 44
End: 2024-08-14

## 2024-08-15 ENCOUNTER — PATIENT OUTREACH (OUTPATIENT)
Dept: SURGERY | Facility: CLINIC | Age: 44
End: 2024-08-15

## 2024-08-15 NOTE — PROGRESS NOTES
DEVIN called [employer] ([phone]) and inquired about support services for the formerly incarcerated, trailing and employment assistance.     CM spoke to worker who provided [employer] referral line number:  [phone]).    CM called [facility] referral line and left message asking for call back.

## 2024-08-15 NOTE — PROGRESS NOTES
CM called Binta Christian ([phone]) Community Partner Work Incentives Coordinator at [facility] to inquire about services for the formerly incarcerated. CM left message asking for call back.    CM emailed worker at [email or web address] asking for information on services and how to refer a Client.    Worker replied by stating that she forwarded email to Preston Mcmahan, Re-Entry  for assistance.   Response pending.    CM called [facility] [phone] and inquired about supportive housing services for people with disability.   CM spoke to worker who encouraged CM to go online at [email or web address] and complete referral form.  CM informed psych evaluation needs to be included in the referral.    CM called Client, discussed forms needed prior to his scheduled Psych appt on 8/30/2024.    Client agree to meet CM tomorrow to complete forms.   
negative

## 2024-08-19 ENCOUNTER — PATIENT OUTREACH (OUTPATIENT)
Dept: SURGERY | Facility: CLINIC | Age: 44
End: 2024-08-19

## 2024-08-20 ENCOUNTER — PATIENT OUTREACH (OUTPATIENT)
Dept: SURGERY | Facility: CLINIC | Age: 44
End: 2024-08-20

## 2024-08-21 ENCOUNTER — PATIENT OUTREACH (OUTPATIENT)
Dept: SURGERY | Facility: CLINIC | Age: 44
End: 2024-08-21

## 2024-08-21 NOTE — PROGRESS NOTES
CM received call from [facility] program worker (Isabella: [phone]) and completed phone referral for supportive housing.    Worker agree to email copy of referral form to this CM and explained application needs psychiatric evaluation attached.    CM informed worker of Client's scheduled initial psych appt visit on 9/20/2024.    CM agree to email referral once psych eval is obtained.

## 2024-08-21 NOTE — PROGRESS NOTES
CM met with Client.    CM assisted Client complete psych form he received via [employer] from new psychiatry provider needed prior to his 9/20/2024 appointment.    Housing status discussed.  CM called [facility] program ([phone]) and placed referral for supportive housing.   CM told someone will call back to complete referral and place Client on wait list for services.    Client pleased with assistance.

## 2024-08-22 NOTE — PROGRESS NOTES
CM called [facility] and obtained housing application.    CM called Client and provided update on application received and asked to meet to complete it.    Client reports spoke to Medicaid representative yesterday re: medicare advantage program's food and utility bill assistance program.    Client will meet with medicare worker tomorrow to complete intake.    Client agree to stop by and meet this CM tomorrow after such appt to complete housing application.

## 2024-08-26 ENCOUNTER — PATIENT OUTREACH (OUTPATIENT)
Dept: SURGERY | Facility: CLINIC | Age: 44
End: 2024-08-26

## 2024-08-29 ENCOUNTER — PATIENT OUTREACH (OUTPATIENT)
Dept: SURGERY | Facility: CLINIC | Age: 44
End: 2024-08-29

## 2024-08-29 NOTE — PROGRESS NOTES
Client called CM.    Client reports visited the [facility] in [place] today along with mother and step father for his hearing.    Client met with the county  and was told that his case is postponed and will received a new court hearing date in the mail.    Client inquired about free  and was to contact the  referral service line at [phone] for assistance.    Client asked CM for assistance contacting  referral.    CM and Client agree to meet tomorrow to assist with referral.

## 2024-09-04 ENCOUNTER — TELEPHONE (OUTPATIENT)
Dept: SURGERY | Facility: CLINIC | Age: 44
End: 2024-09-04

## 2024-09-04 NOTE — PROGRESS NOTES
Client called CM.    Client reports visited [facility], met with manager, and submitted an application for employment. Client was told will receive a call back for an interview.    Client also reports called [employer] worker and discussed employment opportunities.    Medical marijuana referral status discussed.  Client agree to meet with this CM once he receives his SSI benefits next month to schedule medical marijuana medical evaluation appointment.    Client reports met with Medicaid representative last week and completed enrollment into the medicaid advantage program. Client hopes to have access to additional benefits such as cash to cover for food, and utilities.  Client agree to update CM once he receives approval.  No other needs reported.

## 2024-09-06 ENCOUNTER — PATIENT OUTREACH (OUTPATIENT)
Dept: SURGERY | Facility: CLINIC | Age: 44
End: 2024-09-06

## 2024-09-06 NOTE — PROGRESS NOTES
09/06/24 5430   [facility] Disability Status   Are you deaf or do you have serious difficulty hearing? N   Are you blind or do you have serious difficulty seeing, even when wearing glasses? N   Because of a physical, mental, or emotional condition, do you have serious difficulty concentrating, remembering, or making decisions? (5 years old or older) N   Do you have serious difficulty walking or climbing stairs? N   Do you have serious difficulty dressing or bathing? N   Because of a physical, mental, or emotional condition, do you have serious difficulty doing errands alone such as visiting the doctor? N        CM received call from [employer] worker in response to message left.    CM called Client and case conference with [employer] worker (NIKUNJQuinn Husain: [phone]), discussed employment needs.    CM assisted Client update resume via [employer] and applied to cleaning job position.    CM also assisted Client create online account with [email or web address] and submit application for employment.    [employer] worker agree to look into job placement opportunities and email information to this CM.    Client agree to update CM if call is received re: employment.     Moments later, CM received email from [employer] worker.

## 2024-09-09 ENCOUNTER — PATIENT OUTREACH (OUTPATIENT)
Dept: SURGERY | Facility: CLINIC | Age: 44
End: 2024-09-09

## 2024-09-09 ENCOUNTER — DOCUMENTATION (OUTPATIENT)
Dept: SURGERY | Facility: CLINIC | Age: 44
End: 2024-09-09

## 2024-09-09 ENCOUNTER — OFFICE VISIT (OUTPATIENT)
Dept: SURGERY | Facility: CLINIC | Age: 44
End: 2024-09-09
Payer: COMMERCIAL

## 2024-09-09 VITALS
HEART RATE: 62 BPM | HEIGHT: 75 IN | SYSTOLIC BLOOD PRESSURE: 122 MMHG | OXYGEN SATURATION: 99 % | DIASTOLIC BLOOD PRESSURE: 59 MMHG | BODY MASS INDEX: 22.36 KG/M2 | WEIGHT: 179.8 LBS | TEMPERATURE: 97 F | RESPIRATION RATE: 16 BRPM

## 2024-09-09 DIAGNOSIS — R73.03 PREDIABETES: ICD-10-CM

## 2024-09-09 DIAGNOSIS — D72.810 LYMPHOPENIA: ICD-10-CM

## 2024-09-09 DIAGNOSIS — F33.2 SEVERE EPISODE OF RECURRENT MAJOR DEPRESSIVE DISORDER, WITHOUT PSYCHOTIC FEATURES (HCC): ICD-10-CM

## 2024-09-09 DIAGNOSIS — B20 CURRENTLY ASYMPTOMATIC HIV INFECTION, WITH HISTORY OF HIV-RELATED ILLNESS (HCC): Primary | ICD-10-CM

## 2024-09-09 DIAGNOSIS — T65.222D TOXIC EFFECT OF TOBACCO CIGARETTE, INTENTIONAL SELF-HARM, SUBSEQUENT ENCOUNTER: ICD-10-CM

## 2024-09-09 DIAGNOSIS — A53.9 SYPHILIS: ICD-10-CM

## 2024-09-09 PROCEDURE — 99214 OFFICE O/P EST MOD 30 MIN: CPT | Performed by: NURSE PRACTITIONER

## 2024-09-09 RX ORDER — SERTRALINE HYDROCHLORIDE 100 MG/1
100 TABLET, FILM COATED ORAL
Qty: 30 TABLET | Refills: 5 | Status: SHIPPED | OUTPATIENT
Start: 2024-09-09

## 2024-09-09 NOTE — ASSESSMENT & PLAN NOTE
Doing well on Descovy and Tivicay with an undetectable VL.  Pt reports 100% medication compliance on HAART.  Stressed the importance of 100% medication adherence  Monitor CD4. HIV RNA, CMP, and CBCD for virologic response and drug toxicities  Follow up with Dr. Parkinson or Dr. Billy   Going for lab work tomorrow  HIV Counseling:    Viral Load: undetectable    CD4 Count: 116      Denies side effects.  Stressed the importance of adherence.  Continue follow up in the ID clinic with Dr. Parkinson or Dr. Billy.    Reviewed the most recent labs, including the CD4 and viral load.  Discussed the risks and benefits of treatment options, instructions for management, importance of treatment adherence, and reduction of risk factors.  Educated on possible medication side effects.    Counseled on routes of HIV transmission, including the risk of  infection.  Emphasized that viral suppression is the best method to prevent HIV transmission.  At this time the pt denies the need for HIV testing of anyone in their life.    Total encounter time was greater than 35 minutes.  Greater than 20 minutes were spent on counseling and patient education.  Pt voices understanding and agreement with treatment plan.

## 2024-09-09 NOTE — ASSESSMENT & PLAN NOTE
A1C unchanged at 6.0.  Was started on Jardiance and reports taking.  Continue taking Jardiance 10 mg QD  Recheck A1C due now

## 2024-09-09 NOTE — PROGRESS NOTES
"[facility] Annual Nutrition Assessment    Millie Lloyd is a 44 y.o. male  seen by RD in conjunction with PCP f/u     Millie Lloyd  is established patient (last annual was 10/23/2023)    PMHx:  depression, anxiety, poor dentition, prediabetes       Clinical Data/Client History    CD4 count:  116  Viral load:  <20  ART:   Descovy and Tivicay      , Patient Navigator: Moises [place]   : n/a    Food assistance: SNAP and Food pantry    Living situation: House or apartment  in [place]     Psychosocial factors: Depression  anxiety     Mobility:  self ambulates    Physical activity: weight training, running, biking most days       Oral health concerns:  F/u with dental, denied oral health concerns       Typical food/beverage intake:  Cooks or mom cooks     Breakfast pancakes, eggs, rodriguez, coffee, water   Lunch nothing   Dinner \"double\" portions (lunch/dinner combined): rice, beans, fried chicken, salad, fruits  Snacks banana   Beverages water, whole milk, orange juice (2 gal per week)    Appetite:  Improving     OTC vitamin, mineral, herbal supplements: denied     Oral/enteral nutrition supplements:  n/a     GI problems: denied n/v/d/c    Food allergies/intolerances:  NKFA    Weight history:  187# (Feb-24)  176# (May-24)      Current body weight:  179# (81.6 kg)   Height:  6' 3\" (1.905 m)  BMI:  22.47  IBW:  196# (89.1 kg)   %IBW  91%     Weight change: 3# weight gain over ~3 months      Nutrition-related labs:    Lab Results   Component Value Date    HGBA1C 6.0 (H) 01/29/2024    HGBA1C 5.6 01/11/2023    HGBA1C 5.6 01/11/2022     Lab Results   Component Value Date    GLUF 92 01/29/2024    LDLCALC 79 01/29/2024    CREATININE 1.02 01/29/2024         Current medications:     Current Outpatient Medications:     acetaminophen (TYLENOL) 650 mg CR tablet, Take 1 tablet (650 mg total) by mouth every 8 (eight) hours as needed for mild pain (Patient not taking: Reported on 4/30/2024), Disp: 30 " tablet, Rfl: 0    albuterol (Ventolin HFA) 90 mcg/act inhaler, Inhale 2 puffs every 6 (six) hours as needed for wheezing, Disp: 18 g, Rfl: 2    chlorhexidine (PERIDEX) 0.12 % solution, Apply 15 mL to the mouth or throat 2 (two) times a day, Disp: 473 mL, Rfl: 2    Cholecalciferol (Vitamin D3) 125 MCG (5000 UT) CAPS, TAKE 1 CAPSULE BY MOUTH DAILY, Disp: 90 capsule, Rfl: 1    dolutegravir (Tivicay) 50 MG TABS, TAKE 1 TABLET BY MOUTH IN THE MORNING, Disp: 90 tablet, Rfl: 1    Empagliflozin (Jardiance) 10 MG TABS tablet, TAKE 1 TABLET BY MOUTH IN THE MORNING, Disp: 90 tablet, Rfl: 1    emtricitabine-tenofovir AF (Descovy) 200-25 MG tablet, TAKE 1 TABLET BY MOUTH IN THE MORNING, Disp: 90 tablet, Rfl: 1    mometasone-formoterol (Dulera) 100-5 MCG/ACT inhaler, Inhale 2 puffs 2 (two) times a day Rinse mouth after use., Disp: 13 g, Rfl: 5    Multiple Vitamin (Tab-A-María) TABS, TAKE 1 TABLET BY MOUTH EVERY MORNING, Disp: 90 tablet, Rfl: 1    nicotine (NICODERM CQ) 7 mg/24hr TD 24 hr patch, Place 1 patch on the skin over 24 hours every 24 hours, Disp: 28 patch, Rfl: 5    sertraline (ZOLOFT) 100 mg tablet, Take 1 tablet (100 mg total) by mouth daily at bedtime, Disp: 30 tablet, Rfl: 5      Physical findings/skin integrity:  n/a      Nutrition Diagnosis    Problem: inconsistent CHO intake     Related to: food and nutrition related knowledge deficit     As Evidenced By: abnormal labs (HgbA1C 6) diet recall  patient interview  verbalizes inaccurate, incomplete, or unsupported beliefs, attitudes, knowledge       Estimated Nutritional Needs    [place] St Jeor REE: 1799 kcal    ~3093-4740 kcal (based on: 27 kcal/kg BW, REE x 1.5)  ~82 g protein (based on: 1 g/kg BW)  ~2856 ml fluid (based on: 35 ml/kg BW)      Current intake estimation: exceeds needs       Nutrition Intervention/Recommendations    Nutrition education intervention: provided    Nutrition recommendations: Limit juice intake, hydrate primarily with water, spread large  dinner meal over 2 smaller meals     Supplement recommendations: No supplement recommendations at this time      Teaching Method: verbal     Person Educated: patient      Comprehension: fair    Receptivity: good    Expected compliance: fair      Collaboration/referral of nutrition care:    Nutrition f/u as needed as pt presents to clinic for appts       Goals:    Improve HgbA1C   Avoid eating double portions at one sitting, spread over 2 smaller meals   Continue exercise   Limit juice intake     Monitoring/Evaluation:    Dietary patterns   Diabetes labs   Exercise       Visit Summary    RD met with Millie Lloyd during PCP f/u appt in order to complete britney required annual nutrition assessment.      Millie Lloyd reported doing well, having better appetite and has been using food pantry for assistance.  He also uses gift card when he has no money for food.  He admits to eating large meal (combines lunch and dinner), RD advised that he split meal into 2 smaller meals in order to avoid excess CHO load. Also advised that he limit total juice consumption for improved HgbA1C, pt admitted to drinking 2 gallons of orange juice per week. Encouraged his exercise routine.  He had no questions or concerns for RD at this time, was receptive to suggestions for improved health.      Charley Beck, MS, RD, LDN

## 2024-09-09 NOTE — PROGRESS NOTES
CM called Client and reminded him of today's PCP appt at 11 am.  Client agree to adhere to appt and meet with this CM afterwards.    CM met with Client.    Client reports met with PCP for follow up today, discussed HIV adherence, nicotine dependence and mental health.  Client reports viral load is undetectable, cd4 count at 116. Client continues to smoke, feeling anxious but feels anxiety medication prescribed by PCP is helping.    Client needs to do blood work, which he agree to do tomorrow.    CM and Client worked on  referral.    CM called [employer] for referral. CM spoke to worker who explained [employer] does not assist with criminal matters and encouraged CM to call the [facility]  for assistance.    CM called 's office and discussed Client's case and need for .    CM encouraged to visit the  website, print/complete application, attach support documents and mailed application.    CM printed and assistance Client partially complete application.    CM asked Client for copy of bank statement.    Client agree to bring copy of bank statement tomorrow.

## 2024-09-09 NOTE — PROGRESS NOTES
Ambulatory Visit  Name: Millie Lloyd      : 1980      MRN: [id number]  Encounter Provider: ANNA Arreola  Encounter Date: 2024   Encounter department: [facility] AT [facility]    Assessment & Plan     He has a dental appt next month  Reminder to stay and let them assess his teeth   Discussed completing labs for tomorrow   1. Currently asymptomatic HIV infection, with history of HIV-related illness (HCC)  Assessment & Plan:  Doing well on Descovy and Tivicay with an undetectable VL.  Pt reports 100% medication compliance on HAART.  Stressed the importance of 100% medication adherence  Monitor CD4. HIV RNA, CMP, and CBCD for virologic response and drug toxicities  Follow up with Dr. Parkinson or Dr. Billy   Going for lab work tomorrow  HIV Counseling:    Viral Load: undetectable    CD4 Count: 116      Denies side effects.  Stressed the importance of adherence.  Continue follow up in the ID clinic with Dr. Parkinson or Dr. Billy.    Reviewed the most recent labs, including the CD4 and viral load.  Discussed the risks and benefits of treatment options, instructions for management, importance of treatment adherence, and reduction of risk factors.  Educated on possible medication side effects.    Counseled on routes of HIV transmission, including the risk of  infection.  Emphasized that viral suppression is the best method to prevent HIV transmission.  At this time the pt denies the need for HIV testing of anyone in their life.    Total encounter time was greater than 35 minutes.  Greater than 20 minutes were spent on counseling and patient education.  Pt voices understanding and agreement with treatment plan.      2. Prediabetes  Assessment & Plan:  A1C unchanged at 6.0.  Was started on Jardiance and reports taking.  Continue taking Jardiance 10 mg QD  Recheck A1C due now  Orders:  -     Hemoglobin A1C; Future  3. Syphilis  Assessment & Plan:  Remains serofast at 1:2 dils.   S/p  "treatment.   Continue to monitor RPR treatment   4. Lymphopenia  Assessment & Plan:  Resolved.   No further workup needed at time.    5. Severe episode of recurrent major depressive disorder, without psychotic features (HCC)  Assessment & Plan:  Feeling better.  Denies any SI or HI's.  Will increase Sertraline to 100 mg at HS  Re evaluate in 4 weeks   Orders:  -     sertraline (ZOLOFT) 100 mg tablet; Take 1 tablet (100 mg total) by mouth daily at bedtime  6. Toxic effect of tobacco cigarette, intentional self-harm, subsequent encounter  Assessment & Plan:  Now smoking 2 ppd due to stress.  Stressed the importance of smoking less  Nicotine Dependency - Primary    Counseled for greater than 15 minutes on the importance of smoking cessation.  Education was given regarding the cardiovascular effects of how nicotine affects the heart, lungs, kidneys, and peripheral vascular system.  Referred to [facility] for enrollment in smoking cessation program.         History of Present Illness     Millie Lloyd is a 44 y.o. male who presents for 3 month follow up visit for management of HIV.  Marlonmario reports he is feeling stressed and is smoking more.  He is smoking 2 packs per day.  Everyone smokes in his house and he is having trouble not smoking.  He is working with [facility] for housing.  His stress is from everything.  He does is not sexually active and does not want any attitude.  He does not talk to many people.     He does not endorse any fever, chills, nausea, vomiting, cough, SOB, diarrhea, or night sweats.      Review of Systems   Constitutional: Negative.    Respiratory: Negative.     Cardiovascular: Negative.    Gastrointestinal: Negative.    Neurological: Negative.    Psychiatric/Behavioral: Negative.         Objective     /59 (BP Location: Right arm, Patient Position: Sitting, Cuff Size: Standard)   Pulse 62   Temp (!) 97 °F (36.1 °C) (Tympanic)   Resp 16   Ht 6' 3\" (1.905 m)   Wt 81.6 kg (179 lb 12.8 " oz)   SpO2 99%   BMI 22.47 kg/m²     Physical Exam  Vitals and nursing note reviewed.   Constitutional:       Appearance: Normal appearance.   Cardiovascular:      Rate and Rhythm: Normal rate and regular rhythm.      Chest Wall: PMI is not displaced.      Pulses: Normal pulses.      Heart sounds: Normal heart sounds.   Pulmonary:      Effort: Pulmonary effort is normal.      Breath sounds: Normal breath sounds.   Abdominal:      General: Bowel sounds are normal.      Palpations: Abdomen is soft.   Musculoskeletal:         General: Normal range of motion.   Skin:     General: Skin is warm and dry.      Capillary Refill: Capillary refill takes less than 2 seconds.   Neurological:      Mental Status: He is alert and oriented to person, place, and time.   Psychiatric:         Mood and Affect: Mood normal.         Behavior: Behavior normal.         Thought Content: Thought content normal.         Judgment: Judgment normal.       Administrative Statements

## 2024-09-09 NOTE — ASSESSMENT & PLAN NOTE
Now smoking 2 ppd due to stress.  Stressed the importance of smoking less  Nicotine Dependency - Primary    Counseled for greater than 15 minutes on the importance of smoking cessation.  Education was given regarding the cardiovascular effects of how nicotine affects the heart, lungs, kidneys, and peripheral vascular system.  Referred to [facility] for enrollment in smoking cessation program.

## 2024-09-09 NOTE — PROGRESS NOTES
CM called [facility]  ([phone]) to follow up with referral status.    CM and Worker discussed Client's incarceration, housing and medical history.    Worker agree to work on referral and reach out to this CM for follow up.

## 2024-09-09 NOTE — ASSESSMENT & PLAN NOTE
Feeling better.  Denies any SI or HI's.  Will increase Sertraline to 100 mg at HS  Re evaluate in 4 weeks

## 2024-09-10 ENCOUNTER — APPOINTMENT (OUTPATIENT)
Dept: LAB | Facility: CLINIC | Age: 44
End: 2024-09-10
Payer: MEDICARE

## 2024-09-10 DIAGNOSIS — R73.03 PREDIABETES: ICD-10-CM

## 2024-09-10 DIAGNOSIS — R80.8 OTHER PROTEINURIA: ICD-10-CM

## 2024-09-10 DIAGNOSIS — Z12.5 SCREENING FOR PROSTATE CANCER: ICD-10-CM

## 2024-09-10 DIAGNOSIS — A53.9 SYPHILIS: ICD-10-CM

## 2024-09-10 LAB
ALBUMIN SERPL BCG-MCNC: 4.4 G/DL (ref 3.5–5)
ALP SERPL-CCNC: 78 U/L (ref 34–104)
ALT SERPL W P-5'-P-CCNC: 14 U/L (ref 7–52)
ANION GAP SERPL CALCULATED.3IONS-SCNC: 9 MMOL/L (ref 4–13)
AST SERPL W P-5'-P-CCNC: 20 U/L (ref 13–39)
BASOPHILS # BLD AUTO: 0.04 THOUSANDS/ÂΜL (ref 0–0.1)
BASOPHILS NFR BLD AUTO: 1 % (ref 0–1)
BILIRUB SERPL-MCNC: 0.39 MG/DL (ref 0.2–1)
BILIRUB UR QL STRIP: NEGATIVE
BUN SERPL-MCNC: 12 MG/DL (ref 5–25)
CALCIUM SERPL-MCNC: 9.8 MG/DL (ref 8.4–10.2)
CHLORIDE SERPL-SCNC: 101 MMOL/L (ref 96–108)
CLARITY UR: CLEAR
CO2 SERPL-SCNC: 29 MMOL/L (ref 21–32)
COLOR UR: NORMAL
CREAT SERPL-MCNC: 0.95 MG/DL (ref 0.6–1.3)
EOSINOPHIL # BLD AUTO: 0.23 THOUSAND/ÂΜL (ref 0–0.61)
EOSINOPHIL NFR BLD AUTO: 5 % (ref 0–6)
ERYTHROCYTE [DISTWIDTH] IN BLOOD BY AUTOMATED COUNT: 13.5 % (ref 11.6–15.1)
GFR SERPL CREATININE-BSD FRML MDRD: 97 ML/MIN/1.73SQ M
GLUCOSE P FAST SERPL-MCNC: 75 MG/DL (ref 65–99)
GLUCOSE UR STRIP-MCNC: NEGATIVE MG/DL
HCT VFR BLD AUTO: 42.8 % (ref 36.5–49.3)
HGB BLD-MCNC: 14.5 G/DL (ref 12–17)
HGB UR QL STRIP.AUTO: NEGATIVE
IMM GRANULOCYTES # BLD AUTO: 0 THOUSAND/UL (ref 0–0.2)
IMM GRANULOCYTES NFR BLD AUTO: 0 % (ref 0–2)
KETONES UR STRIP-MCNC: NEGATIVE MG/DL
LEUKOCYTE ESTERASE UR QL STRIP: NEGATIVE
LYMPHOCYTES # BLD AUTO: 1 THOUSANDS/ÂΜL (ref 0.6–4.47)
LYMPHOCYTES NFR BLD AUTO: 21 % (ref 14–44)
MCH RBC QN AUTO: 33.6 PG (ref 26.8–34.3)
MCHC RBC AUTO-ENTMCNC: 33.9 G/DL (ref 31.4–37.4)
MCV RBC AUTO: 99 FL (ref 82–98)
MONOCYTES # BLD AUTO: 0.44 THOUSAND/ÂΜL (ref 0.17–1.22)
MONOCYTES NFR BLD AUTO: 9 % (ref 4–12)
NEUTROPHILS # BLD AUTO: 3.06 THOUSANDS/ÂΜL (ref 1.85–7.62)
NEUTS SEG NFR BLD AUTO: 64 % (ref 43–75)
NITRITE UR QL STRIP: NEGATIVE
NRBC BLD AUTO-RTO: 0 /100 WBCS
PH UR STRIP.AUTO: 7 [PH]
PLATELET # BLD AUTO: 171 THOUSANDS/UL (ref 149–390)
PMV BLD AUTO: 10.6 FL (ref 8.9–12.7)
POTASSIUM SERPL-SCNC: 4.2 MMOL/L (ref 3.5–5.3)
PROT SERPL-MCNC: 7.7 G/DL (ref 6.4–8.4)
PROT UR STRIP-MCNC: NEGATIVE MG/DL
RBC # BLD AUTO: 4.32 MILLION/UL (ref 3.88–5.62)
SODIUM SERPL-SCNC: 139 MMOL/L (ref 135–147)
SP GR UR STRIP.AUTO: 1.01 (ref 1–1.03)
UROBILINOGEN UR STRIP-ACNC: <2 MG/DL
WBC # BLD AUTO: 4.77 THOUSAND/UL (ref 4.31–10.16)

## 2024-09-10 PROCEDURE — 84153 ASSAY OF PSA TOTAL: CPT

## 2024-09-10 PROCEDURE — 86592 SYPHILIS TEST NON-TREP QUAL: CPT

## 2024-09-10 PROCEDURE — 86593 SYPHILIS TEST NON-TREP QUANT: CPT

## 2024-09-10 PROCEDURE — 81003 URINALYSIS AUTO W/O SCOPE: CPT

## 2024-09-10 PROCEDURE — 83036 HEMOGLOBIN GLYCOSYLATED A1C: CPT

## 2024-09-11 ENCOUNTER — PATIENT OUTREACH (OUTPATIENT)
Dept: SURGERY | Facility: CLINIC | Age: 44
End: 2024-09-11

## 2024-09-11 LAB
BASOPHILS # BLD AUTO: 0 X10E3/UL (ref 0–0.2)
BASOPHILS NFR BLD AUTO: 1 %
CD3+CD4+ CELLS # BLD: 121 /UL (ref 359–1519)
CD3+CD4+ CELLS NFR BLD: 13.4 % (ref 30.8–58.5)
EOSINOPHIL # BLD AUTO: 0.2 X10E3/UL (ref 0–0.4)
EOSINOPHIL NFR BLD AUTO: 5 %
ERYTHROCYTE [DISTWIDTH] IN BLOOD BY AUTOMATED COUNT: 13.3 % (ref 11.6–15.4)
EST. AVERAGE GLUCOSE BLD GHB EST-MCNC: 123 MG/DL
HBA1C MFR BLD: 5.9 %
HCT VFR BLD AUTO: 35.1 % (ref 37.5–51)
HGB BLD-MCNC: 11.6 G/DL (ref 13–17.7)
HIV1 RNA # PLAS NAA DL=20: ABNORMAL {COPIES}/ML
IMM GRANULOCYTES # BLD: 0 X10E3/UL (ref 0–0.1)
IMM GRANULOCYTES NFR BLD: 0 %
LYMPHOCYTES # BLD AUTO: 0.9 X10E3/UL (ref 0.7–3.1)
LYMPHOCYTES NFR BLD AUTO: 20 %
MCH RBC QN AUTO: 33.1 PG (ref 26.6–33)
MCHC RBC AUTO-ENTMCNC: 33 G/DL (ref 31.5–35.7)
MCV RBC AUTO: 100 FL (ref 79–97)
MISCELLANEOUS LAB TEST RESULT: NORMAL
MONOCYTES # BLD AUTO: 0.5 X10E3/UL (ref 0.1–0.9)
MONOCYTES NFR BLD AUTO: 11 %
NEUTROPHILS # BLD AUTO: 3.1 X10E3/UL (ref 1.4–7)
NEUTROPHILS NFR BLD AUTO: 63 %
PLATELET # BLD AUTO: 208 X10E3/UL (ref 150–450)
RBC # BLD AUTO: 3.5 X10E6/UL (ref 4.14–5.8)
RPR SER QL: REACTIVE
RPR SER-TITR: ABNORMAL {TITER}
WBC # BLD AUTO: 4.8 X10E3/UL (ref 3.4–10.8)

## 2024-09-16 ENCOUNTER — PATIENT OUTREACH (OUTPATIENT)
Dept: SURGERY | Facility: CLINIC | Age: 44
End: 2024-09-16

## 2024-09-16 ENCOUNTER — TELEPHONE (OUTPATIENT)
Dept: PSYCHIATRY | Facility: CLINIC | Age: 44
End: 2024-09-16

## 2024-09-16 NOTE — TELEPHONE ENCOUNTER
Patients Name: Millie Lloyd    : 1980    Phone Number: [phone]    Appointment Date:     Appointment time: 11am     Address: [address] [place] [address]  [place] [address]    Drop Off Facility/Office: [facility]    Drop Off Address: [facility] [address], [place] [address]    Cost Center Number: [id number]    Special notes/directions for :    Note for scheduling team:    Send to Ride Booking Pool: [id number]

## 2024-09-18 ENCOUNTER — PATIENT OUTREACH (OUTPATIENT)
Dept: SURGERY | Facility: CLINIC | Age: 44
End: 2024-09-18

## 2024-09-18 NOTE — PROGRESS NOTES
Client called CM.    Client reports received new court hearing date letter via mail.    Client also reports was able to obtain latest bank statement.    CM reminded Client of Psych appt scheduled for September 20 at 11 am.     CM and Client agree to meet tomorrow to complete  referral for legal assistance.    [facility] program worker (Isabella: [phone]) called CM to follow up with referral for housing and psych documents needed.    CM informed worker of Client's Psych appt scheduled on 9/20/24 and will provide copy of Psych evaluation once obtained.   Worker agree to follow up with CM.

## 2024-09-19 ENCOUNTER — PATIENT OUTREACH (OUTPATIENT)
Dept: SURGERY | Facility: CLINIC | Age: 44
End: 2024-09-19

## 2024-09-19 NOTE — TELEPHONE ENCOUNTER
The patient has a new phone number [phone]. Please send notification regarding his [employer]  to this number.    Thank you.

## 2024-09-19 NOTE — PROGRESS NOTES
CM met with Client.    Housing needs were discussed.  CM assisted Client completed and mail [place] application.

## 2024-09-19 NOTE — PROGRESS NOTES
CM called Client.    CM reminded Client of need for latest bank statement for   referral.    Client agree to visit bank and bring copy of bank statement to this CM.

## 2024-09-23 ENCOUNTER — TELEPHONE (OUTPATIENT)
Dept: PSYCHIATRY | Facility: CLINIC | Age: 44
End: 2024-09-23

## 2024-09-23 ENCOUNTER — PATIENT OUTREACH (OUTPATIENT)
Dept: SURGERY | Facility: CLINIC | Age: 44
End: 2024-09-23

## 2024-09-23 NOTE — LETTER
24     Millie Lloyd   : 1980   [address] [place] [id number]       It is the policy of [facility] to monitor and manage appointments that have been no-showed or cancelled with less than 48-hour notice. This is necessary to ensure that we are able to provide timely access for all patients to our providers. Undue numbers of unutilized appointments delays necessary medical care for all patients.      Dear Millie Lloyd:      We are sorry that you missed your appointment with Mae Nelson PA-C on 2024. Your health and follow-up care are important to us. As this was a New Patient appointment, you will need to contact the office at [phone] if you would like to reschedule.    Please be aware that our office policy states that if you 'no show' two or more Medication Management  appointments without prior notice of cancellation within in a calendar year, you may be discharged from Medication Management  treatment.  We want to bring this to your attention now to prevent an interruption of your care.  If you have any questions about this policy, please call us at the number above.     If we do not hear from you within 10 business days to make a follow up appointment, we will assume you are no longer interested in care here.    Thank you in advance for your cooperation and assistance.       Sincerely,      [facility] Support Staff                                   Es política de [facility] monitorear y administrar las citas que no se conway presentado o se conway cancelado con menos de 48 horas de anticipación. [place] es necesario para garantizar que podamos proporcionar acceso oportuno a todos los pacientes a nuestros proveedores. Un número excesivo de citas no utilizadas retrasa la atención médica necesaria para todos los pacientes.    Estimado Millie Estrada:    Lamentamos que haya perdido hankins ana con  "Mae Nelson PA-C el 20/09/2024. Chambers sandi y atención de seguimiento son importantes para nosotros. Floral esta fue neetu ana para pacientes nuevos, deberá comunicarse con la oficina al [phone] si desea reprogramarla.    Tenga en cuenta que la política de nuestra oficina establece que si \"no se presenta\" a dos o más citas de Administración de Medicamentos sin previo aviso de cancelación dentro de un año calendario, puede ser dado de nabil del tratamiento de Administración de Medicamentos.  Queremos llamar chambers atención sobre esto ahora para evitar neetu interrupción de chambers atención.  Si tiene alguna pregunta sobre esta política, llámenos al número anterior.     Si no tenemos noticias suyas dentro de los 10 días hábiles para programar neetu ana de seguimiento, asumiremos que ya no está interesado en recibir atención aquí.    Zara de antemano por chambers cooperación y asistencia.    Sinceramente    Personal de apoyo de [facility]   "

## 2024-09-25 ENCOUNTER — TELEPHONE (OUTPATIENT)
Age: 44
End: 2024-09-25

## 2024-09-25 ENCOUNTER — PATIENT OUTREACH (OUTPATIENT)
Dept: SURGERY | Facility: CLINIC | Age: 44
End: 2024-09-25

## 2024-09-25 NOTE — PROGRESS NOTES
CM called Client, discussed psych appt adherence.    Client reports missed appt because transportation did not picked him up.    Client reports receiving letter in the mail , not sure from whom, asked to meet CM to review it.    CM reminded Client of need for bank statement for   referral.  Client agree to stop by tomorrow and bring documents.     CM called [facility] and coordinated new appointment for January 6, 2025 at 1:30 pm located at [address] [place] [address] in [place].

## 2024-09-25 NOTE — TELEPHONE ENCOUNTER
PEP from the CC received a call from Moises BELTRAN from the [facility] requesting to reschedule the Np appt that was No showed. The PEP transferred the call to the writer for assistance. The patient is now scheduled for 1/6/24 at 1:30 pm.

## 2024-09-27 ENCOUNTER — PATIENT OUTREACH (OUTPATIENT)
Dept: SURGERY | Facility: CLINIC | Age: 44
End: 2024-09-27

## 2024-09-30 NOTE — PROGRESS NOTES
CM met with Client during office visit.     referral goal discussed with Client.    Client brought in bank statement as previously requested.    CM assisted Client complete  application, attached referral letter and supportive docs.    CM mailed application to:  Office of   [address] [address]

## 2024-10-01 ENCOUNTER — PATIENT OUTREACH (OUTPATIENT)
Dept: SURGERY | Facility: CLINIC | Age: 44
End: 2024-10-01

## 2024-10-01 NOTE — PROGRESS NOTES
Client and his mother (Blossom) called CM.    Client reports mother is in need of guidance on how to apply for an increase in SSI benefits.   Mother explained her benefits were reduced months ago due to failure to provide copy of apartment lease. She now receives $430 per month.  Mother explained was fearful to submit information because she had her employed partner living with her at the time, but now her partner is unemployed due to stroke.    CM encouraged Client's mother to visit local SSA office and report changes in income and household composition, that way she could get an increase in SSI benefits.   CM also encouraged her to apply for a two bedroom apartment for public housing, that way her and Client can have separate, private rooms (Client currently sleeps in the couch living room).    Client agree to visit SSA office with mother and report changes.  No other needs discussed.

## 2024-10-01 NOTE — PROGRESS NOTES
CM received call from [facility] worker (Jonah: [phone]) who explained received Client's housing application and needing to revise application.    CM and worker discussed Client's housing, income and medical status.  Client's demographics were updated as well.    Worker agree to submit application and reports Client will receive a letter via mail notifying him of approval by January 2025.  CM pleased with update.

## 2024-10-03 ENCOUNTER — PATIENT OUTREACH (OUTPATIENT)
Dept: SURGERY | Facility: CLINIC | Age: 44
End: 2024-10-03

## 2024-10-03 NOTE — PROGRESS NOTES
Client called CM.    Client reports interested in scheduling medical marijuana medical eval appointment.    CM reminded Client of $250 fee for the eval appointment and encouraged Client to stop by the office to coordinate appointment.    CM inquired about any notice received re: [facility] hearing date.  Client reports has not received any notices and will report to this CM once received.    Housing goal discussed.  CM reminded Client of need to complete Psych eval appt scheduled for 1/6/2025 at 1:30 pm required for housing application.  Client verbalized understanding.  No other needs reported.

## 2024-10-09 ENCOUNTER — TELEPHONE (OUTPATIENT)
Dept: SURGERY | Facility: CLINIC | Age: 44
End: 2024-10-09

## 2024-10-09 ENCOUNTER — PATIENT OUTREACH (OUTPATIENT)
Dept: SURGERY | Facility: CLINIC | Age: 44
End: 2024-10-09

## 2024-10-09 NOTE — PROGRESS NOTES
[facility] Rep (Will) called CM.    Rep reports is unable to reach Client via cell.  CM informed [facility]N Rep that Client no longer has cell phone and he can reach him at this mother's cell: [phone].    [facility] rep reports insurance is refusing to cover for med (Jardiance).   [facility]N Rep agree to reach out to PCP to discuss how to resolve issue.

## 2024-10-10 ENCOUNTER — TELEPHONE (OUTPATIENT)
Dept: SURGERY | Facility: CLINIC | Age: 44
End: 2024-10-10

## 2024-10-14 ENCOUNTER — PATIENT OUTREACH (OUTPATIENT)
Dept: SURGERY | Facility: CLINIC | Age: 44
End: 2024-10-14

## 2024-10-14 NOTE — PROGRESS NOTES
Client called CM.    Client reports received call from [facility] and discussed application status, updated demographic information.     Client told will received notification by mail on application status.    CM reminded Client of importance to complete Psychiatric evaluation as part of housing goal.  CM reminded Client of upcoming psych appt scheduled for January 6, 2025 at 1:30 pm.    Client also reports received written notification of court hearing scheduled for tomorrow at 9:30 am.    CM asked Client to text or bring copy of document to send to the  as part of referral process.   Client agree to stop by and bring copy of court hearing document.

## 2024-10-15 ENCOUNTER — PATIENT OUTREACH (OUTPATIENT)
Dept: SURGERY | Facility: CLINIC | Age: 44
End: 2024-10-15

## 2024-10-15 NOTE — PROGRESS NOTES
[facility] rep (will) called CM.    Rep reports unable to reach Client to discuss med refills needs/delivery.    CM called Client and provided update on [facility] rep attempts to reach him.  CM encouraged Client to call Rep.     Moments later, Client called CM, reported speaking with [facility] and ed refill needs addressed.

## 2024-10-15 NOTE — PROGRESS NOTES
CM met with Client.    CM assisted Client activate new cell phone.    Client pleased with assistance.

## 2024-10-16 NOTE — PROGRESS NOTES
CM met with Client.    Client reports visited [facility] [address] and was told that his hearing was postponed a second time and will receive a letter with new hearing date.    Client provided CM with copy of today's hearing date notice and  referral approval letter.    CM reviewed letter and informing Client his application for legal assistance was approved and has  assigned: Dottie Crenshaw ([phone]).     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     Client agree to update CM once new hearing court notice is received.    Client's case discussed during huddle.

## 2024-10-18 ENCOUNTER — OFFICE VISIT (OUTPATIENT)
Dept: SURGERY | Facility: CLINIC | Age: 44
End: 2024-10-18

## 2024-10-18 VITALS
WEIGHT: 171 LBS | DIASTOLIC BLOOD PRESSURE: 69 MMHG | OXYGEN SATURATION: 97 % | TEMPERATURE: 97.5 F | BODY MASS INDEX: 21.26 KG/M2 | SYSTOLIC BLOOD PRESSURE: 118 MMHG | HEIGHT: 75 IN | HEART RATE: 79 BPM | RESPIRATION RATE: 17 BRPM

## 2024-10-18 DIAGNOSIS — Z23 NEED FOR INFLUENZA VACCINATION: ICD-10-CM

## 2024-10-18 DIAGNOSIS — B20 CURRENTLY ASYMPTOMATIC HIV INFECTION, WITH HISTORY OF HIV-RELATED ILLNESS (HCC): Primary | ICD-10-CM

## 2024-10-18 DIAGNOSIS — T65.222D TOXIC EFFECT OF TOBACCO CIGARETTE, INTENTIONAL SELF-HARM, SUBSEQUENT ENCOUNTER: ICD-10-CM

## 2024-10-18 DIAGNOSIS — Z23 NEED FOR COVID-19 VACCINE: ICD-10-CM

## 2024-10-18 DIAGNOSIS — R73.03 PREDIABETES: ICD-10-CM

## 2024-10-18 DIAGNOSIS — R41.89 COGNITIVE IMPAIRMENT: ICD-10-CM

## 2024-10-18 DIAGNOSIS — F33.2 SEVERE EPISODE OF RECURRENT MAJOR DEPRESSIVE DISORDER, WITHOUT PSYCHOTIC FEATURES (HCC): ICD-10-CM

## 2024-10-18 DIAGNOSIS — A53.9 SYPHILIS: ICD-10-CM

## 2024-10-19 NOTE — ASSESSMENT & PLAN NOTE
Status posttreatment with 3 doses of IM benzathine penicillin 2021.  RPR remains stable 1:2 dil.  Will monitor RPR annually

## 2024-10-19 NOTE — ASSESSMENT & PLAN NOTE
Mood appears stable today.  The patient is only taking half of his Zoloft dose.  Recommend follow-up with behavioral health, PCP for medication management.  PCP notified of how patient is taking his medications

## 2024-10-19 NOTE — ASSESSMENT & PLAN NOTE
Patient continues to smoke cigarettes, currently 1 to 2 packs daily.  Encourage smoking cessation

## 2024-10-19 NOTE — ASSESSMENT & PLAN NOTE
Good virologic control on Tivicay and Descovy with undetectable viral load and CD4 remains between 100-200.   -Continue Tivicay and Descovy   -Recheck labs in 5 months to assess for virologic control, coinfection's, drug toxicity   -Follow-up in 6 months   -Patient was provided medication, adherence and prevention education  Orders:    COVID-19 Pfizer mRNA vaccine 12 yr and older (Comirnaty pre-filled syringe)    Flublok (recombinant) 0.5 mL IM

## 2024-10-19 NOTE — PROGRESS NOTES
Ambulatory Visit  Name: Millie Lloyd      : 1980      MRN: [id number]  Encounter Provider: Sonam Billy MD  Encounter Date: 10/18/2024   Encounter department: ASC AT [facility]    Assessment & Plan  Currently asymptomatic HIV infection, with history of HIV-related illness (HCC)  Good virologic control on Tivicay and Descovy with undetectable viral load and CD4 remains between 100-200.   -Continue Tivicay and Descovy   -Recheck labs in 5 months to assess for virologic control, coinfection's, drug toxicity   -Follow-up in 6 months   -Patient was provided medication, adherence and prevention education  Orders:    COVID-19 Pfizer mRNA vaccine 12 yr and older (Comirnaty pre-filled syringe)    Flublok (recombinant) 0.5 mL IM    Need for influenza vaccination  Influenza vaccine received. Patient decline COVID vaccine  Orders:    Flublok (recombinant) 0.5 mL IM    Toxic effect of tobacco cigarette, intentional self-harm, subsequent encounter  Patient continues to smoke cigarettes, currently 1 to 2 packs daily.  Encourage smoking cessation    Cognitive impairment  Support services per case management     Syphilis  Status posttreatment with 3 doses of IM benzathine penicillin .  RPR remains stable 1:2 dil.  Will monitor RPR annually       Severe episode of recurrent major depressive disorder, without psychotic features (HCC)  Mood appears stable today.  The patient is only taking half of his Zoloft dose.  Recommend follow-up with behavioral health, PCP for medication management.  PCP notified of how patient is taking his medications  Prediabetes  Hemoglobin A1c 5.9%.  Will continue to monitor.  Management per PCP      My recommendations were discussed with the patient in detail who verbalized understanding. Patient care coordinated with ANNA Becerril.      Subjective:  Routine follow-up for HIV.  Patient claims 100% adherence with Descovy and Tivicay. Patient denies any notable side effects.   "The patient denies any fever chills or sweats, denies any nausea vomiting or diarrhea, denies any cough or shortness of breath. Mood is good today. He reports he is only taking half his Zoloft dose.    ROS:  A complete review of systems is negative other than that noted above in the subjective    Followup portions patient history reviewed and updated as:  Allergies, current medications, past medical history, past social history, past surgical history, and the problem list    Objective:  Vitals:  Vitals:    10/18/24 0831   BP: 118/69   BP Location: Right arm   Patient Position: Sitting   Cuff Size: Large   Pulse: 79   Resp: 17   Temp: 97.5 °F (36.4 °C)   TempSrc: Tympanic   SpO2: 97%   Weight: 77.6 kg (171 lb)   Height: 6' 3\" (1.905 m)       Physical Exam:   General Appearance:  Alert, interactive, appearing well,  nontoxic, no acute distress.   Neck:   Supple without lymphadenopathy, no thyromegaly or masses   Throat/mouth: Oropharynx moist without lesions. Poor dentition    Lungs:   Clear to auscultation bilaterally; no wheezes, rhonchi or rales; respirations unlabored   Heart:  RRR; no murmur, rub or gallop   Abdomen:   Soft, non-tender, non-distended, positive bowel sounds.     Extremities: No clubbing, cyanosis or edema   Skin: No new rashes or lesions. No draining wounds noted.       Labs, Imaging, & Other studies:   All pertinent labs and imaging studies were personally reviewed    Lab Results   Component Value Date    K 4.2 09/10/2024     09/10/2024    CO2 29 09/10/2024    BUN 12 09/10/2024    CREATININE 0.95 09/10/2024    GLUF 75 09/10/2024    CALCIUM 9.8 09/10/2024    AST 20 09/10/2024    ALT 14 09/10/2024    ALKPHOS 78 09/10/2024    EGFR 97 09/10/2024     Lab Results   Component Value Date    WBC 4.77 09/10/2024    WBC 4.8 09/10/2024    HGB 14.5 09/10/2024    HGB 11.6 (L) 09/10/2024    HCT 42.8 09/10/2024    HCT 35.1 (L) 09/10/2024    MCV 99 (H) 09/10/2024     (H) 09/10/2024     " 09/10/2024     09/10/2024     Lab Results   Component Value Date    HEPCAB Non-reactive 01/29/2024     Lab Results   Component Value Date    HAV Reactive (A) 06/15/2021    HEPCAB Non-reactive 01/29/2024     Lab Results   Component Value Date    RPR Reactive (A) 09/10/2024    RPR Reactive 2 dils (A) 09/10/2024     CD4 ABS   Date/Time Value Ref Range Status   09/10/2024 09:01  (L) 359 - 1519 /uL Final     HIV-1 RNA by PCR, Qn   Date/Time Value Ref Range Status   04/13/2023 09:15 AM <20 copies/mL Final     Comment:     HIV-1 RNA not detected  The reportable range for this assay is 20 to 10,000,000  copies HIV-1 RNA/mL.           Current Outpatient Medications:     albuterol (Ventolin HFA) 90 mcg/act inhaler, Inhale 2 puffs every 6 (six) hours as needed for wheezing, Disp: 18 g, Rfl: 2    chlorhexidine (PERIDEX) 0.12 % solution, Apply 15 mL to the mouth or throat 2 (two) times a day, Disp: 473 mL, Rfl: 2    Cholecalciferol (Vitamin D3) 125 MCG (5000 UT) CAPS, TAKE 1 CAPSULE BY MOUTH DAILY, Disp: 90 capsule, Rfl: 1    dolutegravir (Tivicay) 50 MG TABS, TAKE 1 TABLET BY MOUTH IN THE MORNING, Disp: 90 tablet, Rfl: 1    Empagliflozin (Jardiance) 10 MG TABS tablet, TAKE 1 TABLET BY MOUTH IN THE MORNING, Disp: 90 tablet, Rfl: 1    emtricitabine-tenofovir AF (Descovy) 200-25 MG tablet, TAKE 1 TABLET BY MOUTH IN THE MORNING, Disp: 90 tablet, Rfl: 1    mometasone-formoterol (Dulera) 100-5 MCG/ACT inhaler, Inhale 2 puffs 2 (two) times a day Rinse mouth after use., Disp: 13 g, Rfl: 5    Multiple Vitamin (Tab-A-María) TABS, TAKE 1 TABLET BY MOUTH EVERY MORNING, Disp: 90 tablet, Rfl: 1    nicotine (NICODERM CQ) 7 mg/24hr TD 24 hr patch, Place 1 patch on the skin over 24 hours every 24 hours, Disp: 28 patch, Rfl: 5    acetaminophen (TYLENOL) 650 mg CR tablet, Take 1 tablet (650 mg total) by mouth every 8 (eight) hours as needed for mild pain (Patient not taking: Reported on 4/30/2024), Disp: 30 tablet, Rfl: 0     sertraline (ZOLOFT) 100 mg tablet, Take 1 tablet (100 mg total) by mouth daily at bedtime (Patient not taking: Reported on 10/18/2024), Disp: 30 tablet, Rfl: 5

## 2024-10-21 DIAGNOSIS — D72.89 OTHER SPECIFIED DISORDERS OF WHITE BLOOD CELLS: ICD-10-CM

## 2024-10-21 DIAGNOSIS — R73.03 PREDIABETES: ICD-10-CM

## 2024-10-21 DIAGNOSIS — Z11.1 SCREENING-PULMONARY TB: ICD-10-CM

## 2024-10-21 DIAGNOSIS — Z20.2 CONTACT WITH AND (SUSPECTED) EXPOSURE TO INFECTIONS WITH A PREDOMINANTLY SEXUAL MODE OF TRANSMISSION: ICD-10-CM

## 2024-10-21 DIAGNOSIS — Z11.3 ENCOUNTER FOR SCREENING FOR BACTERIAL SEXUALLY TRANSMITTED DISEASE: ICD-10-CM

## 2024-10-21 DIAGNOSIS — Z13.220 ENCOUNTER FOR LIPID SCREENING FOR CARDIOVASCULAR DISEASE: ICD-10-CM

## 2024-10-21 DIAGNOSIS — Z72.89 OTHER PROBLEMS RELATED TO LIFESTYLE: ICD-10-CM

## 2024-10-21 DIAGNOSIS — B20 CURRENTLY ASYMPTOMATIC HIV INFECTION, WITH HISTORY OF HIV-RELATED ILLNESS (HCC): Primary | ICD-10-CM

## 2024-10-21 DIAGNOSIS — Z13.6 ENCOUNTER FOR LIPID SCREENING FOR CARDIOVASCULAR DISEASE: ICD-10-CM

## 2024-10-21 DIAGNOSIS — A53.9 SYPHILIS: ICD-10-CM

## 2024-10-24 ENCOUNTER — PATIENT OUTREACH (OUTPATIENT)
Dept: SURGERY | Facility: CLINIC | Age: 44
End: 2024-10-24

## 2024-10-24 ENCOUNTER — TELEPHONE (OUTPATIENT)
Dept: SURGERY | Facility: CLINIC | Age: 44
End: 2024-10-24

## 2024-10-24 NOTE — TELEPHONE ENCOUNTER
"Data: A brief case consult was developed with pt's assigned MCM to review pt's current life's status and needs. Pt was contacted at 9:50 a.m. by [facility], per his request, to address concerns, and request extended support. During today's contact, pt shared that he found 2 different living units, and his desire to move with the assistance of [facility]'s Care team. Pt emphasized the need to complete a psychiatric evaluation to benefit from assisted housing, along with his need to benefit from psychotherapeutic interventions. Pt shared been working diligently to acquire suitable employment, and relying on his MCM for support.     Pt's emotions and cognitions were validated throughout contact, along with receiving positive reinforcements for his willingness to address needs with extended support. Pt was reminded of upcoming psychiatric evaluation, and it was explored pt's willingness to be referred to [facility] to receive psychotherapeutic services. Pt agreed on offered suggestion. Request for referral was submitted accordingly to pt's assigned PCP, and case consult was created with pt's assigned MCM.     At the end of contact, pt expressed gratitude for the assistance provided, along with expressing his perseverance by \"doing the right thing and not giving up\". No SI or HI were disclosed by pt.   "

## 2024-10-25 NOTE — PROGRESS NOTES
CM case conference with [facility] re: Client's psych referral status, mental health history, employment and housing referral.    CM updated [facility] about Client's upcoming Psych appt scheduled for 1/6/2025.    Referral to [facility] discussed.

## 2024-10-31 ENCOUNTER — PATIENT OUTREACH (OUTPATIENT)
Dept: SURGERY | Facility: CLINIC | Age: 44
End: 2024-10-31

## 2024-11-04 ENCOUNTER — PATIENT OUTREACH (OUTPATIENT)
Dept: SURGERY | Facility: CLINIC | Age: 44
End: 2024-11-04

## 2024-11-04 NOTE — PROGRESS NOTES
CM case conference with [facility] and psych  (Bindu Trimble) re: Client's referral for talk therapy.     reports Client has been added to the wait list for talk therapy. Once an appointment becomes available, they will reach out to the patient for scheduling.

## 2024-11-06 ENCOUNTER — PATIENT OUTREACH (OUTPATIENT)
Dept: SURGERY | Facility: CLINIC | Age: 44
End: 2024-11-06

## 2024-11-08 NOTE — PROGRESS NOTES
CM met with Client.    Client reports visited the [facility] court house for his hearing but case was postponed again because  was not available.    Client was also informed he was given a non-traffic citation for harrassment from the same person who claims he stole from.    Client expressed feeling overwhelmed due to legal issues but remains optimistic about cleaning his name.    Client agree to inform CM once new hearing notice is received.

## 2024-12-05 ENCOUNTER — PATIENT OUTREACH (OUTPATIENT)
Dept: SURGERY | Facility: CLINIC | Age: 44
End: 2024-12-05

## 2024-12-09 NOTE — PROGRESS NOTES
Client called CM.    Client reports loss all information on his cell phone and asked for clinic's phone number and upcoming medica appointments.    CM provided Client with upcoming appointments:  - PCP: 1/6/2024 @ 11:30 am   - Psychiatry: 1/6/2024 @ 1:30 pm  - ID: 4/25/2025 @ 8 am    CM provided Client with clinic phone number.    Employment status discussed.  Client reports has been looking for work with no success.  No other needs reported.

## 2024-12-12 ENCOUNTER — PATIENT OUTREACH (OUTPATIENT)
Dept: SURGERY | Facility: CLINIC | Age: 44
End: 2024-12-12

## 2024-12-16 ENCOUNTER — PATIENT OUTREACH (OUTPATIENT)
Dept: SURGERY | Facility: CLINIC | Age: 44
End: 2024-12-16

## 2024-12-16 NOTE — PROGRESS NOTES
Client called CM.    Client reports adherent to court hearing and decided to plea guilty to theft charges. Client was mandated to report to the court weekly until next court hearing scheduled for January 23, 2025 at 1 pm.    Client explained he is being represented by the  during court hearings.    Client also reports has cracked tooth and in need of dental appointment.  Client asked CM for assistance coordinating dental appt due to his difficulty communicating.    CM agree to assist.  No other needs reported.

## 2024-12-30 ENCOUNTER — PATIENT OUTREACH (OUTPATIENT)
Dept: SURGERY | Facility: CLINIC | Age: 44
End: 2024-12-30

## 2024-12-30 ENCOUNTER — TELEPHONE (OUTPATIENT)
Dept: SURGERY | Facility: CLINIC | Age: 44
End: 2024-12-30

## 2024-12-30 NOTE — PROGRESS NOTES
Client called CM.    Client reports in need of dental follow up due to broken tooth and gum swelling.    Client has tried to contact dental clinic for appt but no success.     Client also asked for assistance obtaining Peridex med refill.     CM reminded Client of scheduled PCP and Psych appointment scheduled for next Monday (1/6/2025).  CM made emphasis on importance of adhering to Psych appt for medication management and housing application.  CM and Client agree to meet during PCP appt to assist with coordination of transport to Psych appointment.     CM sent message to PCP and Nurse via [employer] requesting med refill for Peridex mouth wash.    CM called [facility] [phone] and discussed Client's dental needs.  Emergency dental appt scheduled for tomorrow at 8:30 am located at [address] [place] [address], [address].    CM called Client and provided update.  Client agree to adhere to dental appointment and report outcome.

## 2024-12-30 NOTE — TELEPHONE ENCOUNTER
Spoke with Cindy from [facility] and inquired if there was any refills left on peridex solution but there is not.

## 2024-12-31 ENCOUNTER — PATIENT OUTREACH (OUTPATIENT)
Dept: SURGERY | Facility: CLINIC | Age: 44
End: 2024-12-31

## 2024-12-31 DIAGNOSIS — K08.9 POOR DENTITION: ICD-10-CM

## 2024-12-31 DIAGNOSIS — K06.1 GUM HYPERPLASIA: ICD-10-CM

## 2024-12-31 RX ORDER — CHLORHEXIDINE GLUCONATE ORAL RINSE 1.2 MG/ML
15 SOLUTION DENTAL 2 TIMES DAILY
Qty: 473 ML | Refills: 2 | Status: SHIPPED | OUTPATIENT
Start: 2024-12-31

## 2024-12-31 NOTE — PSYCH
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 (H) 82 - 98 fL    MCH 33.6 26.8 - 34.3 pg    MCHC 33.9 31.4 - 37.4 g/dL    RDW 13.5 11.6 - 15.1 %    MPV 10.6 8.9 - 12.7 fL    Platelets 171 149 - 390 Thousands/uL    nRBC 0 /100 WBCs    Segmented % 64 43 - 75 %    Immature Grans % 0 0 - 2 %    Lymphocytes % 21 14 - 44 %    Monocytes % 9 4 - 12 %    Eosinophils Relative 5 0 - 6 %    Basophils Relative 1 0 - 1 %    Absolute Neutrophils 3.06 1.85 - 7.62 Thousands/µL    Absolute Immature Grans 0.00 0.00 - 0.20 Thousand/uL    Absolute Lymphocytes 1.00 0.60 - 4.47 Thousands/µL    Absolute Monocytes 0.44 0.17 - 1.22 Thousand/µL    Eosinophils Absolute 0.23 0.00 - 0.61 Thousand/µL    Basophils Absolute 0.04 0.00 - 0.10 Thousands/µL   T-helper cells (CD4) count    Collection Time: 09/10/24  9:01 AM   Result Value Ref Range    CD4  (L) 359 - 1519 /uL    CD4 % [place] T Cell 13.4 (L) 30.8 - 58.5 %    White Blood Cell Count 4.8 3.4 - 10.8 x10E3/uL    Red Blood Cell Count 3.50 (L) 4.14 - 5.80 x10E6/uL    Hemoglobin 11.6 (L) 13.0 - 17.7 g/dL    HCT 35.1 (L) 37.5 - 51.0 %     (H) 79 - 97 fL    MCH 33.1 (H) 26.6 - 33.0 pg    MCHC 33.0 31.5 - 35.7 g/dL    RDW 13.3 11.6 - 15.4 %    Platelet Count  208 150 - 450 x10E3/uL    Neutrophils 63 Not Estab. %    Lymphocytes 20 Not Estab. %    Monocytes 11 Not Estab. %    Eosinophils 5 Not Estab. %    Basophils PCT 1 Not Estab. %    Neutrophils (Absolute) 3.1 1.4 - 7.0 x10E3/uL    Lymphocytes (Absolute) 0.9 0.7 - 3.1 x10E3/uL    Monocytes (Absolute) 0.5 0.1 - 0.9 x10E3/uL    Eosinophils (Absolute) 0.2 0.0 - 0.4 x10E3/uL    Basophils ABS 0.0 0.0 - 0.2 x10E3/uL    Immature Granulocytes (Absolute) 0.0 0.0 - 0.1 x10E3/uL    Immature Granulocytes 0 Not Estab. %   Comprehensive metabolic panel    Collection Time: 09/10/24  9:01 AM   Result Value Ref Range    Sodium 139 135 - 147 mmol/L    Potassium 4.2 3.5 - 5.3 mmol/L    Chloride 101 96 - 108 mmol/L    CO2 29 21 - 32 mmol/L    ANION GAP 9 4 - 13 mmol/L    BUN 12 5 - 25 mg/dL    Creatinine 0.95 0.60 - 1.30 mg/dL    Glucose, Fasting 75 65 - 99 mg/dL    Calcium 9.8 8.4 - 10.2 mg/dL    AST 20 13 - 39 U/L    ALT 14 7 - 52 U/L    Alkaline Phosphatase 78 34 - 104 U/L    Total Protein 7.7 6.4 - 8.4 g/dL    Albumin 4.4 3.5 - 5.0 g/dL    Total Bilirubin 0.39 0.20 - 1.00 mg/dL    eGFR 97 ml/min/1.73sq m   HIV-1 RNA, Quantitative PCR, [facility]    Collection Time: 09/10/24  9:01 AM    Specimen: Arm, Right; Blood   Result Value Ref Range    HIV-1 TARGET Detected <20 cp/mL (A) Not Detected   UA w Reflex to Microscopic w Reflex to Culture    Collection Time: 09/10/24  9:01 AM    Specimen: Urine, Clean Catch   Result Value Ref Range    Color, UA Light Yellow     Clarity, UA Clear     Specific Gravity, UA 1.013 1.003 - 1.030    pH, UA 7.0 4.5, 5.0, 5.5, 6.0, 6.5, 7.0, 7.5, 8.0    Leukocytes, UA Negative Negative    Nitrite, UA Negative Negative    Protein, UA Negative Negative mg/dl    Glucose, UA Negative Negative mg/dl    Ketones, UA Negative Negative mg/dl    Urobilinogen, UA <2.0 <2.0 mg/dl mg/dl    Bilirubin, UA Negative Negative    Occult Blood, UA Negative Negative   Hemoglobin A1C    Collection Time: 09/10/24  9:01 AM   Result Value Ref  Range    Hemoglobin A1C 5.9 (H) Normal 4.0-5.6%; PreDiabetic 5.7-6.4%; Diabetic >=6.5%; Glycemic control for adults with diabetes <7.0% %     mg/dl   PSA Total (Reflex To Free)    Collection Time: 09/10/24  9:01 AM   Result Value Ref Range    Miscellaneous Lab Test Result PSA Total (Reflex To Free)    RPR Treatment Monitoring    Collection Time: 09/10/24  9:01 AM   Result Value Ref Range    RPR Reactive (A) Non-Reactive   RPR Titer (Reflex Only-Do Not Order)    Collection Time: 09/10/24  9:01 AM   Result Value Ref Range    RPR TITER Reactive 2 dils (A) Non-Reactive       Most Recent Labs:   Lab Results   Component Value Date    WBC 4.77 09/10/2024    WBC 4.8 09/10/2024    RBC 4.32 09/10/2024    RBC 3.50 (L) 09/10/2024    HGB 14.5 09/10/2024    HGB 11.6 (L) 09/10/2024    HCT 42.8 09/10/2024    HCT 35.1 (L) 09/10/2024     09/10/2024     09/10/2024    RDW 13.5 09/10/2024    RDW 13.3 09/10/2024    NEUTROABS 3.06 09/10/2024    NEUTROABS 3.1 09/10/2024    SODIUM 139 09/10/2024    K 4.2 09/10/2024     09/10/2024    CO2 29 09/10/2024    BUN 12 09/10/2024    CREATININE 0.95 09/10/2024    GLUC 85 02/25/2019    GLUF 75 09/10/2024    CALCIUM 9.8 09/10/2024    AST 20 09/10/2024    ALT 14 09/10/2024    ALKPHOS 78 09/10/2024    TP 7.7 09/10/2024    ALB 4.4 09/10/2024    TBILI 0.39 09/10/2024    CHOLESTEROL 149 01/29/2024    HDL 53 01/29/2024    TRIG 85 01/29/2024    LDLCALC 79 01/29/2024    NONHDLC 96 01/29/2024    EOH5NQOMFHGE 1.420 08/01/2022    RPR Reactive (A) 09/10/2024    RPR Reactive 2 dils (A) 09/10/2024    HGBA1C 5.9 (H) 09/10/2024     09/10/2024     RPR:   Lab Results   Component Value Date    RPR Reactive (A) 09/10/2024    RPR Reactive 2 dils (A) 09/10/2024       Hepatitis Panel:   Lab Results   Component Value Date    HAV Reactive (A) 06/15/2021    HEPBSAG Non-reactive 06/15/2021    HEPCAB Non-reactive 01/29/2024     HIV Tests:   Lab Results   Component Value Date    HIVAGAB Reactive  (A) 06/15/2021       Urinalysis   Lab Results   Component Value Date    COLORU Light Yellow 09/10/2024    CLARITYU Clear 09/10/2024    SPECGRAV 1.013 09/10/2024    PHUR 7.0 09/10/2024    LEUKOCYTESUR Negative 09/10/2024    NITRITE Negative 09/10/2024    PROTEIN UA Negative 09/10/2024    GLUCOSEU Negative 09/10/2024    KETONESU Negative 09/10/2024    UROBILINOGEN <2.0 09/10/2024    BILIRUBINUR Negative 09/10/2024    BLOODU Negative 09/10/2024    RBCUA None Seen 01/29/2024    WBCUA None Seen 01/29/2024    EPIS None Seen 01/29/2024    BACTERIA None Seen 01/29/2024       Assessment/Plan:     Diagnoses and all orders for this visit:    Severe episode of recurrent major depressive disorder, without psychotic features (HCC)  -     hydrOXYzine HCL (ATARAX) 25 mg tablet; Take 1 tab po qd-bid prn anxiety  (Uzbek instructions if able, but his mom can read English)  -     Lipid panel; Future  -     sertraline (Zoloft) 50 mg tablet; Take (1/2) tab po qd x 1 week then (1) full tab x 1 week, then (1 1/2) tabs qd x 1 week, then (2) tabs qd  -     ECG 12 lead; Future    PTSD (post-traumatic stress disorder)  -     hydrOXYzine HCL (ATARAX) 25 mg tablet; Take 1 tab po qd-bid prn anxiety  (Uzbek instructions if able, but his mom can read English)  -     Lipid panel; Future  -     sertraline (Zoloft) 50 mg tablet; Take (1/2) tab po qd x 1 week then (1) full tab x 1 week, then (1 1/2) tabs qd x 1 week, then (2) tabs qd  -     ECG 12 lead; Future    Anxiety  -     hydrOXYzine HCL (ATARAX) 25 mg tablet; Take 1 tab po qd-bid prn anxiety  (Uzbek instructions if able, but his mom can read English)  -     Lipid panel; Future  -     sertraline (Zoloft) 50 mg tablet; Take (1/2) tab po qd x 1 week then (1) full tab x 1 week, then (1 1/2) tabs qd x 1 week, then (2) tabs qd  -     ECG 12 lead; Future    Marijuana abuse  -     hydrOXYzine HCL (ATARAX) 25 mg tablet; Take 1 tab po qd-bid prn anxiety  (Uzbek instructions if able, but his mom  can read English)  -     ECG 12 lead; Future    Illiterate          Risks/Benefits/Precautions:      Risks, Benefits And Possible Side Effects Of Medications:    Risks, benefits, and possible side effects of medications explained to Millie and he verbalizes understanding and agreement for treatment.    Controlled Medication Discussion:     No recent records found for controlled prescriptions according to [place] Prescription Drug Monitoring Program    Mae Nelson PA-C    Visit Time    Visit Start Time: 1:30PM  Visit Stop Time: 4:05PM  Total Visit Duration:  Problems with [employer]-- losing sound repeatedly.   As above minutes many interruptions due to loss of sound during [employer] interpretation

## 2025-01-02 ENCOUNTER — PATIENT OUTREACH (OUTPATIENT)
Dept: SURGERY | Facility: CLINIC | Age: 45
End: 2025-01-02

## 2025-01-06 ENCOUNTER — TREATMENT (OUTPATIENT)
Dept: SURGERY | Facility: CLINIC | Age: 45
End: 2025-01-06

## 2025-01-06 ENCOUNTER — OFFICE VISIT (OUTPATIENT)
Dept: SURGERY | Facility: CLINIC | Age: 45
End: 2025-01-06
Payer: COMMERCIAL

## 2025-01-06 ENCOUNTER — PATIENT OUTREACH (OUTPATIENT)
Dept: SURGERY | Facility: CLINIC | Age: 45
End: 2025-01-06

## 2025-01-06 ENCOUNTER — OFFICE VISIT (OUTPATIENT)
Dept: PSYCHIATRY | Facility: CLINIC | Age: 45
End: 2025-01-06
Payer: COMMERCIAL

## 2025-01-06 VITALS
DIASTOLIC BLOOD PRESSURE: 64 MMHG | HEART RATE: 83 BPM | WEIGHT: 187.2 LBS | HEIGHT: 75 IN | SYSTOLIC BLOOD PRESSURE: 135 MMHG | TEMPERATURE: 98 F | BODY MASS INDEX: 23.28 KG/M2 | RESPIRATION RATE: 17 BRPM | OXYGEN SATURATION: 100 %

## 2025-01-06 VITALS — WEIGHT: 183.1 LBS | HEIGHT: 75 IN | BODY MASS INDEX: 22.77 KG/M2

## 2025-01-06 DIAGNOSIS — T65.222D TOXIC EFFECT OF TOBACCO CIGARETTE, INTENTIONAL SELF-HARM, SUBSEQUENT ENCOUNTER: ICD-10-CM

## 2025-01-06 DIAGNOSIS — F33.2 SEVERE EPISODE OF RECURRENT MAJOR DEPRESSIVE DISORDER, WITHOUT PSYCHOTIC FEATURES (HCC): Primary | ICD-10-CM

## 2025-01-06 DIAGNOSIS — K08.9 POOR DENTITION: ICD-10-CM

## 2025-01-06 DIAGNOSIS — F43.10 PTSD (POST-TRAUMATIC STRESS DISORDER): ICD-10-CM

## 2025-01-06 DIAGNOSIS — F12.10 MARIJUANA ABUSE: ICD-10-CM

## 2025-01-06 DIAGNOSIS — R73.03 PREDIABETES: ICD-10-CM

## 2025-01-06 DIAGNOSIS — B20 CURRENTLY ASYMPTOMATIC HIV INFECTION, WITH HISTORY OF HIV-RELATED ILLNESS (HCC): Primary | ICD-10-CM

## 2025-01-06 DIAGNOSIS — F32.A DEPRESSION, UNSPECIFIED DEPRESSION TYPE: Primary | ICD-10-CM

## 2025-01-06 DIAGNOSIS — A53.9 SYPHILIS: ICD-10-CM

## 2025-01-06 DIAGNOSIS — Z55.0 ILLITERATE: ICD-10-CM

## 2025-01-06 DIAGNOSIS — F33.2 SEVERE EPISODE OF RECURRENT MAJOR DEPRESSIVE DISORDER, WITHOUT PSYCHOTIC FEATURES (HCC): ICD-10-CM

## 2025-01-06 DIAGNOSIS — F41.9 ANXIETY: ICD-10-CM

## 2025-01-06 PROCEDURE — 99214 OFFICE O/P EST MOD 30 MIN: CPT | Performed by: NURSE PRACTITIONER

## 2025-01-06 PROCEDURE — 99214 OFFICE O/P EST MOD 30 MIN: CPT | Performed by: PHYSICIAN ASSISTANT

## 2025-01-06 RX ORDER — HYDROXYZINE HYDROCHLORIDE 25 MG/1
TABLET, FILM COATED ORAL
Qty: 60 TABLET | Refills: 1 | Status: SHIPPED | OUTPATIENT
Start: 2025-01-06

## 2025-01-06 SDOH — EDUCATIONAL SECURITY - EDUCATION ATTAINMENT: ILITERACY AND LOW LEVEL LITERACY: Z55.0

## 2025-01-06 NOTE — ASSESSMENT & PLAN NOTE
Orders:    hydrOXYzine HCL (ATARAX) 25 mg tablet; Take 1 tab po qd-bid prn anxiety  (Faroese instructions if able, but his mom can read English)    ECG 12 lead; Future

## 2025-01-06 NOTE — PROGRESS NOTES
"Assessment/Plan: Pt was contacted by S at 1:00 p.m. to assess the depression screening. During today's contact, pt disclosed being stable with no pressing issues to address. Pt proceeded to share that he was heading to his appt with selected psychiatrist, along with describing addressing his immediate social needs (living placement and other sources of personal assistance). It was explored pt's emotional, cognitive, and behavioral health management regarding sustained mood dysregulations. Pt shared that he tends to keep himself \"busy and distracted\" to avoid getting consumed by severe depressive and anxiety traits, along with describing himself as a \"warrior\" be stating: I need to keep moving forward.     Pt's emotions and cognitions were validated throughout contact, along with receiving positive reinforcements for his awareness, medical adherence, and positivism's applications. The depression screening was assessed within conversation with pt, scoring (PHQ-9=3) as a display of minimal depressive traits without SI or HI. At the end of contact, the importance of medical adherence was reinforced, along with encouraging pt to benefit from  services as needed which pt agreed upon.     Counts include [facility] beds at the [facility]     Today patient present with No chief complaint on file.    Patient would likely benefit from: Ongoing psychiatric services with sustained MM Tx; Smoking cessation counseling and sources.   Consider/focus/continue: Monitoring pt's emotional, cognitive, and behavioral health's stability.   Stage of change: Pre-contemplation  Plan/ Behavioral Recommendations: Ongoing  interventions per pt's coordinated care, and/or pt's request of services.       There are no diagnoses linked to this encounter.      Subjective:     Patient ID: Millie Lloyd is a 44 y.o. male.    HPI    History of Present Illness:      Patient is being seen for annual behavioral health assessment.   Patient reports their behavioral health " concerns are stable.    [unfilled]       Review of Systems      Objective:     Physical Exam      [facility]    Orientation     Person: yes    Place: yes    Time: yes    Appearance    Well Developed: yes healthy    Uncomfortable: no    Normal Body Odor: no    Smells of Feces: no    Smells of Urine: no    Disheveled: no    Well Nourished: yes weight WNL of ideal    Grooming Unkempt: no    Poor Eye Contact: yes    Hirsute: no    Looks Tired: no    Acutely Exhausted: noappearance reflects stated age    Mood and Affect:     Appropriate: yes    Euthymic: yes    Irritable: no    Angry: no    Anxious: no    Depressed:no    Blunted:no    Labile: no    Restricted: no    Harm to Self or Others: None reported by pt.     Substance Abuse: Tobacco Use Disorder, Severe; Cannabis Use Disorder, Severe.

## 2025-01-06 NOTE — ASSESSMENT & PLAN NOTE
Continues to smoke and is not ready to quit at this time.   Continue to stress the importance of working toward smoking cessation  Nicotine Dependency - Primary    Counseled for greater than 15 minutes on the importance of smoking cessation.  Education was given regarding the cardiovascular effects of how nicotine affects the heart, lungs, kidneys, and peripheral vascular system.  Referred to [facility] for enrollment in smoking cessation program.

## 2025-01-06 NOTE — PROGRESS NOTES
Name: Millie Lloyd      : 1980      MRN: [id number]  Encounter Provider: ANNA Arreola  Encounter Date: 2025   Encounter department: [facility]  :  Assessment & Plan  Currently asymptomatic HIV infection, with history of HIV-related illness (HCC)  Doing well on Descovy and Tivicay with an undetectable VL.  Pt reports 100% medication compliance on HAART.  Stressed the importance of 100% medication adherence  Monitor CD4. HIV RNA, CMP, and CBCD to monitor for any developing virologic response, treatment failure, or drug toxicities  Follow up with Dr. Parkinson or Dr. Billy   HIV Counseling:    Viral Load: < 20    CD4 Count: 121      Denies side effects.  Stressed the importance of adherence.  Continue follow up in the ID clinic with Dr. Parkinson or Dr. Billy.    Reviewed the most recent labs, including the CD4 and viral load.  Discussed the risks and benefits of treatment options, instructions for management, importance of treatment adherence, and reduction of risk factors.  Educated on possible medication side effects.    Counseled on routes of HIV transmission, including the risk of  infection.  Emphasized that viral suppression is the best method to prevent HIV transmission.  At this time the pt denies the need for HIV testing of anyone in their life.    Total encounter time was greater than 35 minutes.  Greater than 20 minutes were spent on counseling and patient education.  Pt voices understanding and agreement with treatment plan.             Severe episode of recurrent major depressive disorder, without psychotic features (HCC)         Toxic effect of tobacco cigarette, intentional self-harm, subsequent encounter  Continues to smoke and is not ready to quit at this time.   Continue to stress the importance of working toward smoking cessation  Nicotine Dependency - Primary    Counseled for greater than 15 minutes on the importance of smoking cessation.   Education was given regarding the cardiovascular effects of how nicotine affects the heart, lungs, kidneys, and peripheral vascular system.  Referred to [facility] for enrollment in smoking cessation program.            Syphilis  Doing well.  Remains serofast at 1:2 dils.  Continue to check RPR  Denies have any recent sexual relations          Prediabetes  Doing well on Jardiance.  Continue Jardiance 10 mg QD  A1C 5.9  Recheck A1C in 6 months  Follow with [facility] dietician         Poor dentition  He did not show for dental appt last week due to not feeling well.  Left upper tooth fell out after eating.  Stress the importance of following up with dental and not cancelling no showing appt.          History of Present Illness   HPI  Millie Lloyd is a 44 y.o. male who presents for follow up visit and for management of HIV.  He has been doing good.  He was sick with a cold and was taking something OTC and is feeling better.  He hangs at home.   He is not work as of yet. He was dental pain and left upper front tooth fell out when he was eating something. He is remaining humble and does not have girlfriend of partner.  He does not want any problems.  He has his first appt with psychiatry today.  He feels his depression and anxiety are doing ok today.     He does not endorse any fever, chills, nausea, vomiting, cough, SOB, diarrhea, or night sweats.    History obtained from: patient    Review of Systems   Constitutional: Negative.    HENT: Negative.     Respiratory: Negative.     Cardiovascular: Negative.    Gastrointestinal: Negative.    Neurological: Negative.    Psychiatric/Behavioral: Negative.       Current Outpatient Medications on File Prior to Visit   Medication Sig Dispense Refill    albuterol (Ventolin HFA) 90 mcg/act inhaler Inhale 2 puffs every 6 (six) hours as needed for wheezing 18 g 2    chlorhexidine (PERIDEX) 0.12 % solution Apply 15 mL to the mouth or throat 2 (two) times a day 473 mL 2    Cholecalciferol  "(Vitamin D3) 125 MCG (5000 UT) CAPS TAKE 1 CAPSULE BY MOUTH DAILY 90 capsule 1    dolutegravir (Tivicay) 50 MG TABS TAKE 1 TABLET BY MOUTH IN THE MORNING 90 tablet 1    Empagliflozin (Jardiance) 10 MG TABS tablet TAKE 1 TABLET BY MOUTH IN THE MORNING 90 tablet 1    emtricitabine-tenofovir AF (Descovy) 200-25 MG tablet TAKE 1 TABLET BY MOUTH IN THE MORNING 90 tablet 1    mometasone-formoterol (Dulera) 100-5 MCG/ACT inhaler Inhale 2 puffs 2 (two) times a day Rinse mouth after use. 13 g 5    Multiple Vitamin (Tab-A-María) TABS TAKE 1 TABLET BY MOUTH EVERY MORNING 90 tablet 1    nicotine (NICODERM CQ) 7 mg/24hr TD 24 hr patch Place 1 patch on the skin over 24 hours every 24 hours 28 patch 5    acetaminophen (TYLENOL) 650 mg CR tablet Take 1 tablet (650 mg total) by mouth every 8 (eight) hours as needed for mild pain (Patient not taking: Reported on 1/6/2025) 30 tablet 0     No current facility-administered medications on file prior to visit.         Objective   /64 (BP Location: Right arm, Patient Position: Sitting, Cuff Size: Large)   Pulse 83   Temp 98 °F (36.7 °C) (Tympanic)   Resp 17   Ht 6' 3\" (1.905 m)   Wt 84.9 kg (187 lb 3.2 oz)   SpO2 100%   BMI 23.40 kg/m²      Physical Exam  Vitals and nursing note reviewed.   Constitutional:       Appearance: Normal appearance.   HENT:      Right Ear: Tympanic membrane normal.      Left Ear: Tympanic membrane normal.      Nose: Nose normal.      Mouth/Throat:      Mouth: Mucous membranes are moist.      Pharynx: Oropharynx is clear.     Cardiovascular:      Rate and Rhythm: Normal rate and regular rhythm.      Chest Wall: PMI is not displaced.      Pulses: Normal pulses.      Heart sounds: Normal heart sounds.   Abdominal:      General: Bowel sounds are normal.      Palpations: Abdomen is soft.   Lymphadenopathy:      Cervical: No cervical adenopathy.   Skin:     General: Skin is warm and dry.      Capillary Refill: Capillary refill takes less than 2 seconds. "   Neurological:      Mental Status: He is alert and oriented to person, place, and time.   Psychiatric:         Mood and Affect: Mood normal.         Behavior: Behavior normal.         Thought Content: Thought content normal.         Judgment: Judgment normal.

## 2025-01-06 NOTE — ASSESSMENT & PLAN NOTE
Doing well on Descovy and Tivicay with an undetectable VL.  Pt reports 100% medication compliance on HAART.  Stressed the importance of 100% medication adherence  Monitor CD4. HIV RNA, CMP, and CBCD to monitor for any developing virologic response, treatment failure, or drug toxicities  Follow up with Dr. Parkinson or Dr. Billy   HIV Counseling:    Viral Load: < 20    CD4 Count: 121      Denies side effects.  Stressed the importance of adherence.  Continue follow up in the ID clinic with Dr. Parkinson or Dr. Billy.    Reviewed the most recent labs, including the CD4 and viral load.  Discussed the risks and benefits of treatment options, instructions for management, importance of treatment adherence, and reduction of risk factors.  Educated on possible medication side effects.    Counseled on routes of HIV transmission, including the risk of  infection.  Emphasized that viral suppression is the best method to prevent HIV transmission.  At this time the pt denies the need for HIV testing of anyone in their life.    Total encounter time was greater than 35 minutes.  Greater than 20 minutes were spent on counseling and patient education.  Pt voices understanding and agreement with treatment plan.

## 2025-01-06 NOTE — ASSESSMENT & PLAN NOTE
Orders:    hydrOXYzine HCL (ATARAX) 25 mg tablet; Take 1 tab po qd-bid prn anxiety  (Mongolian instructions if able, but his mom can read English)    Lipid panel; Future    sertraline (Zoloft) 50 mg tablet; Take (1/2) tab po qd x 1 week then (1) full tab x 1 week, then (1 1/2) tabs qd x 1 week, then (2) tabs qd    ECG 12 lead; Future

## 2025-01-06 NOTE — ASSESSMENT & PLAN NOTE
Orders:    hydrOXYzine HCL (ATARAX) 25 mg tablet; Take 1 tab po qd-bid prn anxiety  (Occitan instructions if able, but his mom can read English)    Lipid panel; Future    sertraline (Zoloft) 50 mg tablet; Take (1/2) tab po qd x 1 week then (1) full tab x 1 week, then (1 1/2) tabs qd x 1 week, then (2) tabs qd    ECG 12 lead; Future

## 2025-01-06 NOTE — ASSESSMENT & PLAN NOTE
Doing well.  Remains serofast at 1:2 dils.  Continue to check RPR  Denies have any recent sexual relations

## 2025-01-06 NOTE — ASSESSMENT & PLAN NOTE
Orders:    hydrOXYzine HCL (ATARAX) 25 mg tablet; Take 1 tab po qd-bid prn anxiety  (Kinyarwanda instructions if able, but his mom can read English)    Lipid panel; Future    sertraline (Zoloft) 50 mg tablet; Take (1/2) tab po qd x 1 week then (1) full tab x 1 week, then (1 1/2) tabs qd x 1 week, then (2) tabs qd    ECG 12 lead; Future

## 2025-01-06 NOTE — ASSESSMENT & PLAN NOTE
He did not show for dental appt last week due to not feeling well.  Left upper tooth fell out after eating.  Stress the importance of following up with dental and not cancelling no showing appt.

## 2025-01-07 NOTE — PROGRESS NOTES
Client called CM.    Client reports dental pain/swelling.     CM encouraged Client to contact [facility] for an appointment.    CM also informed Client of dental clinic emergency walk-ins during morning hours at 8:30 am.    Client agree to contact dental clinic to discuss dental needs.

## 2025-01-08 ENCOUNTER — PATIENT OUTREACH (OUTPATIENT)
Dept: SURGERY | Facility: CLINIC | Age: 45
End: 2025-01-08

## 2025-01-08 NOTE — PROGRESS NOTES
CM case conference with PCP re: med refills for oral mouth wash.    PCP reports placed order via [employer] and sent to [facility] pharmacy.     CM called Client to follow up with dental emergency appointment scheduled for today at 8:30 am.    Client reports did not attend appointment because he oversleep due to pain medication he took last night.    CM encouraged Client to contact dental clinic to schedule new appointment. Client agree to do so.    CM provided Client with update on med refills sent to [facility] pharmacy.    Client pleased with assistance.

## 2025-01-14 ENCOUNTER — PATIENT OUTREACH (OUTPATIENT)
Dept: SURGERY | Facility: CLINIC | Age: 45
End: 2025-01-14

## 2025-01-14 NOTE — PROGRESS NOTES
CM met with Client.    CM assisted Client complete snap/medicaid recert form.  Client provided with directions on how to mail form. Client verbalized understanding, agree to mail form.    Client reports in need of assistance contacting  to follow up with case status.    CM called  (Dottie Crenshaw: [phone]) and discussed case.   reported Client's next hearing is scheduled for Thursday, January 23, at 9 am. Client is required to pay $179.90 in restitution fees.  Once restitution is paid case will be dismissed.    CM provided update to Client.  Client verbalized understanding and agree to pay restitution during his court hearing appointment.    Psych referral discussed.  Client reports adherence to Psych appt on 1/6/2025. Client completed psych intake and met with psych provider (Mae Nelson PA-C).  Client was prescribed anti-depressant and anxiety medication.   CM reviewed medication prescribed with Client and discussed how to take meds.Psych follow up appt scheduled for 2/12/2025 at 3:30 pm.  Housing status discussed.  CM agree to follow up with Psych provider once evaluation is complete, to obtain copy of evaluation and send it to housing program.     Client expressed frustration after receiving multiple calls from [employer] encouraging him to switch providers.Client also reports receiving [employer] flyers in the mail. Client told [employer] not to call him but continues to receive them occasionally.   No other needs reported.

## 2025-01-20 DIAGNOSIS — B20 SYMPTOMATIC HIV INFECTION (HCC): ICD-10-CM

## 2025-01-20 DIAGNOSIS — E55.9 VITAMIN D DEFICIENCY: ICD-10-CM

## 2025-01-20 RX ORDER — MULTIVITAMIN WITH FOLIC ACID 400 MCG
1 TABLET ORAL EVERY MORNING
Qty: 90 TABLET | Refills: 1 | Status: SHIPPED | OUTPATIENT
Start: 2025-01-20

## 2025-01-23 ENCOUNTER — PATIENT OUTREACH (OUTPATIENT)
Dept: SURGERY | Facility: CLINIC | Age: 45
End: 2025-01-23

## 2025-01-24 NOTE — PROGRESS NOTES
CM met with Client.    Client reports adherent to today's court hearing along with .  Client paid $50 fee and case was dismissed.    Client asked for food pantry referral.  CM referred Client to the [facility].    Psych referral status discussed.  CM agree to follow up with Psych and S for psych evaluation needed for housing referral.  No other needs reported.

## 2025-01-28 ENCOUNTER — PATIENT OUTREACH (OUTPATIENT)
Dept: SURGERY | Facility: CLINIC | Age: 45
End: 2025-01-28

## 2025-01-29 NOTE — PROGRESS NOTES
Client called CM.    Client asked CM for assistance with food pantry referral.    CM provided Client with information re: [facility].    Client pleased with information and agree to reach out to program.

## 2025-02-13 DIAGNOSIS — F41.9 ANXIETY: ICD-10-CM

## 2025-02-13 DIAGNOSIS — F33.2 SEVERE EPISODE OF RECURRENT MAJOR DEPRESSIVE DISORDER, WITHOUT PSYCHOTIC FEATURES (HCC): ICD-10-CM

## 2025-02-13 DIAGNOSIS — F43.10 PTSD (POST-TRAUMATIC STRESS DISORDER): ICD-10-CM

## 2025-02-14 NOTE — TELEPHONE ENCOUNTER
Called and left message for patient to return a call to [phone] and schedule  follow up with provider (PATTI Nelson ). Please schedule upon return call. Thank you.

## 2025-02-17 ENCOUNTER — PATIENT OUTREACH (OUTPATIENT)
Dept: SURGERY | Facility: CLINIC | Age: 45
End: 2025-02-17

## 2025-02-17 ENCOUNTER — TELEPHONE (OUTPATIENT)
Age: 45
End: 2025-02-17

## 2025-02-17 NOTE — TELEPHONE ENCOUNTER
Patient contacted the office to schedule a follow up visit with provider. Patient is now scheduled for 2/21/25  at 10am in office.

## 2025-02-18 NOTE — PROGRESS NOTES
CM met with Client.    Client reports received housing letter.  CM reviewed letter with Client.  CM informed Client he has been added to the wait list for the following buildings:  - [facility]  - [facility]  - [facility]  - [facility]    Psych treatment status discussed.  Client reports missed last psych appointment and in need of assistance coordinating new appointment.    [facility] called [facility] and coordinated appointment for 2/21/2025 at 10 am.    Client agree to adhere to appointment.     Client also asked CM for assistance completing State ID renewal application. CM assisted Client complete application and told about $42.5 fee.  Client agree to take application to [employer] and pay fee.  No other needs reported.

## 2025-02-19 NOTE — PROGRESS NOTES
CM called Client.    CM inquired about psych appt outcome.    Client reports adherence to psych appt, was prescribed anti-depression, anxiety medications.    Psych medication intake discussed.  Client reminded of psych follow up appt scheduled for 2/12/2025.   No other needs reported.

## 2025-02-19 NOTE — PROGRESS NOTES
CM met with Client and PCP during clinic visit.  CM served as .    Client's HIV, Psych and overall health status discussed.  Viral load count undetectable, CD4 count at 121.    Client continues to struggle with poor dentition.  Client does not adhere to dental appointment.  Client told he can visit dental clinic as a walk-in.    CM reminded of today's Psych appt and encouraged to attend since psych evaluation is needed for supportive housing application.    CM set up lyft for psych appointment.

## 2025-02-19 NOTE — PROGRESS NOTES
CM called Client to follow up with [facility].    Client reports has not visited the dental clinic but will do so later this week as a walk-in.     Client agree to keep CM up to date of outcome.

## 2025-02-24 ENCOUNTER — TELEPHONE (OUTPATIENT)
Dept: PSYCHIATRY | Facility: CLINIC | Age: 45
End: 2025-02-24

## 2025-02-24 NOTE — LETTER
25     Millie Lloyd   : 1980   [address] [place] [id number]       It is the policy of [facility] to monitor and manage appointments that have been no-showed or cancelled with less than 48-hour notice. This is necessary to ensure that we are able to provide timely access for all patients to our providers. Undue numbers of unutilized appointments delays necessary medical care for all patients.      Dear Millie Lloyd       We are sorry that you missed your appointment with Mae Nelson PA-C on 2025. It has been 1 month or more since your last appointment. Your health and follow-up care are important to us. We want to make you aware that you do not have another appointment with Mae Nelson PA-C scheduled.    Please be aware that our office policy states that if you 'no show' two or more Medication Management  appointments without prior notice of cancellation within in a calendar year, you may be discharged from Medication Management  treatment.  We want to bring this to your attention now to prevent an interruption of your care.  If you have any questions about this policy, please call us at the number above.     If we do not hear from you within 10 business days to make a follow up appointment, we will assume you are no longer interested in care here.    Thank you in advance for your cooperation and assistance.       Sincerely,      [facility] Support Staff

## 2025-02-24 NOTE — TELEPHONE ENCOUNTER
NO-SHOW LETTER MAILED TO Millie Lloyd.  ADDRESS: [address] [place] [id number]  SENT VIA [employer]

## 2025-02-25 DIAGNOSIS — F43.10 PTSD (POST-TRAUMATIC STRESS DISORDER): ICD-10-CM

## 2025-02-25 DIAGNOSIS — F12.10 MARIJUANA ABUSE: ICD-10-CM

## 2025-02-25 DIAGNOSIS — B20 SYMPTOMATIC HIV INFECTION (HCC): ICD-10-CM

## 2025-02-25 DIAGNOSIS — F41.9 ANXIETY: ICD-10-CM

## 2025-02-25 DIAGNOSIS — B20 AIDS (ACQUIRED IMMUNE DEFICIENCY SYNDROME) (HCC): ICD-10-CM

## 2025-02-25 DIAGNOSIS — F33.2 SEVERE EPISODE OF RECURRENT MAJOR DEPRESSIVE DISORDER, WITHOUT PSYCHOTIC FEATURES (HCC): ICD-10-CM

## 2025-02-25 RX ORDER — HYDROXYZINE HYDROCHLORIDE 25 MG/1
TABLET, FILM COATED ORAL
Qty: 60 TABLET | Refills: 1 | Status: SHIPPED | OUTPATIENT
Start: 2025-02-25

## 2025-02-25 RX ORDER — EMTRICITABINE AND TENOFOVIR ALAFENAMIDE 200; 25 MG/1; MG/1
1 TABLET ORAL EVERY MORNING
Qty: 30 TABLET | Refills: 5 | Status: SHIPPED | OUTPATIENT
Start: 2025-02-25

## 2025-02-25 RX ORDER — DOLUTEGRAVIR SODIUM 50 MG/1
50 TABLET, FILM COATED ORAL EVERY MORNING
Qty: 30 TABLET | Refills: 5 | Status: SHIPPED | OUTPATIENT
Start: 2025-02-25

## 2025-03-02 NOTE — PROGRESS NOTES
Ambulatory Visit  Name: Millie Lloyd      : 1980      MRN: [id number]  Encounter Provider: ANNA Arreola  Encounter Date: 2024   Encounter department: [facility]    Assessment & Plan   1. Currently asymptomatic HIV infection, with history of HIV-related illness (HCC)  Assessment & Plan:  Doing well on Descovy and Tivicay with an undetectable VL.  Pt reports 100% medication compliance on HAART.  Stressed the importance of 100% medication adherence  Monitor CD4. HIV RNA, CMP, and CBCD for virologic response and drug toxicities  Follow up with Dr. Parkinson or Dr. Billy   HIV Counseling:    Viral Load:  not detected     CD4 Count: 116      Denies side effects.  Stressed the importance of adherence.  Continue follow up in the ID clinic with Dr. Parkinson or Dr. Billy.    Reviewed the most recent labs, including the CD4 and viral load.  Discussed the risks and benefits of treatment options, instructions for management, importance of treatment adherence, and reduction of risk factors.  Educated on possible medication side effects.    Counseled on routes of HIV transmission, including the risk of  infection.  Emphasized that viral suppression is the best method to prevent HIV transmission.  At this time the pt denies the need for HIV testing of anyone in their life.    Total encounter time was greater than 35 minutes.  Greater than 20 minutes were spent on counseling and patient education.  Pt voices understanding and agreement with treatment plan.      2. Severe episode of recurrent major depressive disorder, without psychotic features (HCC)  Assessment & Plan:  Last PHQ-9: 20.  Started sertraline 50 mg at HS.  Reports he is doing well on sertraline.  Continue to follow with VIJ[facility]  Evon for CBT  Continue Sertraline 50 mg HS  Denies any SI or HI's    3. Prediabetes  Assessment & Plan:  Taking Jardiance 10 mg QD.  Pt reports tolerating medication but does admit to have  "a few episodes of feeling dizzy.  Unclear if this in from hypoglycemia but he does eat something with sugar and dizziness resolves.   Decrease Jardiance to 5 mg for now  Look at ordering Dexcom sensor  Recheck A!C  Recommend eating meals at regular intervals and not skipping also recommend snack at HS  Orders:  -     Hemoglobin A1C; Future      History of Present Illness     Millie Lloyd is a 43 y.o. male who presents for 6 week follow up visit for management of HIV and chronic health care conditions.  Millie has been suffering from depression and was started on sertraline 50 mg at HS.  Millie reports he is good.  His appetite is based on how stressed he is feeling he reports access to food but not a lot. He is going for his medical marijuana.  He went to oral surgeon and has took out 4 teeth: 2 left upper molars, 1 left bottom molar, and 1 right bottom molar.  He is feeling good on the sertraline and does make him feels tired sometimes. Denies any SI or HI.  He is requesting new BP cuff. He started the Jardiance 10 mg and does feel dizzy at times. He is requesting glucometer.  He does eats something like candy and dizziness resolves.     He does not endorse any fever, chills, nausea, vomiting, cough, SOB, diarrhea, or night sweats.      Review of Systems   Constitutional: Negative.    HENT: Negative.     Respiratory: Negative.     Cardiovascular: Negative.    Gastrointestinal: Negative.    Neurological: Negative.    Psychiatric/Behavioral: Negative.           /60 (BP Location: Right arm, Patient Position: Sitting, Cuff Size: Standard)   Pulse 57   Temp (!) 97 °F (36.1 °C) (Tympanic)   Resp 17   Ht 6' 3\" (1.905 m)   Wt 79.8 kg (176 lb)   SpO2 100%   BMI 22.00 kg/m²     Physical Exam  Vitals and nursing note reviewed.   Constitutional:       Appearance: Normal appearance.   Cardiovascular:      Rate and Rhythm: Normal rate and regular rhythm.      Chest Wall: PMI is not displaced.      Pulses: " Normal pulses.      Heart sounds: Normal heart sounds.   Pulmonary:      Effort: Pulmonary effort is normal.      Breath sounds: Normal breath sounds.   Abdominal:      General: Bowel sounds are normal.      Palpations: Abdomen is soft.   Musculoskeletal:         General: Normal range of motion.   Skin:     General: Skin is warm and dry.      Capillary Refill: Capillary refill takes less than 2 seconds.   Neurological:      Mental Status: He is alert and oriented to person, place, and time.   Psychiatric:         Mood and Affect: Mood normal.         Behavior: Behavior normal.         Thought Content: Thought content normal.         Judgment: Judgment normal.                None
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
## 2025-03-21 DIAGNOSIS — R73.03 PREDIABETES: ICD-10-CM

## 2025-03-24 RX ORDER — EMPAGLIFLOZIN 10 MG/1
10 TABLET, FILM COATED ORAL EVERY MORNING
Qty: 90 TABLET | Refills: 1 | Status: SHIPPED | OUTPATIENT
Start: 2025-03-24

## 2025-03-27 DIAGNOSIS — K08.9 POOR DENTITION: ICD-10-CM

## 2025-03-27 DIAGNOSIS — K06.1 GUM HYPERPLASIA: ICD-10-CM

## 2025-03-27 RX ORDER — CHLORHEXIDINE GLUCONATE ORAL RINSE 1.2 MG/ML
SOLUTION DENTAL
Qty: 473 ML | Refills: 2 | Status: SHIPPED | OUTPATIENT
Start: 2025-03-27
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
## 2025-03-27 NOTE — PROGRESS NOTES
CM called OVR worker.    CM case conference with OVR worker re: Client's mental health status and needs for assistance for job training, employment and support services.
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
## 2025-03-28 NOTE — PROGRESS NOTES
Client called CM.    Client asked CM for information and referral re: clothing.    CM provided Client with information on [facility].    Client agree to visit programs.

## 2025-03-28 NOTE — PROGRESS NOTES
CM called Client to follow up with SSI case.    Client reports has not received any updates on case.    Client also expressed concern over medical bills from [facility].  CM asked Client to bring such bills for review.  Client agreed to drop off letters.

## 2025-04-03 DIAGNOSIS — F43.10 PTSD (POST-TRAUMATIC STRESS DISORDER): ICD-10-CM

## 2025-04-03 DIAGNOSIS — F33.2 SEVERE EPISODE OF RECURRENT MAJOR DEPRESSIVE DISORDER, WITHOUT PSYCHOTIC FEATURES (HCC): ICD-10-CM

## 2025-04-03 DIAGNOSIS — F41.9 ANXIETY: ICD-10-CM

## 2025-04-09 ENCOUNTER — PATIENT OUTREACH (OUTPATIENT)
Dept: SURGERY | Facility: CLINIC | Age: 45
End: 2025-04-09

## 2025-04-10 NOTE — PROGRESS NOTES
CM called Client.     CM reminded Client of need to complete mcm annual recert.    CM asked Client for copy of latest social security benefit letter.    Client agree to stop by tomorrow and provide copy of letter.

## 2025-04-11 ENCOUNTER — PATIENT OUTREACH (OUTPATIENT)
Dept: SURGERY | Facility: CLINIC | Age: 45
End: 2025-04-11

## 2025-04-11 NOTE — PROGRESS NOTES
CM called Client.     CM reminded Client of need to complete mcm annual recert and asked for proof of income information.     Client agree to bring copy of social security award letter.

## 2025-04-14 ENCOUNTER — PATIENT OUTREACH (OUTPATIENT)
Dept: SURGERY | Facility: CLINIC | Age: 45
End: 2025-04-14

## 2025-04-14 NOTE — PROGRESS NOTES
CM met with Client.    Client brought requested docs needed for annual mcm recert:  - State ID  - Insurance Cards  - Bank Statement (proof of income)    CM completed Client's annual recert:  - CM/Client Agreement  - Grievance Policy  - RW Payer of Last Resort  - RW Acuity Scale    CM updated Client's demographics information, completed Annual Review and dental referral section in [employer].

## 2025-04-15 ENCOUNTER — PATIENT OUTREACH (OUTPATIENT)
Dept: SURGERY | Facility: CLINIC | Age: 45
End: 2025-04-15

## 2025-04-15 NOTE — PROGRESS NOTES
CM met with Client.    CM assisted Client completed [facility] Sliding Fee Scales.    CM submitted SFS forms to Financial workers via email.

## 2025-04-17 ENCOUNTER — PATIENT OUTREACH (OUTPATIENT)
Dept: SURGERY | Facility: CLINIC | Age: 45
End: 2025-04-17

## 2025-04-18 ENCOUNTER — PATIENT OUTREACH (OUTPATIENT)
Dept: SURGERY | Facility: CLINIC | Age: 45
End: 2025-04-18

## 2025-04-18 NOTE — PROGRESS NOTES
CM case conference with [employer] workers re: Client's Sliding Fee Scale application status.    Financial worker reports bank statement received does not count as proof of income and instead asked for copy of SSA award letter in order to process SFS application.    CM agree to reach out to Client and request award letter.

## 2025-04-21 NOTE — PROGRESS NOTES
CM called Client.    CM informed Client that bank statement was rejected by financial worker re: SFS application and ask that he submit copy of social security statement of benefits.    Client agree to visit SSA office, obtain letter and provide it to this CM.

## 2025-04-22 ENCOUNTER — TELEPHONE (OUTPATIENT)
Dept: SURGERY | Facility: CLINIC | Age: 45
End: 2025-04-22

## 2025-04-23 ENCOUNTER — PATIENT OUTREACH (OUTPATIENT)
Dept: SURGERY | Facility: CLINIC | Age: 45
End: 2025-04-23

## 2025-04-23 ENCOUNTER — APPOINTMENT (OUTPATIENT)
Dept: LAB | Facility: CLINIC | Age: 45
End: 2025-04-23
Payer: MEDICARE

## 2025-04-23 DIAGNOSIS — Z11.1 SCREENING-PULMONARY TB: ICD-10-CM

## 2025-04-23 DIAGNOSIS — B20 CURRENTLY ASYMPTOMATIC HIV INFECTION, WITH HISTORY OF HIV-RELATED ILLNESS (HCC): ICD-10-CM

## 2025-04-23 DIAGNOSIS — D72.89 OTHER SPECIFIED DISORDERS OF WHITE BLOOD CELLS: ICD-10-CM

## 2025-04-23 DIAGNOSIS — F33.2 SEVERE EPISODE OF RECURRENT MAJOR DEPRESSIVE DISORDER, WITHOUT PSYCHOTIC FEATURES (HCC): ICD-10-CM

## 2025-04-23 DIAGNOSIS — F41.9 ANXIETY: ICD-10-CM

## 2025-04-23 DIAGNOSIS — Z72.89 OTHER PROBLEMS RELATED TO LIFESTYLE: ICD-10-CM

## 2025-04-23 DIAGNOSIS — Z20.2 CONTACT WITH AND (SUSPECTED) EXPOSURE TO INFECTIONS WITH A PREDOMINANTLY SEXUAL MODE OF TRANSMISSION: ICD-10-CM

## 2025-04-23 DIAGNOSIS — Z11.3 ENCOUNTER FOR SCREENING FOR BACTERIAL SEXUALLY TRANSMITTED DISEASE: ICD-10-CM

## 2025-04-23 DIAGNOSIS — R73.03 PREDIABETES: ICD-10-CM

## 2025-04-23 DIAGNOSIS — A53.9 SYPHILIS: ICD-10-CM

## 2025-04-23 DIAGNOSIS — Z13.220 ENCOUNTER FOR LIPID SCREENING FOR CARDIOVASCULAR DISEASE: ICD-10-CM

## 2025-04-23 DIAGNOSIS — Z13.6 ENCOUNTER FOR LIPID SCREENING FOR CARDIOVASCULAR DISEASE: ICD-10-CM

## 2025-04-23 DIAGNOSIS — F43.10 PTSD (POST-TRAUMATIC STRESS DISORDER): ICD-10-CM

## 2025-04-23 LAB
ALBUMIN SERPL BCG-MCNC: 4.5 G/DL (ref 3.5–5)
ALP SERPL-CCNC: 83 U/L (ref 34–104)
ALT SERPL W P-5'-P-CCNC: 11 U/L (ref 7–52)
ANION GAP SERPL CALCULATED.3IONS-SCNC: 7 MMOL/L (ref 4–13)
AST SERPL W P-5'-P-CCNC: 17 U/L (ref 13–39)
BACTERIA UR QL AUTO: NORMAL /HPF
BASOPHILS # BLD AUTO: 0.04 THOUSANDS/ÂΜL (ref 0–0.1)
BASOPHILS NFR BLD AUTO: 1 % (ref 0–1)
BILIRUB SERPL-MCNC: 0.36 MG/DL (ref 0.2–1)
BILIRUB UR QL STRIP: NEGATIVE
BUN SERPL-MCNC: 9 MG/DL (ref 5–25)
CALCIUM SERPL-MCNC: 10 MG/DL (ref 8.4–10.2)
CHLORIDE SERPL-SCNC: 102 MMOL/L (ref 96–108)
CHOLEST SERPL-MCNC: 175 MG/DL (ref ?–200)
CLARITY UR: ABNORMAL
CO2 SERPL-SCNC: 33 MMOL/L (ref 21–32)
COLOR UR: ABNORMAL
CREAT SERPL-MCNC: 0.99 MG/DL (ref 0.6–1.3)
EOSINOPHIL # BLD AUTO: 0.2 THOUSAND/ÂΜL (ref 0–0.61)
EOSINOPHIL NFR BLD AUTO: 4 % (ref 0–6)
ERYTHROCYTE [DISTWIDTH] IN BLOOD BY AUTOMATED COUNT: 13.1 % (ref 11.6–15.1)
EST. AVERAGE GLUCOSE BLD GHB EST-MCNC: 117 MG/DL
GFR SERPL CREATININE-BSD FRML MDRD: 92 ML/MIN/1.73SQ M
GLUCOSE P FAST SERPL-MCNC: 80 MG/DL (ref 65–99)
GLUCOSE UR STRIP-MCNC: ABNORMAL MG/DL
HBA1C MFR BLD: 5.7 %
HCT VFR BLD AUTO: 47.5 % (ref 36.5–49.3)
HDLC SERPL-MCNC: 62 MG/DL
HGB BLD-MCNC: 15.8 G/DL (ref 12–17)
HGB UR QL STRIP.AUTO: NEGATIVE
IMM GRANULOCYTES # BLD AUTO: 0.02 THOUSAND/UL (ref 0–0.2)
IMM GRANULOCYTES NFR BLD AUTO: 0 % (ref 0–2)
KETONES UR STRIP-MCNC: NEGATIVE MG/DL
LDLC SERPL CALC-MCNC: 92 MG/DL (ref 0–100)
LEUKOCYTE ESTERASE UR QL STRIP: ABNORMAL
LYMPHOCYTES # BLD AUTO: 1.2 THOUSANDS/ÂΜL (ref 0.6–4.47)
LYMPHOCYTES NFR BLD AUTO: 23 % (ref 14–44)
MCH RBC QN AUTO: 32.9 PG (ref 26.8–34.3)
MCHC RBC AUTO-ENTMCNC: 33.3 G/DL (ref 31.4–37.4)
MCV RBC AUTO: 99 FL (ref 82–98)
MONOCYTES # BLD AUTO: 0.65 THOUSAND/ÂΜL (ref 0.17–1.22)
MONOCYTES NFR BLD AUTO: 13 % (ref 4–12)
NEUTROPHILS # BLD AUTO: 3.06 THOUSANDS/ÂΜL (ref 1.85–7.62)
NEUTS SEG NFR BLD AUTO: 59 % (ref 43–75)
NITRITE UR QL STRIP: NEGATIVE
NON-SQ EPI CELLS URNS QL MICRO: NORMAL /HPF
NONHDLC SERPL-MCNC: 113 MG/DL
NRBC BLD AUTO-RTO: 0 /100 WBCS
PH UR STRIP.AUTO: 7 [PH]
PLATELET # BLD AUTO: 199 THOUSANDS/UL (ref 149–390)
PMV BLD AUTO: 10.2 FL (ref 8.9–12.7)
POTASSIUM SERPL-SCNC: 4.1 MMOL/L (ref 3.5–5.3)
PROT SERPL-MCNC: 7.7 G/DL (ref 6.4–8.4)
PROT UR STRIP-MCNC: NEGATIVE MG/DL
RBC # BLD AUTO: 4.8 MILLION/UL (ref 3.88–5.62)
RBC #/AREA URNS AUTO: NORMAL /HPF
SODIUM SERPL-SCNC: 142 MMOL/L (ref 135–147)
SP GR UR STRIP.AUTO: 1.02 (ref 1–1.03)
TRIGL SERPL-MCNC: 106 MG/DL (ref ?–150)
UROBILINOGEN UR STRIP-ACNC: <2 MG/DL
WBC # BLD AUTO: 5.17 THOUSAND/UL (ref 4.31–10.16)
WBC #/AREA URNS AUTO: NORMAL /HPF

## 2025-04-23 PROCEDURE — 81001 URINALYSIS AUTO W/SCOPE: CPT

## 2025-04-23 PROCEDURE — 86592 SYPHILIS TEST NON-TREP QUAL: CPT

## 2025-04-23 PROCEDURE — 87491 CHLMYD TRACH DNA AMP PROBE: CPT

## 2025-04-23 PROCEDURE — 86593 SYPHILIS TEST NON-TREP QUANT: CPT

## 2025-04-23 PROCEDURE — 80061 LIPID PANEL: CPT

## 2025-04-23 PROCEDURE — 86361 T CELL ABSOLUTE COUNT: CPT

## 2025-04-23 PROCEDURE — 87591 N.GONORRHOEAE DNA AMP PROB: CPT

## 2025-04-23 PROCEDURE — 80053 COMPREHEN METABOLIC PANEL: CPT

## 2025-04-23 PROCEDURE — 86480 TB TEST CELL IMMUN MEASURE: CPT

## 2025-04-23 PROCEDURE — 83036 HEMOGLOBIN GLYCOSYLATED A1C: CPT

## 2025-04-23 PROCEDURE — 86803 HEPATITIS C AB TEST: CPT

## 2025-04-23 PROCEDURE — 36415 COLL VENOUS BLD VENIPUNCTURE: CPT

## 2025-04-23 PROCEDURE — 85025 COMPLETE CBC W/AUTO DIFF WBC: CPT

## 2025-04-23 PROCEDURE — 87536 HIV-1 QUANT&REVRSE TRNSCRPJ: CPT

## 2025-04-23 NOTE — PROGRESS NOTES
Client called CM.    Client inquired about next scheduled PCP appointment.    CM informed Client of next PCP appt scheduled for 4/25/2025 at 10 am.    Client agree to do blood work.    Moments later, Client called to report completed blood work.    CM reminded Client of need for SSA award letter to complete sliding fee scale application.    Client agree to visit SSA office and bring award letter to this CM.  No other needs discussed.

## 2025-04-24 PROBLEM — D72.810 LYMPHOPENIA: Status: RESOLVED | Noted: 2023-03-22 | Resolved: 2025-04-24

## 2025-04-24 LAB
BASOPHILS # BLD AUTO: 0.1 X10E3/UL (ref 0–0.2)
BASOPHILS NFR BLD AUTO: 1 %
C TRACH DNA SPEC QL NAA+PROBE: NEGATIVE
CD3+CD4+ CELLS # BLD: 191 /UL (ref 359–1519)
CD3+CD4+ CELLS NFR BLD: 14.7 % (ref 30.8–58.5)
EOSINOPHIL # BLD AUTO: 0.2 X10E3/UL (ref 0–0.4)
EOSINOPHIL NFR BLD AUTO: 3 %
ERYTHROCYTE [DISTWIDTH] IN BLOOD BY AUTOMATED COUNT: 13 % (ref 11.6–15.4)
GAMMA INTERFERON BACKGROUND BLD IA-ACNC: 0.06 IU/ML
HCT VFR BLD AUTO: 47.3 % (ref 37.5–51)
HCV AB SER QL: NORMAL
HGB BLD-MCNC: 15.4 G/DL (ref 13–17.7)
IMM GRANULOCYTES # BLD: 0 X10E3/UL (ref 0–0.1)
IMM GRANULOCYTES NFR BLD: 0 %
LYMPHOCYTES # BLD AUTO: 1.3 X10E3/UL (ref 0.7–3.1)
LYMPHOCYTES NFR BLD AUTO: 24 %
M TB IFN-G BLD-IMP: NEGATIVE
M TB IFN-G CD4+ BCKGRND COR BLD-ACNC: -0.01 IU/ML
M TB IFN-G CD4+ BCKGRND COR BLD-ACNC: 0.01 IU/ML
MCH RBC QN AUTO: 32.4 PG (ref 26.6–33)
MCHC RBC AUTO-ENTMCNC: 32.6 G/DL (ref 31.5–35.7)
MCV RBC AUTO: 100 FL (ref 79–97)
MITOGEN IGNF BCKGRD COR BLD-ACNC: 9.56 IU/ML
MONOCYTES # BLD AUTO: 0.7 X10E3/UL (ref 0.1–0.9)
MONOCYTES NFR BLD AUTO: 12 %
N GONORRHOEA DNA SPEC QL NAA+PROBE: NEGATIVE
NEUTROPHILS # BLD AUTO: 3.1 X10E3/UL (ref 1.4–7)
NEUTROPHILS NFR BLD AUTO: 60 %
PLATELET # BLD AUTO: 207 X10E3/UL (ref 150–450)
RBC # BLD AUTO: 4.75 X10E6/UL (ref 4.14–5.8)
RPR SER QL: REACTIVE
RPR SER-TITR: ABNORMAL {TITER}
WBC # BLD AUTO: 5.3 X10E3/UL (ref 3.4–10.8)

## 2025-04-25 LAB — HIV1 RNA # PLAS NAA DL=20: ABNORMAL {COPIES}/ML

## 2025-04-30 ENCOUNTER — PATIENT OUTREACH (OUTPATIENT)
Dept: SURGERY | Facility: CLINIC | Age: 45
End: 2025-04-30

## 2025-05-07 ENCOUNTER — PATIENT OUTREACH (OUTPATIENT)
Dept: SURGERY | Facility: CLINIC | Age: 45
End: 2025-05-07

## 2025-05-14 ENCOUNTER — PATIENT OUTREACH (OUTPATIENT)
Dept: SURGERY | Facility: CLINIC | Age: 45
End: 2025-05-14

## 2025-05-21 ENCOUNTER — DOCUMENTATION (OUTPATIENT)
Dept: SURGERY | Facility: CLINIC | Age: 45
End: 2025-05-21

## 2025-05-21 ENCOUNTER — PATIENT OUTREACH (OUTPATIENT)
Dept: SURGERY | Facility: CLINIC | Age: 45
End: 2025-05-21

## 2025-05-21 ENCOUNTER — OFFICE VISIT (OUTPATIENT)
Dept: SURGERY | Facility: CLINIC | Age: 45
End: 2025-05-21
Payer: MEDICARE

## 2025-05-21 VITALS
RESPIRATION RATE: 18 BRPM | DIASTOLIC BLOOD PRESSURE: 62 MMHG | HEIGHT: 75 IN | HEART RATE: 73 BPM | BODY MASS INDEX: 22.16 KG/M2 | TEMPERATURE: 98.1 F | WEIGHT: 178.2 LBS | SYSTOLIC BLOOD PRESSURE: 130 MMHG | OXYGEN SATURATION: 100 %

## 2025-05-21 DIAGNOSIS — F12.10 MARIJUANA ABUSE: ICD-10-CM

## 2025-05-21 DIAGNOSIS — K08.9 POOR DENTITION: ICD-10-CM

## 2025-05-21 DIAGNOSIS — R63.4 WEIGHT LOSS: ICD-10-CM

## 2025-05-21 DIAGNOSIS — T65.222D TOXIC EFFECT OF TOBACCO CIGARETTE, INTENTIONAL SELF-HARM, SUBSEQUENT ENCOUNTER: ICD-10-CM

## 2025-05-21 DIAGNOSIS — A53.9 SYPHILIS: ICD-10-CM

## 2025-05-21 DIAGNOSIS — Z23 NEED FOR ZOSTER VACCINATION: ICD-10-CM

## 2025-05-21 DIAGNOSIS — B20 CURRENTLY ASYMPTOMATIC HIV INFECTION, WITH HISTORY OF HIV-RELATED ILLNESS (HCC): Primary | ICD-10-CM

## 2025-05-21 DIAGNOSIS — Z00.00 MEDICARE ANNUAL WELLNESS VISIT, SUBSEQUENT: ICD-10-CM

## 2025-05-21 DIAGNOSIS — F41.9 ANXIETY: ICD-10-CM

## 2025-05-21 DIAGNOSIS — R73.03 PREDIABETES: ICD-10-CM

## 2025-05-21 DIAGNOSIS — F33.2 SEVERE EPISODE OF RECURRENT MAJOR DEPRESSIVE DISORDER, WITHOUT PSYCHOTIC FEATURES (HCC): ICD-10-CM

## 2025-05-21 PROBLEM — R06.2 WHEEZING WITHOUT DIAGNOSIS OF ASTHMA: Status: RESOLVED | Noted: 2021-05-20 | Resolved: 2025-05-21

## 2025-05-21 PROCEDURE — 99214 OFFICE O/P EST MOD 30 MIN: CPT | Performed by: NURSE PRACTITIONER

## 2025-05-21 PROCEDURE — G0402 INITIAL PREVENTIVE EXAM: HCPCS | Performed by: NURSE PRACTITIONER

## 2025-05-21 NOTE — ASSESSMENT & PLAN NOTE
He smokes 1 blunt and not smoke for 3 days and has notice he feels happier and doing better.  Continue to focus on smoking cessation

## 2025-05-21 NOTE — ASSESSMENT & PLAN NOTE
Pt seen by dental and did have a tooth removed.  Awaiting call for f/u visit.   Continue to follow with Dental

## 2025-05-21 NOTE — ASSESSMENT & PLAN NOTE
Recently loss 9 lb most likely due to exercising regularly and changing his health habits.   BMI: 22.27.  Continue to monitor   Continue with life style modifications

## 2025-05-21 NOTE — PROGRESS NOTES
Client called CM.    Client reports adherence to today's PCP appointment and taking HIV medication as prescribed. Viral is undetectable.     Client reports his state ID is  and asked for assistance in renewing ID.    CM encouraged Client to visit local [employer] located at [address] [place] [address], complete ID renewal application and pay fee.    Client agree to visit [employer] office and submit ID renewal application.

## 2025-05-21 NOTE — PATIENT INSTRUCTIONS
Medicare Preventive Visit Patient Instructions  Thank you for completing your Welcome to Medicare Visit or Medicare Annual Wellness Visit today. Your next wellness visit will be due in one year (5/22/2026).  The screening/preventive services that you may require over the next 5-10 years are detailed below. Some tests may not apply to you based off risk factors and/or age. Screening tests ordered at today's visit but not completed yet may show as past due. Also, please note that scanned in results may not display below.  Preventive Screenings:  Service Recommendations Previous Testing/Comments   Colorectal Cancer Screening  Colonoscopy    Fecal Occult Blood Test (FOBT)/Fecal Immunochemical Test (FIT)  Fecal DNA/Cologuard Test  Flexible Sigmoidoscopy Age: 45-75 years old   Colonoscopy: every 10 years (May be performed more frequently if at higher risk)  OR  FOBT/FIT: every 1 year  OR  Cologuard: every 3 years  OR  Sigmoidoscopy: every 5 years  Screening may be recommended earlier than age 45 if at higher risk for colorectal cancer. Also, an individualized decision between you and your healthcare provider will decide whether screening between the ages of 76-85 would be appropriate. Colonoscopy: Not on file  FOBT/FIT: Not on file  Cologuard: Not on file  Sigmoidoscopy: Not on file          Prostate Cancer Screening Individualized decision between patient and health care provider in men between ages of 55-69   Medicare will cover every 12 months beginning on the day after your 50th birthday PSA: No results in last 5 years     Screening Not Indicated     Hepatitis C Screening Once for adults born between 1945 and 1965  More frequently in patients at high risk for Hepatitis C Hep C Antibody: 04/23/2025    Screening Current   Diabetes Screening 1-2 times per year if you're at risk for diabetes or have pre-diabetes Fasting glucose: 80 mg/dL (4/23/2025)  A1C: 5.7 % (4/23/2025)  Screening Current   Cholesterol Screening Once  every 5 years if you don't have a lipid disorder. May order more often based on risk factors. Lipid panel: 04/23/2025  Screening Current      Other Preventive Screenings Covered by Medicare:  Abdominal Aortic Aneurysm (AAA) Screening: covered once if your at risk. You're considered to be at risk if you have a family history of AAA or a male between the age of 65-75 who smoking at least 100 cigarettes in your lifetime.  Lung Cancer Screening: covers low dose CT scan once per year if you meet all of the following conditions: (1) Age 55-77; (2) No signs or symptoms of lung cancer; (3) Current smoker or have quit smoking within the last 15 years; (4) You have a tobacco smoking history of at least 20 pack years (packs per day x number of years you smoked); (5) You get a written order from a healthcare provider.  Glaucoma Screening: covered annually if you're considered high risk: (1) You have diabetes OR (2) Family history of glaucoma OR (3)  aged 50 and older OR (4)  American aged 65 and older  Osteoporosis Screening: covered every 2 years if you meet one of the following conditions: (1) Have a vertebral abnormality; (2) On glucocorticoid therapy for more than 3 months; (3) Have primary hyperparathyroidism; (4) On osteoporosis medications and need to assess response to drug therapy.  HIV Screening: covered annually if you're between the age of 15-65. Also covered annually if you are younger than 15 and older than 65 with risk factors for HIV infection. For pregnant patients, it is covered up to 3 times per pregnancy.    Immunizations:  Immunization Recommendations   Influenza Vaccine Annual influenza vaccination during flu season is recommended for all persons aged >= 6 months who do not have contraindications   Pneumococcal Vaccine   * Pneumococcal conjugate vaccine = PCV13 (Prevnar 13), PCV15 (Vaxneuvance), PCV20 (Prevnar 20)  * Pneumococcal polysaccharide vaccine = PPSV23 (Pneumovax) Adults  19-63 yo with certain risk factors or if 65+ yo  If never received any pneumonia vaccine: recommend Prevnar 20 (PCV20)  Give PCV20 if previously received 1 dose of PCV13 or PPSV23   Hepatitis B Vaccine 3 dose series if at intermediate or high risk (ex: diabetes, end stage renal disease, liver disease)   Respiratory syncytial virus (RSV) Vaccine - COVERED BY MEDICARE PART D  * RSVPreF3 (Arexvy) CDC recommends that adults 60 years of age and older may receive a single dose of RSV vaccine using shared clinical decision-making (SCDM)   Tetanus (Td) Vaccine - COST NOT COVERED BY MEDICARE PART B Following completion of primary series, a booster dose should be given every 10 years to maintain immunity against tetanus. Td may also be given as tetanus wound prophylaxis.   Tdap Vaccine - COST NOT COVERED BY MEDICARE PART B Recommended at least once for all adults. For pregnant patients, recommended with each pregnancy.   Shingles Vaccine (Shingrix) - COST NOT COVERED BY MEDICARE PART B  2 shot series recommended in those 19 years and older who have or will have weakened immune systems or those 50 years and older     Health Maintenance Due:      Topic Date Due   • Hepatitis C Screening  Completed     Immunizations Due:  There are no preventive care reminders to display for this patient.  Advance Directives   What are advance directives?  Advance directives are legal documents that state your wishes and plans for medical care. These plans are made ahead of time in case you lose your ability to make decisions for yourself. Advance directives can apply to any medical decision, such as the treatments you want, and if you want to donate organs.   What are the types of advance directives?  There are many types of advance directives, and each state has rules about how to use them. You may choose a combination of any of the following:  Living will:  This is a written record of the treatment you want. You can also choose which  treatments you do not want, which to limit, and which to stop at a certain time. This includes surgery, medicine, IV fluid, and tube feedings.   Durable power of  for healthcare (DPAHC):  This is a written record that states who you want to make healthcare choices for you when you are unable to make them for yourself. This person, called a proxy, is usually a family member or a friend. You may choose more than 1 proxy.  Do not resuscitate (DNR) order:  A DNR order is used in case your heart stops beating or you stop breathing. It is a request not to have certain forms of treatment, such as CPR. A DNR order may be included in other types of advance directives.  Medical directive:  This covers the care that you want if you are in a coma, near death, or unable to make decisions for yourself. You can list the treatments you want for each condition. Treatment may include pain medicine, surgery, blood transfusions, dialysis, IV or tube feedings, and a ventilator (breathing machine).  Values history:  This document has questions about your views, beliefs, and how you feel and think about life. This information can help others choose the care that you would choose.  Why are advance directives important?  An advance directive helps you control your care. Although spoken wishes may be used, it is better to have your wishes written down. Spoken wishes can be misunderstood, or not followed. Treatments may be given even if you do not want them. An advance directive may make it easier for your family to make difficult choices about your care.   Cigarette Smoking and Your Health   Risks to your health if you smoke:  Nicotine and other chemicals found in tobacco damage every cell in your body. Even if you are a light smoker, you have an increased risk for cancer, heart disease, and lung disease. If you are pregnant or have diabetes, smoking increases your risk for complications.   Benefits to your health if you stop  smoking:   You decrease respiratory symptoms such as coughing, wheezing, and shortness of breath.   You reduce your risk for cancers of the lung, mouth, throat, kidney, bladder, pancreas, stomach, and cervix. If you already have cancer, you increase the benefits of chemotherapy. You also reduce your risk for cancer returning or a second cancer from developing.   You reduce your risk for heart disease, blood clots, heart attack, and stroke.   You reduce your risk for lung infections, and diseases such as pneumonia, asthma, chronic bronchitis, and emphysema.  Your circulation improves. More oxygen can be delivered to your body. If you have diabetes, you lower your risk for complications, such as kidney, artery, and eye diseases. You also lower your risk for nerve damage. Nerve damage can lead to amputations, poor vision, and blindness.  You improve your body's ability to heal and to fight infections.  For more information and support to stop smoking:   Smokefree.gov  Phone: 8- 563 - 325-1878  Web Address: www.[employer]   © Copyright [employer] 2018 Information is for End User's use only and may not be sold, redistributed or otherwise used for commercial purposes. All illustrations and images included in CareNotes® are the copyrighted property of [employer] Inc. or [employer]

## 2025-05-21 NOTE — ASSESSMENT & PLAN NOTE
Continues to smoke 6 to 7 cigarettes per day was about 1/2 or more before.   Stressed the importance of 100% smoking cessation  He is not using the nicotine patches   Does not qualify for LDCT due to age at this time  Nicotine Dependency - Primary    Counseled for greater than 15 minutes on the importance of smoking cessation.  Education was given regarding the cardiovascular effects of how nicotine affects the heart, lungs, kidneys, and peripheral vascular system.  Referred to [facility] for enrollment in smoking cessation program.

## 2025-05-21 NOTE — ASSESSMENT & PLAN NOTE
A1C showed improvement down from 6.0 on Jardiance 10 mg.   Continue Jardiance  Continue life style modifications

## 2025-05-21 NOTE — PROGRESS NOTES
Name: Millie Lloyd      : 1980      MRN: [id number]  Encounter Provider: ANNA Arreola  Encounter Date: 2025   Encounter department: ASC AT [facility]  :  Assessment & Plan  Currently asymptomatic HIV infection, with history of HIV-related illness (HCC)  Doing well on Descovy and Tivicay with an undetectable VL.  Pt reports 100% medication compliance on HAART.  Stressed the importance of 100% medication adherence  Monitor CD4. HIV RNA, CMP, and CBCD to monitor for any developing virologic response, treatment failure, or drug toxicities  Follow up with Dr. Parkinson or Dr. Billy    Continue Descovy and Tivicay  Reviewed labs with pt      HIV Counseling:     Viral Load: < 20     CD4 Count: 191        Denies side effects.  Stressed the importance of adherence.  Continue follow up in the ID clinic with Dr. Parkinson or Dr. Billy.     Reviewed the most recent labs, including the CD4 and viral load.  Discussed the risks and benefits of treatment options, instructions for management, importance of treatment adherence, and reduction of risk factors.  Educated on possible medication side effects.     Counseled on routes of HIV transmission, including the risk of  infection.  Emphasized that viral suppression is the best method to prevent HIV transmission.  At this time the pt denies the need for HIV testing of anyone in their life.     Total encounter time was greater than 35 minutes.  Greater than 20 minutes were spent on counseling and patient education.  Pt voices understanding and agreement with treatment plan.       Orders:    Zoster Vaccine Recombinant IM    Need for zoster vaccination    Orders:    Zoster Vaccine Recombinant IM    Syphilis  RPR remains serofast at 1:1 dils was 1:4 dils.  Continue RPR for new infection         Prediabetes  A1C showed improvement down from 6.0 on Jardiance 10 mg.   Continue Jardiance  Continue life style modifications           Toxic effect  of tobacco cigarette, intentional self-harm, subsequent encounter  Continues to smoke 6 to 7 cigarettes per day was about 1/2 or more before.   Stressed the importance of 100% smoking cessation  He is not using the nicotine patches   Does not qualify for LDCT due to age at this time  Nicotine Dependency - Primary    Counseled for greater than 15 minutes on the importance of smoking cessation.  Education was given regarding the cardiovascular effects of how nicotine affects the heart, lungs, kidneys, and peripheral vascular system.  Referred to [facility] for enrollment in smoking cessation program.            Medicare annual wellness visit, subsequent         Marijuana abuse  He smokes 1 blunt and not smoke for 3 days and has notice he feels happier and doing better.  Continue to focus on smoking cessation         Anxiety  Doing well.  Continue to monitor          Severe episode of recurrent major depressive disorder, without psychotic features (HCC)  Doing well on Zoloft 50 mg daily.  Continue Zoloft  Denies any SI or Hi's         Poor dentition  Pt seen by dental and did have a tooth removed.  Awaiting call for f/u visit.   Continue to follow with Dental          Weight loss  Recently loss 9 lb most likely due to exercising regularly and changing his health habits.   BMI: 22.27.  Continue to monitor   Continue with life style modifications           Tobacco Cessation Counseling: Tobacco cessation counseling was provided. The patient is sincerely urged to quit consumption of tobacco. He is not ready to quit tobacco. Medication options and side effects of medication discussed. Patient refused medication. Nicotine patch was prescribed.       Preventive health issues were discussed with patient, and age appropriate screening tests were ordered as noted in patient's After Visit Summary. Personalized health advice and appropriate referrals for health education or preventive services given if needed, as noted in patient's  After Visit Summary.    History of Present Illness     HPI   Millie presents for MWV and for management of HIV.  Millie reports he is doing well and feels good.  He has changed his attitude to feel more better and has made a positive difference in his life.  He has been healthy and does not have any concerns at this time.  He lost 9 lb since 1/25 and would like to be bigger but is eating healthier and has access to food.  He does boxing and plays basketball about 5 days per week. He feels his depression and anxiety have improved.     He does not endorse any fever, chills, nausea, vomiting, cough, SOB, diarrhea, or night sweats.      Review of Systems   Constitutional: Negative.    HENT: Negative.     Respiratory: Negative.     Cardiovascular: Negative.    Gastrointestinal: Negative.    Genitourinary: Negative.    Musculoskeletal: Negative.    Skin: Negative.    Neurological: Negative.    Psychiatric/Behavioral: Negative.       Medical History Reviewed by provider this encounter:       Annual Wellness Visit Questionnaire   Millie is here for his Initial Wellness visit.     Health Risk Assessment:   Patient rates overall health as good. Patient feels that their physical health rating is slightly better. Patient is very satisfied with their life. Eyesight was rated as same. Hearing was rated as slightly better. Patient feels that their emotional and mental health rating is slightly worse. Patients states they are sometimes angry. Patient states they are never, rarely unusually tired/fatigued. Pain experienced in the last 7 days has been none. Patient states that he has experienced weight loss or gain in last 6 months.     Fall Risk Screening:   In the past year, patient has experienced: no history of falling in past year      Home Safety:  Patient does not have trouble with stairs inside or outside of their home. Patient has working smoke alarms and has working carbon monoxide detector. Home safety hazards  include: none.     Nutrition:   Current diet is Regular.     Medications:   Patient is not currently taking any over-the-counter supplements. Patient is able to manage medications.     Activities of Daily Living (ADLs)/Instrumental Activities of Daily Living (IADLs):   Walk and transfer into and out of bed and chair?: Yes  Dress and groom yourself?: Yes    Bathe or shower yourself?: Yes    Feed yourself? Yes  Do your laundry/housekeeping?: Yes  Manage your money, pay your bills and track your expenses?: Yes  Make your own meals?: Yes    Do your own shopping?: Yes    Previous Hospitalizations:   Any hospitalizations or ED visits within the last 12 months?: No      Advance Care Planning:   Living will: No    Durable POA for healthcare: No      Cognitive Screening:   Provider or family/friend/caregiver concerned regarding cognition?: No    Preventive Screenings      Cardiovascular Screening:    General: Screening Current      Diabetes Screening:     General: Screening Current      Colorectal Cancer Screening:     General: Screening Not Indicated      Prostate Cancer Screening:    General: Screening Not Indicated      Osteoporosis Screening:    General: Screening Not Indicated      Abdominal Aortic Aneurysm (AAA) Screening:    Risk factors include: tobacco use        Lung Cancer Screening:     General: Screening Not Indicated      Hepatitis C Screening:    General: Screening Current    Hep C Screening Accepted: Yes      Immunizations:    - Risks/benefits immunizations discussed    - The patient declines recommended vaccines currently despite my recommendations      Cardiovascular Risk Assessment:  Patient does not have underlying ASCVD and their cardiovascular risk was assessed today. Their cardiovascular risk factors include: drug use, pre-diabetes or diabetes and CKD/proteinuria.     The 10-year ASCVD risk score (Luis JAUREGUI, et al., 2019) is: 2.9%    Values used to calculate the score:      Age: 44 years      Clincally  "relevant sex: Male      Is Non- : No      Diabetic: No      Tobacco smoker: Yes      Systolic Blood Pressure: 130 mmHg      Is BP treated: No      HDL Cholesterol: 62 mg/dL      Total Cholesterol: 175 mg/dL    Time spent assessing cardiovascular risk: 5 minutes.     Screening, Brief Intervention, and Referral to Treatment (SBIRT)     Screening  Typical number of drinks in a day: 0    Brief Intervention  Alcohol & drug use screenings were reviewed. No concerns regarding substance use disorder identified.     Other Counseling Topics:   Car/seat belt/driving safety, skin self-exam, sunscreen and calcium and vitamin D intake and regular weightbearing exercise.     Social Drivers of Health     Food Insecurity: No Food Insecurity (5/21/2025)    Hunger Vital Sign     Worried About Running Out of Food in the Last Year: Never true     Ran Out of Food in the Last Year: Never true     No results found.    Objective   /62 (BP Location: Right arm, Patient Position: Sitting, Cuff Size: Large)   Pulse 73   Temp 98.1 °F (36.7 °C) (Tympanic)   Resp 18   Ht 6' 3\" (1.905 m)   Wt 80.8 kg (178 lb 3.2 oz)   SpO2 100%   BMI 22.27 kg/m²     Physical Exam  Vitals and nursing note reviewed.   Constitutional:       General: He is not in acute distress.     Appearance: Normal appearance. He is not ill-appearing.   HENT:      Head: Normocephalic.      Right Ear: Tympanic membrane normal.      Left Ear: There is impacted cerumen.      Nose: Nose normal.      Mouth/Throat:      Mouth: Mucous membranes are moist.      Dentition: Abnormal dentition. Gingival swelling and dental caries present.      Pharynx: Oropharynx is clear. No posterior oropharyngeal erythema.     Eyes:      Extraocular Movements: Extraocular movements intact.      Pupils: Pupils are equal, round, and reactive to light.     Neck:      Thyroid: No thyroid mass, thyromegaly or thyroid tenderness.     Cardiovascular:      Rate and Rhythm: " Normal rate and regular rhythm.      Chest Wall: PMI is not displaced.      Pulses: Normal pulses.      Heart sounds: Normal heart sounds.   Pulmonary:      Effort: Pulmonary effort is normal.      Breath sounds: Normal breath sounds.   Abdominal:      General: Abdomen is flat.      Palpations: Abdomen is soft.     Musculoskeletal:         General: Normal range of motion.   Lymphadenopathy:      Cervical: No cervical adenopathy.     Skin:     General: Skin is warm and dry.      Capillary Refill: Capillary refill takes less than 2 seconds.     Neurological:      Mental Status: He is alert and oriented to person, place, and time.     Psychiatric:         Mood and Affect: Mood normal.         Behavior: Behavior normal.         Thought Content: Thought content normal.         Judgment: Judgment normal.

## 2025-05-21 NOTE — ASSESSMENT & PLAN NOTE
Doing well on Descovy and Tivicay with an undetectable VL.  Pt reports 100% medication compliance on HAART.  Stressed the importance of 100% medication adherence  Monitor CD4. HIV RNA, CMP, and CBCD to monitor for any developing virologic response, treatment failure, or drug toxicities  Follow up with Dr. Parkinson or Dr. Billy    Continue Descovy and Tivicay  Reviewed labs with pt      HIV Counseling:     Viral Load: < 20     CD4 Count: 191        Denies side effects.  Stressed the importance of adherence.  Continue follow up in the ID clinic with Dr. Parkinson or Dr. Billy.     Reviewed the most recent labs, including the CD4 and viral load.  Discussed the risks and benefits of treatment options, instructions for management, importance of treatment adherence, and reduction of risk factors.  Educated on possible medication side effects.     Counseled on routes of HIV transmission, including the risk of  infection.  Emphasized that viral suppression is the best method to prevent HIV transmission.  At this time the pt denies the need for HIV testing of anyone in their life.     Total encounter time was greater than 35 minutes.  Greater than 20 minutes were spent on counseling and patient education.  Pt voices understanding and agreement with treatment plan.       Orders:    Zoster Vaccine Recombinant IM

## 2025-05-23 NOTE — PROGRESS NOTES
[facility] Annual Sexual Health Assessment     Millie was seen by Prevention Nurse in conjunction with PCP visit on 5/21/25.     Patient is an established patient.  CD4 count:  191  Viral load:  <20  ART:   Descovy and Tivicay    Explained to patient that we complete a sexual health assessment once yearly. Millie reports he is not sexually active right now. Partner preference is female. Says he is not particularly looking for a partner but has condoms if needed. Discussed adherence to meds and importance of that to maintain his undetectable viral load.

## 2025-05-27 ENCOUNTER — PATIENT OUTREACH (OUTPATIENT)
Dept: SURGERY | Facility: CLINIC | Age: 45
End: 2025-05-27

## 2025-05-27 ENCOUNTER — TELEPHONE (OUTPATIENT)
Age: 45
End: 2025-05-27

## 2025-05-27 NOTE — TELEPHONE ENCOUNTER
Patient contacted the office to schedule a follow up visit with provider. Patient is now scheduled for 5/30/25  at 10am in office.

## 2025-05-29 ENCOUNTER — PATIENT OUTREACH (OUTPATIENT)
Dept: SURGERY | Facility: CLINIC | Age: 45
End: 2025-05-29

## 2025-06-02 ENCOUNTER — PATIENT OUTREACH (OUTPATIENT)
Dept: SURGERY | Facility: CLINIC | Age: 45
End: 2025-06-02

## 2025-06-02 ENCOUNTER — TELEPHONE (OUTPATIENT)
Dept: PSYCHIATRY | Facility: CLINIC | Age: 45
End: 2025-06-02

## 2025-06-02 NOTE — LETTER
25     Millie Lloyd   : 1980   [address] [place] [id number]       It is the policy of [facility] to monitor and manage appointments that have been no-showed or cancelled with less than 48-hour notice. This is necessary to ensure that we are able to provide timely access for all patients to our providers. Undue numbers of unutilized appointments delays necessary medical care for all patients.      Dear Millie Lloyd:      We are sorry that you missed your appointment with Mae Nelson PA-C on 2025. It has been 5 months or more since your last appointment. Your health and follow-up care are important to us. We want to make you aware that you do not have another appointment with Mae Nelson PA-C scheduled.    Please be aware that our office policy states that if you 'no show' two or more Medication Management  appointments without prior notice of cancellation within in a calendar year, you may be discharged from Medication Management  treatment.  We want to bring this to your attention now to prevent an interruption of your care.  If you have any questions about this policy, please call us at the number above.     If we do not hear from you within 10 business days to make a follow up appointment, we will assume you are no longer interested in care here.    Thank you in advance for your cooperation and assistance.       Sincerely,      [facility] Support Staff

## 2025-06-02 NOTE — PROGRESS NOTES
Client called CM.    Client reports in need of psych medication.    Client's psych treatment adherence discussed.    CM looked into Client's psych appt adherence in Epic and found Client missed last three appointments.     CM informed Client that psych med will not be filled due to lack of compliance.    CM assisted Client coordinate new psych appt for May 30, at 10 AM.    CM texted appt information to Client.    CM asked Client for copy of latest SSI award letter.    Client agree to adhere to psych appt, visit [employer] office to get award letter and bring to this CM.

## 2025-06-02 NOTE — TELEPHONE ENCOUNTER
NO-SHOW LETTER MAILED TO Millie Lloyd.  ADDRESS: [address] [place] [id number]  SENT VIA [employer]   Yes...

## 2025-06-02 NOTE — PROGRESS NOTES
CM called Client.    Client reminded of tomorrow's scheduled psych appt at 10 am.    Client agree to adherent to appointment and report outcome.

## 2025-06-10 NOTE — PROGRESS NOTES
Client called CM.    Client reports received call from [facility] but was unable to respond.    CM looked in Client's MyChart, informed Client that Psych provider made attempt to reach out to him due to missing multiple appointments.    CM encouraged Client to contact psych clinic and schedule new appointment.    Client agreed to do so.

## 2025-06-16 ENCOUNTER — PATIENT OUTREACH (OUTPATIENT)
Dept: SURGERY | Facility: CLINIC | Age: 45
End: 2025-06-16

## 2025-06-16 NOTE — PROGRESS NOTES
CM met with Client.    CM reviewed letter received with Client.    CM informed Client that his SFS application was denied due to lack of proof of income, specifically [employer] award letter.    Client provided CM with 2025 [employer] award letter.    CM assisted Client complete SFS applications.    Client also received Psych letter inform that case closure policy if he were to miss three consecutive appointments.    CM offered to assist Client with coordination of Psych appointment, but Client declined, reported not satisfied with services and prefers case to be closed for medication management. CM advised Client against this but he insisted.    No other needs reported.

## 2025-06-18 ENCOUNTER — OFFICE VISIT (OUTPATIENT)
Dept: SURGERY | Facility: CLINIC | Age: 45
End: 2025-06-18
Payer: MEDICARE

## 2025-06-18 ENCOUNTER — PATIENT OUTREACH (OUTPATIENT)
Dept: SURGERY | Facility: CLINIC | Age: 45
End: 2025-06-18

## 2025-06-18 VITALS
TEMPERATURE: 97.1 F | OXYGEN SATURATION: 97 % | SYSTOLIC BLOOD PRESSURE: 128 MMHG | BODY MASS INDEX: 21.86 KG/M2 | HEIGHT: 75 IN | DIASTOLIC BLOOD PRESSURE: 75 MMHG | WEIGHT: 175.8 LBS | HEART RATE: 85 BPM

## 2025-06-18 DIAGNOSIS — B20 CURRENTLY ASYMPTOMATIC HIV INFECTION, WITH HISTORY OF HIV-RELATED ILLNESS (HCC): Primary | ICD-10-CM

## 2025-06-18 DIAGNOSIS — T65.222D TOXIC EFFECT OF TOBACCO CIGARETTE, INTENTIONAL SELF-HARM, SUBSEQUENT ENCOUNTER: ICD-10-CM

## 2025-06-18 DIAGNOSIS — K08.9 POOR DENTITION: ICD-10-CM

## 2025-06-18 DIAGNOSIS — A53.9 SYPHILIS: ICD-10-CM

## 2025-06-18 PROCEDURE — 99214 OFFICE O/P EST MOD 30 MIN: CPT | Performed by: INTERNAL MEDICINE

## 2025-06-18 PROCEDURE — G2211 COMPLEX E/M VISIT ADD ON: HCPCS | Performed by: INTERNAL MEDICINE

## 2025-06-18 NOTE — ASSESSMENT & PLAN NOTE
He has decreased the amount he is smoking.  Continue stressed the importance of complete tobacco cessation.

## 2025-06-18 NOTE — ASSESSMENT & PLAN NOTE
Doing well on Tivicay and Descovy with an undetectable viral load and a CD4 count of 191. Continue ART, recheck CBC with differential, CMP, HIV RNA, and CD4 in 5 months to make sure no developing toxicity or treatment failure and follow-up in 6 months.  Stressed adherence.

## 2025-06-18 NOTE — PROGRESS NOTES
"Name: Millie Lloyd      : 1980      MRN: [id number]  Encounter Provider: Yordy Parkinson MD  Encounter Date: 2025   Encounter department: [facility] AT [facility]  :  Assessment & Plan  Currently asymptomatic HIV infection, with history of HIV-related illness (HCC)  Doing well on Tivicay and Descovy with an undetectable viral load and a CD4 count of 191. Continue ART, recheck CBC with differential, CMP, HIV RNA, and CD4 in 5 months to make sure no developing toxicity or treatment failure and follow-up in 6 months.  Stressed adherence.          Toxic effect of tobacco cigarette, intentional self-harm, subsequent encounter  He has decreased the amount he is smoking.  Continue stressed the importance of complete tobacco cessation.       Syphilis  Status post treatment with good response from 1: 4 down to 1: 1.  Continue to monitor RPR.       Poor dentition  Dental follow-up           Patient was provided medication, adherence and prevention education.    History of Present Illness   Routine follow-up for HIV.  Patient claims 100% adherence with Tivicay and Descovy. Patient denies any notable side effects.  Overall the feeling well.  The patient denies any fever chills or sweats, denies any nausea vomiting or diarrhea, denies any cough or shortness of breath.    ROS: A complete review of systems are negative other than that noted in the HPI        Objective   /75 (BP Location: Left arm, Patient Position: Sitting, Cuff Size: Standard)   Pulse 85   Temp (!) 97.1 °F (36.2 °C) (Tympanic)   Ht 6' 3\" (1.905 m)   Wt 79.7 kg (175 lb 12.8 oz)   SpO2 97%   BMI 21.97 kg/m²     General: Alert, interactive, appearing well, nontoxic, no acute distress.  Neck: Supple without lymphadenopathy, no thyromegaly or masses  Throat: Oropharynx moist without lesions.   Lungs: Clear to auscultation bilaterally; no wheezes, rhonchi or rales; respirations unlabored  Heart: RRR; no murmur, rub or gallop  Abdomen: " Soft, non-tender, non-distended, positive bowel sounds.    Extremities: No clubbing, cyanosis or edema  Skin: No new rashes or lesions. No draining wounds noted.    Lab Results: I have personally reviewed pertinent labs.  Lab Results   Component Value Date    K 4.1 04/23/2025     04/23/2025    CO2 33 (H) 04/23/2025    BUN 9 04/23/2025    CREATININE 0.99 04/23/2025    GLUF 80 04/23/2025    CALCIUM 10.0 04/23/2025    AST 17 04/23/2025    ALT 11 04/23/2025    ALKPHOS 83 04/23/2025    EGFR 92 04/23/2025     Lab Results   Component Value Date    WBC 5.17 04/23/2025    WBC 5.3 04/23/2025    HGB 15.8 04/23/2025    HGB 15.4 04/23/2025    HCT 47.5 04/23/2025    HCT 47.3 04/23/2025    MCV 99 (H) 04/23/2025     (H) 04/23/2025     04/23/2025     04/23/2025     Lab Results   Component Value Date    HEPCAB Non-reactive 04/23/2025     Lab Results   Component Value Date    HAV Reactive (A) 06/15/2021    HEPCAB Non-reactive 04/23/2025     Lab Results   Component Value Date    RPR Reactive (A) 04/23/2025    RPR Reactive 1 dil (A) 04/23/2025     CD4 ABS   Date/Time Value Ref Range Status   04/23/2025 09:35  (L) 359 - 1519 /uL Final     HIV-1 RNA by PCR, Qn   Date/Time Value Ref Range Status   04/13/2023 09:15 AM <20 copies/mL Final     Comment:     HIV-1 RNA not detected  The reportable range for this assay is 20 to 10,000,000  copies HIV-1 RNA/mL.     HIV-1 TARGET   Date/Time Value Ref Range Status   04/23/2025 09:35 AM Detected <20 cp/mL (A) Not Detected Final

## 2025-06-19 DIAGNOSIS — F33.2 SEVERE EPISODE OF RECURRENT MAJOR DEPRESSIVE DISORDER, WITHOUT PSYCHOTIC FEATURES (HCC): ICD-10-CM

## 2025-06-19 DIAGNOSIS — F43.10 PTSD (POST-TRAUMATIC STRESS DISORDER): ICD-10-CM

## 2025-06-19 DIAGNOSIS — F12.10 MARIJUANA ABUSE: ICD-10-CM

## 2025-06-19 DIAGNOSIS — F41.9 ANXIETY: ICD-10-CM

## 2025-06-19 NOTE — TELEPHONE ENCOUNTER
"Called and spoke to pt and informed we need to make a follow up appt for his med management provider but patient insist he don't want any more meds.    Writer asked if he wished to be discharge, pt states \"No I just don't want medication right now\".     Writer was unable to make a follow up.    "

## 2025-06-23 DIAGNOSIS — B20 HUMAN IMMUNODEFICIENCY VIRUS (HIV) DISEASE (HCC): Primary | ICD-10-CM

## 2025-07-07 RX ORDER — HYDROXYZINE HYDROCHLORIDE 25 MG/1
TABLET, FILM COATED ORAL
Qty: 60 TABLET | Refills: 1 | OUTPATIENT
Start: 2025-07-07

## 2025-07-24 ENCOUNTER — PATIENT OUTREACH (OUTPATIENT)
Dept: SURGERY | Facility: CLINIC | Age: 45
End: 2025-07-24

## 2025-07-24 NOTE — PROGRESS NOTES
[facility] Rep (will) called this CM.    [facility] Rep reports tried to reach Client several times to confirm medication delivery but no response and message could not be left.    CM agree to reach out to client's mother.    CM called Client but phone is disconnected.  CM called Client's mother but there was no response. CM left message asking for call back.

## 2025-07-28 ENCOUNTER — PATIENT OUTREACH (OUTPATIENT)
Dept: SURGERY | Facility: CLINIC | Age: 45
End: 2025-07-28

## 2025-08-04 ENCOUNTER — PATIENT OUTREACH (OUTPATIENT)
Dept: SURGERY | Facility: CLINIC | Age: 45
End: 2025-08-04

## 2025-08-11 ENCOUNTER — PATIENT OUTREACH (OUTPATIENT)
Dept: SURGERY | Facility: CLINIC | Age: 45
End: 2025-08-11

## 2025-08-12 ENCOUNTER — PATIENT OUTREACH (OUTPATIENT)
Dept: SURGERY | Facility: CLINIC | Age: 45
End: 2025-08-12